# Patient Record
Sex: FEMALE | Race: BLACK OR AFRICAN AMERICAN | NOT HISPANIC OR LATINO
[De-identification: names, ages, dates, MRNs, and addresses within clinical notes are randomized per-mention and may not be internally consistent; named-entity substitution may affect disease eponyms.]

---

## 2017-02-22 ENCOUNTER — APPOINTMENT (OUTPATIENT)
Dept: ULTRASOUND IMAGING | Facility: IMAGING CENTER | Age: 52
End: 2017-02-22

## 2017-02-22 ENCOUNTER — APPOINTMENT (OUTPATIENT)
Dept: MAMMOGRAPHY | Facility: IMAGING CENTER | Age: 52
End: 2017-02-22

## 2017-02-22 ENCOUNTER — OUTPATIENT (OUTPATIENT)
Dept: OUTPATIENT SERVICES | Facility: HOSPITAL | Age: 52
LOS: 1 days | End: 2017-02-22
Payer: COMMERCIAL

## 2017-02-22 DIAGNOSIS — Z98.89 OTHER SPECIFIED POSTPROCEDURAL STATES: Chronic | ICD-10-CM

## 2017-02-22 DIAGNOSIS — Z00.8 ENCOUNTER FOR OTHER GENERAL EXAMINATION: ICD-10-CM

## 2017-02-22 PROCEDURE — 76641 ULTRASOUND BREAST COMPLETE: CPT

## 2017-02-22 PROCEDURE — 77067 SCR MAMMO BI INCL CAD: CPT

## 2017-02-22 PROCEDURE — 77063 BREAST TOMOSYNTHESIS BI: CPT

## 2017-06-01 ENCOUNTER — APPOINTMENT (OUTPATIENT)
Dept: NEUROLOGY | Facility: CLINIC | Age: 52
End: 2017-06-01

## 2017-06-01 VITALS
SYSTOLIC BLOOD PRESSURE: 133 MMHG | DIASTOLIC BLOOD PRESSURE: 75 MMHG | HEART RATE: 85 BPM | BODY MASS INDEX: 26.84 KG/M2 | HEIGHT: 66 IN | WEIGHT: 167 LBS

## 2017-06-17 ENCOUNTER — APPOINTMENT (OUTPATIENT)
Dept: MRI IMAGING | Facility: CLINIC | Age: 52
End: 2017-06-17

## 2017-06-20 ENCOUNTER — APPOINTMENT (OUTPATIENT)
Dept: NEUROLOGY | Facility: CLINIC | Age: 52
End: 2017-06-20

## 2017-06-27 ENCOUNTER — APPOINTMENT (OUTPATIENT)
Dept: NEUROLOGY | Facility: CLINIC | Age: 52
End: 2017-06-27

## 2017-06-27 VITALS
WEIGHT: 170 LBS | HEART RATE: 72 BPM | SYSTOLIC BLOOD PRESSURE: 156 MMHG | HEIGHT: 66 IN | BODY MASS INDEX: 27.32 KG/M2 | DIASTOLIC BLOOD PRESSURE: 84 MMHG

## 2017-06-27 DIAGNOSIS — G56.11 OTHER LESIONS OF MEDIAN NERVE, RIGHT UPPER LIMB: ICD-10-CM

## 2017-06-29 ENCOUNTER — APPOINTMENT (OUTPATIENT)
Dept: NEUROLOGY | Facility: CLINIC | Age: 52
End: 2017-06-29

## 2017-07-03 ENCOUNTER — APPOINTMENT (OUTPATIENT)
Dept: MRI IMAGING | Facility: CLINIC | Age: 52
End: 2017-07-03

## 2017-07-19 ENCOUNTER — FORM ENCOUNTER (OUTPATIENT)
Age: 52
End: 2017-07-19

## 2017-07-20 ENCOUNTER — OUTPATIENT (OUTPATIENT)
Dept: OUTPATIENT SERVICES | Facility: HOSPITAL | Age: 52
LOS: 1 days | End: 2017-07-20
Payer: COMMERCIAL

## 2017-07-20 ENCOUNTER — APPOINTMENT (OUTPATIENT)
Dept: MRI IMAGING | Facility: IMAGING CENTER | Age: 52
End: 2017-07-20

## 2017-07-20 DIAGNOSIS — G44.89 OTHER HEADACHE SYNDROME: ICD-10-CM

## 2017-07-20 DIAGNOSIS — Z98.89 OTHER SPECIFIED POSTPROCEDURAL STATES: Chronic | ICD-10-CM

## 2017-07-20 PROCEDURE — 70551 MRI BRAIN STEM W/O DYE: CPT

## 2017-08-02 ENCOUNTER — APPOINTMENT (OUTPATIENT)
Dept: NEUROLOGY | Facility: CLINIC | Age: 52
End: 2017-08-02

## 2017-10-05 ENCOUNTER — APPOINTMENT (OUTPATIENT)
Dept: OBGYN | Facility: CLINIC | Age: 52
End: 2017-10-05
Payer: COMMERCIAL

## 2017-10-05 ENCOUNTER — ASOB RESULT (OUTPATIENT)
Age: 52
End: 2017-10-05

## 2017-10-05 PROCEDURE — 76857 US EXAM PELVIC LIMITED: CPT

## 2017-10-05 PROCEDURE — 76830 TRANSVAGINAL US NON-OB: CPT

## 2017-10-11 ENCOUNTER — OUTPATIENT (OUTPATIENT)
Dept: OUTPATIENT SERVICES | Facility: HOSPITAL | Age: 52
LOS: 1 days | End: 2017-10-11
Payer: COMMERCIAL

## 2017-10-11 ENCOUNTER — APPOINTMENT (OUTPATIENT)
Dept: ULTRASOUND IMAGING | Facility: IMAGING CENTER | Age: 52
End: 2017-10-11
Payer: COMMERCIAL

## 2017-10-11 DIAGNOSIS — Z98.89 OTHER SPECIFIED POSTPROCEDURAL STATES: Chronic | ICD-10-CM

## 2017-10-11 DIAGNOSIS — Z00.8 ENCOUNTER FOR OTHER GENERAL EXAMINATION: ICD-10-CM

## 2017-10-11 PROCEDURE — 76642 ULTRASOUND BREAST LIMITED: CPT

## 2017-10-12 ENCOUNTER — APPOINTMENT (OUTPATIENT)
Dept: OBGYN | Facility: CLINIC | Age: 52
End: 2017-10-12
Payer: COMMERCIAL

## 2017-10-12 VITALS
DIASTOLIC BLOOD PRESSURE: 83 MMHG | WEIGHT: 163 LBS | HEART RATE: 79 BPM | BODY MASS INDEX: 26.2 KG/M2 | SYSTOLIC BLOOD PRESSURE: 172 MMHG | HEIGHT: 66 IN

## 2017-10-12 PROCEDURE — 99213 OFFICE O/P EST LOW 20 MIN: CPT

## 2017-10-13 PROCEDURE — 76642 ULTRASOUND BREAST LIMITED: CPT | Mod: 26,50

## 2017-10-17 ENCOUNTER — APPOINTMENT (OUTPATIENT)
Dept: NEUROLOGY | Facility: CLINIC | Age: 52
End: 2017-10-17

## 2017-10-19 ENCOUNTER — OUTPATIENT (OUTPATIENT)
Dept: OUTPATIENT SERVICES | Facility: HOSPITAL | Age: 52
LOS: 1 days | End: 2017-10-19
Payer: COMMERCIAL

## 2017-10-19 ENCOUNTER — RESULT REVIEW (OUTPATIENT)
Age: 52
End: 2017-10-19

## 2017-10-19 ENCOUNTER — APPOINTMENT (OUTPATIENT)
Dept: ULTRASOUND IMAGING | Facility: IMAGING CENTER | Age: 52
End: 2017-10-19
Payer: COMMERCIAL

## 2017-10-19 DIAGNOSIS — Z98.89 OTHER SPECIFIED POSTPROCEDURAL STATES: Chronic | ICD-10-CM

## 2017-10-19 DIAGNOSIS — R92.2 INCONCLUSIVE MAMMOGRAM: ICD-10-CM

## 2017-10-19 PROCEDURE — G0206: CPT | Mod: 26,LT

## 2017-10-19 PROCEDURE — 19083 BX BREAST 1ST LESION US IMAG: CPT | Mod: LT

## 2017-10-19 PROCEDURE — 77065 DX MAMMO INCL CAD UNI: CPT

## 2017-10-19 PROCEDURE — 88305 TISSUE EXAM BY PATHOLOGIST: CPT | Mod: 26

## 2017-10-19 PROCEDURE — 88305 TISSUE EXAM BY PATHOLOGIST: CPT

## 2017-10-19 PROCEDURE — 19083 BX BREAST 1ST LESION US IMAG: CPT

## 2017-10-19 PROCEDURE — A4648: CPT

## 2017-10-20 LAB — SURGICAL PATHOLOGY STUDY: SIGNIFICANT CHANGE UP

## 2017-10-22 LAB — BACTERIA UR CULT: NORMAL

## 2017-11-02 ENCOUNTER — APPOINTMENT (OUTPATIENT)
Dept: CT IMAGING | Facility: IMAGING CENTER | Age: 52
End: 2017-11-02
Payer: COMMERCIAL

## 2017-11-02 ENCOUNTER — OUTPATIENT (OUTPATIENT)
Dept: OUTPATIENT SERVICES | Facility: HOSPITAL | Age: 52
LOS: 1 days | End: 2017-11-02
Payer: COMMERCIAL

## 2017-11-02 DIAGNOSIS — Z00.8 ENCOUNTER FOR OTHER GENERAL EXAMINATION: ICD-10-CM

## 2017-11-02 DIAGNOSIS — Z98.89 OTHER SPECIFIED POSTPROCEDURAL STATES: Chronic | ICD-10-CM

## 2017-11-02 PROCEDURE — 74177 CT ABD & PELVIS W/CONTRAST: CPT | Mod: 26

## 2017-11-02 PROCEDURE — 74177 CT ABD & PELVIS W/CONTRAST: CPT

## 2017-11-02 PROCEDURE — 82565 ASSAY OF CREATININE: CPT

## 2018-01-30 ENCOUNTER — TRANSCRIPTION ENCOUNTER (OUTPATIENT)
Age: 53
End: 2018-01-30

## 2018-08-06 ENCOUNTER — FORM ENCOUNTER (OUTPATIENT)
Age: 53
End: 2018-08-06

## 2018-08-07 ENCOUNTER — APPOINTMENT (OUTPATIENT)
Dept: MAMMOGRAPHY | Facility: IMAGING CENTER | Age: 53
End: 2018-08-07
Payer: COMMERCIAL

## 2018-08-07 ENCOUNTER — APPOINTMENT (OUTPATIENT)
Dept: ULTRASOUND IMAGING | Facility: IMAGING CENTER | Age: 53
End: 2018-08-07
Payer: COMMERCIAL

## 2018-08-07 ENCOUNTER — OUTPATIENT (OUTPATIENT)
Dept: OUTPATIENT SERVICES | Facility: HOSPITAL | Age: 53
LOS: 1 days | End: 2018-08-07
Payer: COMMERCIAL

## 2018-08-07 DIAGNOSIS — Z98.89 OTHER SPECIFIED POSTPROCEDURAL STATES: Chronic | ICD-10-CM

## 2018-08-07 DIAGNOSIS — Z00.8 ENCOUNTER FOR OTHER GENERAL EXAMINATION: ICD-10-CM

## 2018-08-07 PROCEDURE — 77063 BREAST TOMOSYNTHESIS BI: CPT | Mod: 26

## 2018-08-07 PROCEDURE — 76641 ULTRASOUND BREAST COMPLETE: CPT | Mod: 26,50

## 2018-08-07 PROCEDURE — 77063 BREAST TOMOSYNTHESIS BI: CPT

## 2018-08-07 PROCEDURE — 77067 SCR MAMMO BI INCL CAD: CPT | Mod: 26

## 2018-08-07 PROCEDURE — 76641 ULTRASOUND BREAST COMPLETE: CPT

## 2018-08-07 PROCEDURE — 77067 SCR MAMMO BI INCL CAD: CPT

## 2018-08-15 ENCOUNTER — APPOINTMENT (OUTPATIENT)
Dept: ULTRASOUND IMAGING | Facility: IMAGING CENTER | Age: 53
End: 2018-08-15
Payer: COMMERCIAL

## 2018-08-15 ENCOUNTER — OUTPATIENT (OUTPATIENT)
Dept: OUTPATIENT SERVICES | Facility: HOSPITAL | Age: 53
LOS: 1 days | End: 2018-08-15
Payer: COMMERCIAL

## 2018-08-15 ENCOUNTER — RESULT REVIEW (OUTPATIENT)
Age: 53
End: 2018-08-15

## 2018-08-15 DIAGNOSIS — R92.2 INCONCLUSIVE MAMMOGRAM: ICD-10-CM

## 2018-08-15 DIAGNOSIS — Z98.89 OTHER SPECIFIED POSTPROCEDURAL STATES: Chronic | ICD-10-CM

## 2018-08-15 PROCEDURE — A4648: CPT

## 2018-08-15 PROCEDURE — 77065 DX MAMMO INCL CAD UNI: CPT | Mod: 26,RT

## 2018-08-15 PROCEDURE — 19083 BX BREAST 1ST LESION US IMAG: CPT

## 2018-08-15 PROCEDURE — 88305 TISSUE EXAM BY PATHOLOGIST: CPT

## 2018-08-15 PROCEDURE — 77065 DX MAMMO INCL CAD UNI: CPT

## 2018-08-15 PROCEDURE — 19083 BX BREAST 1ST LESION US IMAG: CPT | Mod: RT

## 2018-08-15 PROCEDURE — 88305 TISSUE EXAM BY PATHOLOGIST: CPT | Mod: 26

## 2019-01-22 ENCOUNTER — LABORATORY RESULT (OUTPATIENT)
Age: 54
End: 2019-01-22

## 2019-01-22 ENCOUNTER — APPOINTMENT (OUTPATIENT)
Dept: PULMONOLOGY | Facility: CLINIC | Age: 54
End: 2019-01-22
Payer: COMMERCIAL

## 2019-01-22 VITALS
WEIGHT: 165 LBS | HEIGHT: 66 IN | OXYGEN SATURATION: 100 % | HEART RATE: 86 BPM | RESPIRATION RATE: 16 BRPM | BODY MASS INDEX: 26.52 KG/M2 | TEMPERATURE: 98.2 F | SYSTOLIC BLOOD PRESSURE: 167 MMHG | DIASTOLIC BLOOD PRESSURE: 91 MMHG

## 2019-01-22 PROCEDURE — 94727 GAS DIL/WSHOT DETER LNG VOL: CPT

## 2019-01-22 PROCEDURE — 88738 HGB QUANT TRANSCUTANEOUS: CPT

## 2019-01-22 PROCEDURE — 71046 X-RAY EXAM CHEST 2 VIEWS: CPT

## 2019-01-22 PROCEDURE — 99205 OFFICE O/P NEW HI 60 MIN: CPT | Mod: 25

## 2019-01-22 PROCEDURE — 94060 EVALUATION OF WHEEZING: CPT

## 2019-01-22 PROCEDURE — 36415 COLL VENOUS BLD VENIPUNCTURE: CPT

## 2019-01-22 PROCEDURE — 94750: CPT

## 2019-01-22 RX ORDER — FAMOTIDINE 40 MG/1
40 TABLET, FILM COATED ORAL
Qty: 30 | Refills: 0 | Status: DISCONTINUED | COMMUNITY
Start: 2017-09-28 | End: 2019-01-22

## 2019-01-22 RX ORDER — MOMETASONE FUROATE 1 MG/G
0.1 CREAM TOPICAL
Qty: 45 | Refills: 0 | Status: DISCONTINUED | COMMUNITY
Start: 2017-09-28 | End: 2019-01-22

## 2019-01-22 RX ORDER — AMLODIPINE BESYLATE AND BENAZEPRIL HYDROCHLORIDE 10; 40 MG/1; MG/1
10-40 CAPSULE ORAL
Qty: 30 | Refills: 0 | Status: DISCONTINUED | COMMUNITY
Start: 2017-05-09 | End: 2019-01-22

## 2019-01-22 RX ORDER — HYDROCHLOROTHIAZIDE 12.5 MG/1
12.5 CAPSULE ORAL DAILY
Refills: 0 | Status: DISCONTINUED | COMMUNITY
End: 2019-01-22

## 2019-01-23 LAB
BASOPHILS # BLD AUTO: 0.01 K/UL
BASOPHILS NFR BLD AUTO: 0.2 %
EOSINOPHIL # BLD AUTO: 0.77 K/UL
EOSINOPHIL NFR BLD AUTO: 15.7 %
HCT VFR BLD CALC: 38.4 %
HGB BLD-MCNC: 12.4 G/DL
IMM GRANULOCYTES NFR BLD AUTO: 0.2 %
LYMPHOCYTES # BLD AUTO: 1.76 K/UL
LYMPHOCYTES NFR BLD AUTO: 35.9 %
MAN DIFF?: NORMAL
MCHC RBC-ENTMCNC: 30.7 PG
MCHC RBC-ENTMCNC: 32.3 GM/DL
MCV RBC AUTO: 95 FL
MONOCYTES # BLD AUTO: 0.4 K/UL
MONOCYTES NFR BLD AUTO: 8.2 %
NEUTROPHILS # BLD AUTO: 1.95 K/UL
NEUTROPHILS NFR BLD AUTO: 39.8 %
PLATELET # BLD AUTO: 264 K/UL
RBC # BLD: 4.04 M/UL
RBC # FLD: 13.3 %
WBC # FLD AUTO: 4.9 K/UL

## 2019-01-25 LAB
A ALTERNATA IGE QN: <0.1 KUA/L
A FUMIGATUS IGE QN: 0.28 KUA/L
C ALBICANS IGE QN: 0.43 KUA/L
C HERBARUM IGE QN: 0.28 KUA/L
CAT DANDER IGE QN: 0.17 KUA/L
CLAM IGE QN: 39 KUA/L
CODFISH IGE QN: <0.1 KUA/L
COMMON RAGWEED IGE QN: 0.32 KUA/L
CORN IGE QN: 0.73 KUA/L
COW MILK IGE QN: 0.7 KUA/L
D FARINAE IGE QN: 98.6 KUA/L
D PTERONYSS IGE QN: 86 KUA/L
DEPRECATED A ALTERNATA IGE RAST QL: 0
DEPRECATED A FUMIGATUS IGE RAST QL: NORMAL
DEPRECATED C ALBICANS IGE RAST QL: ABNORMAL
DEPRECATED C HERBARUM IGE RAST QL: NORMAL
DEPRECATED CAT DANDER IGE RAST QL: NORMAL
DEPRECATED CLAM IGE RAST QL: ABNORMAL
DEPRECATED CODFISH IGE RAST QL: 0
DEPRECATED COMMON RAGWEED IGE RAST QL: NORMAL
DEPRECATED CORN IGE RAST QL: ABNORMAL
DEPRECATED COW MILK IGE RAST QL: ABNORMAL
DEPRECATED D FARINAE IGE RAST QL: ABNORMAL
DEPRECATED D PTERONYSS IGE RAST QL: ABNORMAL
DEPRECATED DOG DANDER IGE RAST QL: ABNORMAL
DEPRECATED EGG WHITE IGE RAST QL: ABNORMAL
DEPRECATED M RACEMOSUS IGE RAST QL: NORMAL
DEPRECATED PEANUT IGE RAST QL: ABNORMAL
DEPRECATED ROACH IGE RAST QL: ABNORMAL
DEPRECATED SCALLOP IGE RAST QL: 2.81 KUA/L
DEPRECATED SESAME SEED IGE RAST QL: ABNORMAL
DEPRECATED SHRIMP IGE RAST QL: ABNORMAL
DEPRECATED SOYBEAN IGE RAST QL: NORMAL
DEPRECATED TIMOTHY IGE RAST QL: NORMAL
DEPRECATED WALNUT IGE RAST QL: ABNORMAL
DEPRECATED WHEAT IGE RAST QL: ABNORMAL
DEPRECATED WHITE OAK IGE RAST QL: NORMAL
DOG DANDER IGE QN: 6.14 KUA/L
EGG WHITE IGE QN: 3.84 KUA/L
M RACEMOSUS IGE QN: 0.14 KUA/L
PEANUT IGE QN: 3.07 KUA/L
ROACH IGE QN: 13.6 KUA/L
SCALLOP IGE QN: 15.1 KUA/L
SCALLOP IGE QN: ABNORMAL
SESAME SEED IGE QN: 1.03 KUA/L
SOYBEAN IGE QN: 0.16 KUA/L
TIMOTHY IGE QN: 0.17 KUA/L
WALNUT IGE QN: 0.39 KUA/L
WHEAT IGE QN: 0.89 KUA/L
WHITE OAK IGE QN: 0.23 KUA/L

## 2019-01-30 LAB
A FLAVUS AB FLD QL: NEGATIVE
A FUMIGATUS AB FLD QL: NEGATIVE
A NIGER AB FLD QL: NEGATIVE
ASPERGILLUS FLAVUS PRECIPITINS: NEGATIVE
ASPERGILLUS FUMIGATES PRECIPTINS: NEGATIVE
ASPERGILLUS NIGER PRECIPITINS: NEGATIVE

## 2019-02-01 LAB
ASPERGILLUS FLAVUS PRECIPITINS: NEGATIVE
ASPERGILLUS FUMIGATES PRECIPTINS: NEGATIVE
ASPERGILLUS NIGER PRECIPITINS: NEGATIVE

## 2019-02-01 NOTE — DISCUSSION/SUMMARY
[FreeTextEntry1] : Shortness of breath dyspnea on exertion\par Normal pulmonary physiology and unremarkable x-ray\par Chronic sinus disease with associated sinus drip\par Taste on elevated eosinophils cannot entirely exclude inflammatory airway disease asthma\par \par Recommendation Nasacort 2 sprays each nostril daily on a consistent basis\par Add nasal Atrovent 2 sprays twice daily in each nostril\par Singulair 10 mg daily as an anti-inflammatory\par Zithromax No. 1 Per instructions\par Course of steroids with a Medrol Dosepak\par Office follow-up several weeks\par Check CBC eosinophil count post treatment\par Consider additional medication for pulmonary symptoms and even consider methacholine challenge if still short of breath\par Possible ENT pending\par f/u and discuss Allergy with Dr Boxer

## 2019-02-01 NOTE — REVIEW OF SYSTEMS
[As Noted in HPI] : as noted in HPI [Nasal Congestion] : nasal congestion [Postnasal Drip] : postnasal drip [Sinus Problems] : sinus problems [Hypertension] : ~T hypertension [Reflux] : reflux [Negative] : Pulmonary Hypertension [Dry Eyes] : no dryness of the eyes [Eye Irritation] : no ~T irritation of the eyes [Epistaxis] : no nosebleeds [Dry Mouth] : no dry mouth [Mouth Ulcers] : no mouth ulcers [Nausea] : no nausea [Vomiting] : no vomiting [Constipation] : no constipation [Diarrhea] : no diarrhea [Abdominal Pain] : no abdominal pain [Bleeding] : no bleeding [Diabetes] : no diabetes mellitus [Thyroid Problem] : no thyroid problem

## 2019-02-01 NOTE — PROCEDURE
[FreeTextEntry1] : PFT January 22, 2019\par Spirometry flow rates normal\par No brisk response to inhaled bronchodilator at FEV1\par FEV1 FEC ratio 78\par Lung volumes normal with total lung capacity 94% predicted.\par Diffusion normal 88% predicted.\par Hemoglobin 11.5\par \par Chest x-ray PA lateral projection January 22, 2019\par Lung fields are clear\par Borderline cardiomegaly\par No dominant parenchymal infiltrates dominant pulmonary nodules pleural effusions pneumothorax\par Impression clear lungs\par \par Blood draw\par Addendum data review January 23, 2018\par CBC\par White blood count 4.90 hemoglobin 12.4 hematocrit 38.4 platelet count 264,000\par Increased eosinophil count 15.7%\par \par 1/25/19 \par serum IgE elevated >3000\par RAST\par pos Dustmite\par pos Dog\par added Aspergilious  titers Addendum Negative\par Pos egg whites shell fish peanuts\par Aspergilius titers precipitins are both negative

## 2019-02-01 NOTE — REASON FOR VISIT
[Initial Evaluation] : an initial evaluation [Wheezing] : wheezing [FreeTextEntry2] : Difficult breathing

## 2019-02-01 NOTE — HISTORY OF PRESENT ILLNESS
[FreeTextEntry1] : 53-year-old female referral pulmonary evaluation\par Chief complaint difficulty breathing some component of shortness of breath chest congestion occasional wheeze\par No active sputum but has a significant cough\par No purulent sputum hemoptysis\par No fevers chills or night sweats\par No chest pain exertional chest pain pleuritic chest pain\par Active nasal sinus symptoms with congestion fullness in the sinuses and a component of drip\par Has a history of some allergies she states to foods cats dogs\par No history reported of asthma pneumonia tuberculosis pulmonary embolic disease obstructive sleep apnea\par Does recall being told of a bronchitis in the past\par Past medical history hypertension\par Allergies negative medications\par Social history former tobacco smoker smoked less than 3 curettes per day discontinued all smoking approximately 10-15 years ago\par Alcohol social\par \par Past surgical history multiple breast biopsies myomectomy\par Hysterectomy\par

## 2019-02-15 ENCOUNTER — APPOINTMENT (OUTPATIENT)
Dept: PULMONOLOGY | Facility: CLINIC | Age: 54
End: 2019-02-15
Payer: COMMERCIAL

## 2019-02-15 ENCOUNTER — LABORATORY RESULT (OUTPATIENT)
Age: 54
End: 2019-02-15

## 2019-02-15 VITALS
TEMPERATURE: 98.3 F | SYSTOLIC BLOOD PRESSURE: 158 MMHG | RESPIRATION RATE: 16 BRPM | OXYGEN SATURATION: 98 % | DIASTOLIC BLOOD PRESSURE: 101 MMHG | HEART RATE: 96 BPM

## 2019-02-15 PROCEDURE — 94060 EVALUATION OF WHEEZING: CPT

## 2019-02-15 PROCEDURE — 99214 OFFICE O/P EST MOD 30 MIN: CPT | Mod: 25

## 2019-02-15 PROCEDURE — 36415 COLL VENOUS BLD VENIPUNCTURE: CPT

## 2019-02-15 RX ORDER — AZITHROMYCIN 250 MG/1
250 TABLET, FILM COATED ORAL
Qty: 1 | Refills: 0 | Status: DISCONTINUED | COMMUNITY
Start: 2019-01-22 | End: 2019-02-15

## 2019-02-15 RX ORDER — METHYLPREDNISOLONE 4 MG/1
4 TABLET ORAL
Qty: 1 | Refills: 0 | Status: DISCONTINUED | COMMUNITY
Start: 2019-01-22 | End: 2019-02-15

## 2019-02-16 LAB — ERYTHROCYTE [SEDIMENTATION RATE] IN BLOOD BY WESTERGREN METHOD: 34 MM/HR

## 2019-02-19 LAB
BASOPHILS # BLD AUTO: 0.01 K/UL
BASOPHILS NFR BLD AUTO: 0.4 %
EOSINOPHIL # BLD AUTO: 0.18 K/UL
EOSINOPHIL NFR BLD AUTO: 6.7 %
HCT VFR BLD CALC: 38.1 %
HGB BLD-MCNC: 12.5 G/DL
IMM GRANULOCYTES NFR BLD AUTO: 0 %
LYMPHOCYTES # BLD AUTO: 1.06 K/UL
LYMPHOCYTES NFR BLD AUTO: 39.6 %
MAN DIFF?: NORMAL
MCHC RBC-ENTMCNC: 32.6 PG
MCHC RBC-ENTMCNC: 32.8 GM/DL
MCV RBC AUTO: 99.2 FL
MONOCYTES # BLD AUTO: 0.47 K/UL
MONOCYTES NFR BLD AUTO: 17.5 %
NEUTROPHILS # BLD AUTO: 0.96 K/UL
NEUTROPHILS NFR BLD AUTO: 35.8 %
PLATELET # BLD AUTO: 182 K/UL
RBC # BLD: 3.84 M/UL
RBC # FLD: 14.2 %
WBC # FLD AUTO: 2.68 K/UL

## 2019-02-19 NOTE — DISCUSSION/SUMMARY
[FreeTextEntry1] : Unexplained episodes shortness of breath\par There is no descriptive evidence with these events of a bronchospastic event\par Very elevated IgE level with significant positive food and asthma Rast testing\par Associated eosinophilia\par Consistent with Atopy\par Aerated Flovent 110 MCG 2 puffs twice daily with instructions to rinse and pro-air for rescue therapy\par Recheck CBC and sedimentation rate\par Allergy evaluation recommended with Mitchell Boxer\par Also benefit from a workup to exclude monoclonal gammopathy\par Is no evidence to suggest parasitic type of infections without any significant or high risk travel\par  \par \par

## 2019-02-19 NOTE — PHYSICAL EXAM
[General Appearance - Well Developed] : well developed [Normal Appearance] : normal appearance [Well Groomed] : well groomed [General Appearance - Well Nourished] : well nourished [No Deformities] : no deformities [General Appearance - In No Acute Distress] : no acute distress [Normal Conjunctiva] : the conjunctiva exhibited no abnormalities [Eyelids - No Xanthelasma] : the eyelids demonstrated no xanthelasmas [Neck Appearance] : the appearance of the neck was normal [Neck Cervical Mass (___cm)] : no neck mass was observed [Jugular Venous Distention Increased] : there was no jugular-venous distention [Thyroid Diffuse Enlargement] : the thyroid was not enlarged [Heart Rate And Rhythm] : heart rate and rhythm were normal [Heart Sounds] : normal S1 and S2 [Arterial Pulses Normal] : the arterial pulses were normal [Edema] : no peripheral edema present [Veins - Varicosity Changes] : no varicosital changes were noted in the lower extremities [Respiration, Rhythm And Depth] : normal respiratory rhythm and effort [Exaggerated Use Of Accessory Muscles For Inspiration] : no accessory muscle use [Auscultation Breath Sounds / Voice Sounds] : lungs were clear to auscultation bilaterally [Chest Palpation] : palpation of the chest revealed no abnormalities [Lungs Percussion] : the lungs were normal to percussion [Bowel Sounds] : normal bowel sounds [Abdomen Soft] : soft [Abdomen Tenderness] : non-tender [Abdomen Mass (___ Cm)] : no abdominal mass palpated [Abnormal Walk] : normal gait [Gait - Sufficient For Exercise Testing] : the gait was sufficient for exercise testing [Nail Clubbing] : no clubbing of the fingernails [Cyanosis, Localized] : no localized cyanosis [Petechial Hemorrhages (___cm)] : no petechial hemorrhages [Deep Tendon Reflexes (DTR)] : deep tendon reflexes were 2+ and symmetric [Sensation] : the sensory exam was normal to light touch and pinprick [Motor Exam] : the motor exam was normal [No Focal Deficits] : no focal deficits [Oriented To Time, Place, And Person] : oriented to person, place, and time [Impaired Insight] : insight and judgment were intact [Affect] : the affect was normal [Mood] : the mood was normal [Skin Color & Pigmentation] : normal skin color and pigmentation [] : no rash [No Venous Stasis] : no venous stasis [Skin Lesions] : no skin lesions [No Skin Ulcers] : no skin ulcer [No Xanthoma] : no  xanthoma was observed [FreeTextEntry1] : Faint 1/6 systolic murmur

## 2019-02-19 NOTE — PROCEDURE
[FreeTextEntry1] : PFT January 22, 2019\par Spirometry flow rates normal\par No  response to inhaled bronchodilator at FEV1\par FEV1 FEC ratio 78\par Lung volumes normal with total lung capacity 94% predicted.\par Diffusion normal 88% predicted.\par Hemoglobin 11.5\par \par Chest x-ray PA lateral projection January 22, 2019\par Lung fields are clear\par Borderline cardiomegaly\par No dominant parenchymal infiltrates dominant pulmonary nodules pleural effusions pneumothorax\par Impression clear lungs\par \par Blood draw\par \par Spirometry 2/15/2019\par  Normal Flow Rtaes\par  No decline\par Addendum data review January 23, 2018\par CBC\par White blood count 4.90 hemoglobin 12.4 hematocrit 38.4 platelet count 264,000\par Increased eosinophil count 15.7%\par \par 1/25/19 \par serum IgE elevated >3000\par RAST\par pos Dustmite\par pos Dog\par added Aspergilious  titers Addendum Negative\par Pos egg whites shell fish peanuts\par Aspergilius titers precipitins are both negative\par \par ADDENDUM\par Data review\par Sedimentation rate 34\par CBC 2.68 hemoglobin 12.5 hematocrit 38.1 platelet count 182,000\par \par 15.5% monocytes\par Eosinophils 6.7%

## 2019-03-14 ENCOUNTER — APPOINTMENT (OUTPATIENT)
Dept: ALLERGY | Facility: CLINIC | Age: 54
End: 2019-03-14
Payer: COMMERCIAL

## 2019-03-14 VITALS
SYSTOLIC BLOOD PRESSURE: 160 MMHG | BODY MASS INDEX: 26.52 KG/M2 | DIASTOLIC BLOOD PRESSURE: 100 MMHG | WEIGHT: 165 LBS | RESPIRATION RATE: 16 BRPM | HEIGHT: 66 IN | HEART RATE: 80 BPM

## 2019-03-14 PROCEDURE — 95018 ALL TSTG PERQ&IQ DRUGS/BIOL: CPT

## 2019-03-14 PROCEDURE — 31231 NASAL ENDOSCOPY DX: CPT

## 2019-03-14 PROCEDURE — 95004 PERQ TESTS W/ALRGNC XTRCS: CPT

## 2019-03-14 PROCEDURE — 99204 OFFICE O/P NEW MOD 45 MIN: CPT | Mod: 25

## 2019-03-14 RX ORDER — FLUTICASONE PROPIONATE 110 UG/1
110 AEROSOL, METERED RESPIRATORY (INHALATION)
Qty: 1 | Refills: 3 | Status: DISCONTINUED | COMMUNITY
Start: 2019-02-15 | End: 2019-03-14

## 2019-03-14 NOTE — ASSESSMENT
[FreeTextEntry1] : Perennial allergic rhinitis with associated elevated IgE and mild eosinophilia\par \par Continue Singulair QD\par Continue Nasacort AQ QD\par Sinus CT\par RV environmental intradermal skin testing \par \par Hypertension, joint aches and facial rash:\par \par Patient to take her BP pill on a regular basis\par Possible EGPA

## 2019-03-14 NOTE — SOCIAL HISTORY
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

## 2019-03-14 NOTE — REVIEW OF SYSTEMS
[Nasal Congestion] : nasal congestion [Nl] : Genitourinary [FreeTextEntry9] : body aches - left wrist swelling at times

## 2019-03-14 NOTE — PHYSICAL EXAM
[Alert] : alert [Well Nourished] : well nourished [Healthy Appearance] : healthy appearance [No Acute Distress] : no acute distress [Well Developed] : well developed [Normal Voice/Communication] : normal voice communication [Normal Lips/Tongue] : the lips and tongue were normal [Normal Tonsils] : normal tonsils [No Oral Lesions or Ulcers] : no oral lesions or ulcers [Boggy Nasal Turbinates] : boggy and/or pale nasal turbinates [No Neck Mass] : no neck mass was observed [No LAD] : no lymphadenopathy [No Thyroid Mass] : no thyroid mass [Supple] : the neck was supple [Normal Rate and Effort] : normal respiratory rhythm and effort [No Crackles] : no crackles [No Retractions] : no retractions [Normal Rate] : heart rate was normal  [Normal S1, S2] : normal S1 and S2 [Regular Rhythm] : with a regular rhythm [Normal Cervical Lymph Nodes] : cervical [No clubbing] : no clubbing [No Edema] : no edema [No Cyanosis] : no cyanosis [Normal Mood] : mood was normal [Normal Affect] : affect was normal [Judgment and Insight Age Appropriate] : judgement and insight is age appropriate [Alert, Awake, Oriented as Age-Appropriate] : alert, awake, oriented as age appropriate [Conjunctival Erythema] : no conjunctival erythema [Suborbital Bogginess] : no suborbital bogginess (allergic shiners) [Wheezing] : no wheezing was heard [de-identified] : mild papular eruption on face

## 2019-03-14 NOTE — IMPRESSION
[Allergy Testing Dust Mite] : dust mites [Allergy Testing Cockroach] : cockroach [Allergy Testing Dog] : dog [Allergy Testing Cat] : cat [] : molds [Allergy Testing Trees] : trees [Allergy Testing Weeds] : weeds [Allergy Testing Grasses] : grasses [FreeTextEntry2] : Boggy mucosa - no polyps

## 2019-03-14 NOTE — HISTORY OF PRESENT ILLNESS
[Eczematous rashes] : eczematous rashes [Venom Reactions] : venom reactions [de-identified] : Patient born in [facility] and moved to [place] age 8 and moved to [place] to 18.   Patient with a long history of itchy throat and ears, sneezing perennial basis.   She was treated with Nasacort AQ with improvement of her symptoms.   She also reports a long history of body aches, upper back pain - she has seen doctors with no diagnosis.   Her nose has been more blocked and congested - associated with SOB with sense of gasping for air at times.   She was evaluated by Dr. Morales and treated Medrol and Z pack in January with some improvement.   Her nasal congestion, coughing attacks and SOB improved.   She improved x 4-5 weeks and nasal congestion worsened but chest did not become as problematic.   \par \par Mucus from her nose ranges from yellow - dark green - but now mucus is clear.    Sense of taste and smell normal.   She has body aches which are worse in the AM or if she sits and gets up her legs will hurt.    \par \par Lab work up revealed an eosinophil count of 770 K/uL and repeated after Medrol pack was 180 K/uL.   IgE 3316 KU/L \par \par No history of asthma in the past.   \par \par Egg allergy as a child - vomiting with rash.    About two years ago she was able to eat egg again.    She was advised to avoid again based upon blood testing.   Oral itch in the past with shell fish but she has started to eat again. \par \par Patient also with facial rash which is intermittently itchy - rash comes and goes \par \par No recent travel to [facility].

## 2019-03-19 ENCOUNTER — APPOINTMENT (OUTPATIENT)
Dept: ALLERGY | Facility: CLINIC | Age: 54
End: 2019-03-19
Payer: COMMERCIAL

## 2019-03-19 VITALS
WEIGHT: 165 LBS | DIASTOLIC BLOOD PRESSURE: 90 MMHG | BODY MASS INDEX: 26.52 KG/M2 | RESPIRATION RATE: 14 BRPM | HEIGHT: 66 IN | SYSTOLIC BLOOD PRESSURE: 160 MMHG | HEART RATE: 72 BPM

## 2019-03-19 PROCEDURE — 95018 ALL TSTG PERQ&IQ DRUGS/BIOL: CPT

## 2019-03-19 PROCEDURE — 99213 OFFICE O/P EST LOW 20 MIN: CPT | Mod: 25

## 2019-03-19 PROCEDURE — 95024 IQ TESTS W/ALLERGENIC XTRCS: CPT

## 2019-03-19 RX ORDER — IPRATROPIUM BROMIDE 21 UG/1
0.03 SPRAY NASAL
Qty: 1 | Refills: 3 | Status: DISCONTINUED | COMMUNITY
Start: 2019-01-22 | End: 2019-03-19

## 2019-03-19 NOTE — ASSESSMENT
[FreeTextEntry1] : Perennial allergic rhinitis - possible EGPA\par \par Continue Singulair - Nasacort AQ\par Add Xyzal 5 mg QHS \par Sinus CT \par \par Hypertension:\par \par Encourage regular use of BP medications.

## 2019-03-19 NOTE — HISTORY OF PRESENT ILLNESS
[Asthma] : asthma [Eczematous rashes] : eczematous rashes [Venom Reactions] : venom reactions [Food Allergies] : food allergies [de-identified] : Patient is having her sinus CT on Thursday.    Patient has missed some of her BP medication dosages.    She is taking Singulair and Flonase every AM mild improvement.

## 2019-03-19 NOTE — PHYSICAL EXAM
[Well Nourished] : well nourished [Alert] : alert [Healthy Appearance] : healthy appearance [No Acute Distress] : no acute distress [Well Developed] : well developed [Normal Voice/Communication] : normal voice communication [Normal Lips/Tongue] : the lips and tongue were normal [Normal Tonsils] : normal tonsils [No Oral Lesions or Ulcers] : no oral lesions or ulcers [Boggy Nasal Turbinates] : boggy and/or pale nasal turbinates [No Neck Mass] : no neck mass was observed [No LAD] : no lymphadenopathy [No Thyroid Mass] : no thyroid mass [Normal Rate and Effort] : normal respiratory rhythm and effort [Supple] : the neck was supple [No Crackles] : no crackles [No Retractions] : no retractions [Normal Rate] : heart rate was normal  [Normal S1, S2] : normal S1 and S2 [Normal Cervical Lymph Nodes] : cervical [Regular Rhythm] : with a regular rhythm [No clubbing] : no clubbing [No Edema] : no edema [No Cyanosis] : no cyanosis [Normal Mood] : mood was normal [Normal Affect] : affect was normal [Judgment and Insight Age Appropriate] : judgement and insight is age appropriate [Alert, Awake, Oriented as Age-Appropriate] : alert, awake, oriented as age appropriate [Conjunctival Erythema] : no conjunctival erythema [Suborbital Bogginess] : no suborbital bogginess (allergic shiners) [Wheezing] : no wheezing was heard [de-identified] : mild papular eruption on face

## 2019-03-19 NOTE — SOCIAL HISTORY
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

## 2019-03-19 NOTE — REVIEW OF SYSTEMS
[Nasal Congestion] : nasal congestion [Nl] : Endocrine [FreeTextEntry9] : body aches - left wrist swelling at times

## 2019-03-25 ENCOUNTER — TRANSCRIPTION ENCOUNTER (OUTPATIENT)
Age: 54
End: 2019-03-25

## 2019-03-27 ENCOUNTER — OUTPATIENT (OUTPATIENT)
Dept: OUTPATIENT SERVICES | Facility: HOSPITAL | Age: 54
LOS: 1 days | End: 2019-03-27
Payer: COMMERCIAL

## 2019-03-27 ENCOUNTER — APPOINTMENT (OUTPATIENT)
Dept: CT IMAGING | Facility: CLINIC | Age: 54
End: 2019-03-27
Payer: COMMERCIAL

## 2019-03-27 DIAGNOSIS — Z98.89 OTHER SPECIFIED POSTPROCEDURAL STATES: Chronic | ICD-10-CM

## 2019-03-27 DIAGNOSIS — J32.9 CHRONIC SINUSITIS, UNSPECIFIED: ICD-10-CM

## 2019-03-27 PROCEDURE — 70486 CT MAXILLOFACIAL W/O DYE: CPT

## 2019-03-27 PROCEDURE — 70486 CT MAXILLOFACIAL W/O DYE: CPT | Mod: 26

## 2019-04-04 ENCOUNTER — TRANSCRIPTION ENCOUNTER (OUTPATIENT)
Age: 54
End: 2019-04-04

## 2019-04-11 ENCOUNTER — APPOINTMENT (OUTPATIENT)
Dept: ALLERGY | Facility: CLINIC | Age: 54
End: 2019-04-11
Payer: COMMERCIAL

## 2019-04-11 VITALS
HEART RATE: 90 BPM | SYSTOLIC BLOOD PRESSURE: 150 MMHG | WEIGHT: 165 LBS | HEIGHT: 66 IN | RESPIRATION RATE: 14 BRPM | BODY MASS INDEX: 26.52 KG/M2 | DIASTOLIC BLOOD PRESSURE: 90 MMHG

## 2019-04-11 PROCEDURE — 99214 OFFICE O/P EST MOD 30 MIN: CPT

## 2019-05-14 NOTE — PHYSICAL EXAM
[Well Nourished] : well nourished [Alert] : alert [No Acute Distress] : no acute distress [Healthy Appearance] : healthy appearance [Well Developed] : well developed [Normal Voice/Communication] : normal voice communication [Normal Lips/Tongue] : the lips and tongue were normal [No Oral Lesions or Ulcers] : no oral lesions or ulcers [Normal Tonsils] : normal tonsils [No Neck Mass] : no neck mass was observed [Boggy Nasal Turbinates] : boggy and/or pale nasal turbinates [No Thyroid Mass] : no thyroid mass [No LAD] : no lymphadenopathy [Supple] : the neck was supple [Normal Rate and Effort] : normal respiratory rhythm and effort [No Retractions] : no retractions [No Crackles] : no crackles [Normal Rate] : heart rate was normal  [Regular Rhythm] : with a regular rhythm [Normal S1, S2] : normal S1 and S2 [Normal Cervical Lymph Nodes] : cervical [No clubbing] : no clubbing [No Edema] : no edema [No Cyanosis] : no cyanosis [Normal Mood] : mood was normal [Normal Affect] : affect was normal [Judgment and Insight Age Appropriate] : judgement and insight is age appropriate [Alert, Awake, Oriented as Age-Appropriate] : alert, awake, oriented as age appropriate [Conjunctival Erythema] : no conjunctival erythema [Suborbital Bogginess] : no suborbital bogginess (allergic shiners) [Wheezing] : no wheezing was heard [de-identified] : mild papular eruption on face

## 2019-05-14 NOTE — ASSESSMENT
[FreeTextEntry1] : Perennial allergic rhinitis with seasonal exacerbation:\par \par Continue Singulair QD\par Continue Nasacort AQ QD\par Continue Xyzal \par 1. I have reviewed the treatment option of allergy immunotherapy.\par 2. I have reviewed allergen avoidance measures in order to reduce exposure to pertinent allergens.\par 3. IT QA booklet given to patient detailing allergy immunotherapy\par 4. I have recommended medical therapy for patient's allergy symptoms as outlined below. \par \par Joint pain/facial rash:\par \par Patient to see rheumatology for evaluation prior to starting allergy immunotherapy.   She will contact me after the evaluation.\par \par Hypertension:\par \par Mrs. Garcia to see PCP for medical management.

## 2019-05-14 NOTE — CONSULT LETTER
[Dear  ___] : Dear  [unfilled], [Thank you for referring [unfilled] for consultation for _____] : Thank you for referring [unfilled] for consultation for [unfilled] [Sincerely,] : Sincerely, [Please see my note below.] : Please see my note below. [FreeTextEntry3] : Mitchell B. Boxer, M.D., FAAAAI\par [facility]\par \par Department of Allergy-Immunology\par [facility] [place] [facility] \par [address] [place] [address]\par [place] [address]\par Tel:   [phone]\par Fax:  [phone]\par Email: [email or web address]

## 2019-05-14 NOTE — SOCIAL HISTORY
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

## 2019-05-14 NOTE — HISTORY OF PRESENT ILLNESS
[Asthma] : asthma [Eczematous rashes] : eczematous rashes [Venom Reactions] : venom reactions [Food Allergies] : food allergies [de-identified] : Patient doing much better with Singulair, Nasacort AQ and Xyzal - mucosal thickening on CT sinuses.   Patient continues to have sporadic joint pain and mild facial rash.

## 2019-07-03 ENCOUNTER — LABORATORY RESULT (OUTPATIENT)
Age: 54
End: 2019-07-03

## 2019-07-03 ENCOUNTER — APPOINTMENT (OUTPATIENT)
Dept: RHEUMATOLOGY | Facility: CLINIC | Age: 54
End: 2019-07-03
Payer: COMMERCIAL

## 2019-07-03 VITALS
HEIGHT: 66 IN | BODY MASS INDEX: 25.71 KG/M2 | HEART RATE: 69 BPM | WEIGHT: 160 LBS | SYSTOLIC BLOOD PRESSURE: 149 MMHG | DIASTOLIC BLOOD PRESSURE: 84 MMHG | OXYGEN SATURATION: 98 %

## 2019-07-03 PROCEDURE — 99204 OFFICE O/P NEW MOD 45 MIN: CPT

## 2019-07-05 LAB
ALBUMIN SERPL ELPH-MCNC: 4.5 G/DL
ALP BLD-CCNC: 69 U/L
ALT SERPL-CCNC: 18 U/L
ANION GAP SERPL CALC-SCNC: 12 MMOL/L
APPEARANCE: CLEAR
APTT BLD: 39.1 SEC
AST SERPL-CCNC: 19 U/L
BACTERIA: NEGATIVE
BASOPHILS # BLD AUTO: 0.02 K/UL
BASOPHILS NFR BLD AUTO: 0.5 %
BILIRUB SERPL-MCNC: 0.3 MG/DL
BILIRUBIN URINE: NEGATIVE
BLOOD URINE: NEGATIVE
BUN SERPL-MCNC: 16 MG/DL
CALCIUM SERPL-MCNC: 9.5 MG/DL
CENTROMERE IGG SER-ACNC: 0.2 CD:130001892
CHLORIDE SERPL-SCNC: 104 MMOL/L
CO2 SERPL-SCNC: 25 MMOL/L
COLOR: NORMAL
CREAT SERPL-MCNC: 1.08 MG/DL
CREAT SPEC-SCNC: 120 MG/DL
CREAT/PROT UR: 0.1 RATIO
CRP SERPL-MCNC: <0.1 MG/DL
ENA RNP AB SER IA-ACNC: 2.5 AL
ENA SCL70 IGG SER IA-ACNC: <0.2 AL
ENA SM AB SER IA-ACNC: <0.2 AL
ENA SS-A AB SER IA-ACNC: <0.2 AL
ENA SS-B AB SER IA-ACNC: <0.2 AL
EOSINOPHIL # BLD AUTO: 0.5 K/UL
EOSINOPHIL NFR BLD AUTO: 12 %
ERYTHROCYTE [SEDIMENTATION RATE] IN BLOOD BY WESTERGREN METHOD: 24 MM/HR
FOLATE SERPL-MCNC: 12.3 NG/ML
GLUCOSE QUALITATIVE U: NEGATIVE
GLUCOSE SERPL-MCNC: 90 MG/DL
HCT VFR BLD CALC: 36.6 %
HGB BLD-MCNC: 11.7 G/DL
HYALINE CASTS: 0 /LPF
IMM GRANULOCYTES NFR BLD AUTO: 0.2 %
KETONES URINE: NEGATIVE
LEUKOCYTE ESTERASE URINE: NEGATIVE
LYMPHOCYTES # BLD AUTO: 1.67 K/UL
LYMPHOCYTES NFR BLD AUTO: 40 %
MAN DIFF?: NORMAL
MCHC RBC-ENTMCNC: 31.6 PG
MCHC RBC-ENTMCNC: 32 GM/DL
MCV RBC AUTO: 98.9 FL
MICROSCOPIC-UA: NORMAL
MONOCYTES # BLD AUTO: 0.34 K/UL
MONOCYTES NFR BLD AUTO: 8.1 %
NEUTROPHILS # BLD AUTO: 1.64 K/UL
NEUTROPHILS NFR BLD AUTO: 39.2 %
NITRITE URINE: NEGATIVE
PH URINE: 7
PLATELET # BLD AUTO: 227 K/UL
POTASSIUM SERPL-SCNC: 4.4 MMOL/L
PROT SERPL-MCNC: 7.2 G/DL
PROT UR-MCNC: 5 MG/DL
PROTEIN URINE: NEGATIVE
RBC # BLD: 3.7 M/UL
RBC # FLD: 13.6 %
RED BLOOD CELLS URINE: 1 /HPF
SODIUM SERPL-SCNC: 141 MMOL/L
SPECIFIC GRAVITY URINE: 1.02
SQUAMOUS EPITHELIAL CELLS: 1 /HPF
THYROGLOB AB SERPL-ACNC: <20 IU/ML
THYROPEROXIDASE AB SERPL IA-ACNC: 17 IU/ML
TSH SERPL-ACNC: 0.86 UIU/ML
UROBILINOGEN URINE: NORMAL
VIT B12 SERPL-MCNC: 981 PG/ML
WBC # FLD AUTO: 4.18 K/UL
WHITE BLOOD CELLS URINE: 0 /HPF

## 2019-07-22 ENCOUNTER — APPOINTMENT (OUTPATIENT)
Dept: ALLERGY | Facility: CLINIC | Age: 54
End: 2019-07-22
Payer: COMMERCIAL

## 2019-07-22 VITALS
OXYGEN SATURATION: 98 % | HEART RATE: 79 BPM | RESPIRATION RATE: 14 BRPM | BODY MASS INDEX: 25.71 KG/M2 | SYSTOLIC BLOOD PRESSURE: 136 MMHG | HEIGHT: 66 IN | DIASTOLIC BLOOD PRESSURE: 80 MMHG | WEIGHT: 160 LBS

## 2019-07-22 PROCEDURE — 99214 OFFICE O/P EST MOD 30 MIN: CPT

## 2019-07-22 NOTE — PHYSICAL EXAM
[Alert] : alert [Well Nourished] : well nourished [Healthy Appearance] : healthy appearance [No Acute Distress] : no acute distress [Well Developed] : well developed [Normal Voice/Communication] : normal voice communication [Conjunctival Erythema] : no conjunctival erythema [Suborbital Bogginess] : no suborbital bogginess (allergic shiners) [Normal Lips/Tongue] : the lips and tongue were normal [Normal Tonsils] : normal tonsils [No Oral Lesions or Ulcers] : no oral lesions or ulcers [Boggy Nasal Turbinates] : boggy and/or pale nasal turbinates [No Neck Mass] : no neck mass was observed [No LAD] : no lymphadenopathy [Supple] : the neck was supple [No Thyroid Mass] : no thyroid mass [Normal Rate and Effort] : normal respiratory rhythm and effort [No Crackles] : no crackles [No Retractions] : no retractions [Wheezing] : no wheezing was heard [Normal S1, S2] : normal S1 and S2 [Normal Rate] : heart rate was normal  [Regular Rhythm] : with a regular rhythm [Normal Cervical Lymph Nodes] : cervical [Skin Intact] : skin intact  [No Rash] : no rash [No clubbing] : no clubbing [No Edema] : no edema [No Cyanosis] : no cyanosis [Normal Mood] : mood was normal [Normal Affect] : affect was normal [Judgment and Insight Age Appropriate] : judgement and insight is age appropriate [Alert, Awake, Oriented as Age-Appropriate] : alert, awake, oriented as age appropriate

## 2019-07-22 NOTE — HISTORY OF PRESENT ILLNESS
[de-identified] : Leaving for [place] in 2 days - nasal congestion, itchy nose and throat and ears - SOB - coughing but no wheezing - taking Singulair - Xyzal - irregular use of Nasacort.

## 2019-07-22 NOTE — ASSESSMENT
[FreeTextEntry1] : Perennial and seasonal allergic rhinoconjunctivitis:\par \par Patient will be flying to [place] for son's wedding therefore will treated with Medrol pack\par Continue Xyzal 5 mg QD\par Continue Singulair QD\par Continue Nasacort AQ but use on a regular basis\par Ms. Garcia has consulted with rheumatology and she desires to start allergy immunotherapy - she will discuss the results of her lab work up with her physician to make sure her joint pain is not secondary to an underlying autoimmune disease.

## 2019-07-22 NOTE — SOCIAL HISTORY
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

## 2019-07-26 LAB
25(OH)D3 SERPL-MCNC: 24.5 NG/ML
ANA SER IF-ACNC: NEGATIVE
B2 GLYCOPROT1 AB SER QL: NEGATIVE
CARDIOLIPIN AB SER IA-ACNC: NEGATIVE
DSDNA AB SER-ACNC: <12 IU/ML
RNA POLYMERASE III IGG: <10 U

## 2019-08-09 ENCOUNTER — APPOINTMENT (OUTPATIENT)
Dept: PULMONOLOGY | Facility: CLINIC | Age: 54
End: 2019-08-09
Payer: COMMERCIAL

## 2019-08-09 VITALS
OXYGEN SATURATION: 100 % | HEIGHT: 66 IN | RESPIRATION RATE: 16 BRPM | WEIGHT: 160 LBS | SYSTOLIC BLOOD PRESSURE: 149 MMHG | BODY MASS INDEX: 25.71 KG/M2 | HEART RATE: 82 BPM | DIASTOLIC BLOOD PRESSURE: 90 MMHG

## 2019-08-09 PROCEDURE — 94060 EVALUATION OF WHEEZING: CPT

## 2019-08-09 PROCEDURE — 94664 DEMO&/EVAL PT USE INHALER: CPT | Mod: 59

## 2019-08-09 PROCEDURE — 99214 OFFICE O/P EST MOD 30 MIN: CPT | Mod: 25

## 2019-08-09 PROCEDURE — 94726 PLETHYSMOGRAPHY LUNG VOLUMES: CPT

## 2019-08-09 PROCEDURE — ZZZZZ: CPT

## 2019-08-09 PROCEDURE — 94729 DIFFUSING CAPACITY: CPT

## 2019-08-09 PROCEDURE — 94750: CPT

## 2019-08-09 RX ORDER — METHYLPREDNISOLONE 4 MG/1
4 TABLET ORAL
Qty: 1 | Refills: 0 | Status: DISCONTINUED | COMMUNITY
Start: 2019-07-22 | End: 2019-08-09

## 2019-08-09 RX ORDER — AMLODIPINE BESYLATE 5 MG/1
5 TABLET ORAL
Qty: 90 | Refills: 0 | Status: DISCONTINUED | COMMUNITY
Start: 2017-09-28 | End: 2019-08-09

## 2019-08-09 NOTE — PHYSICAL EXAM
[General Appearance - Well Developed] : well developed [Well Groomed] : well groomed [Normal Appearance] : normal appearance [No Deformities] : no deformities [General Appearance - Well Nourished] : well nourished [General Appearance - In No Acute Distress] : no acute distress [Normal Conjunctiva] : the conjunctiva exhibited no abnormalities [Eyelids - No Xanthelasma] : the eyelids demonstrated no xanthelasmas [Neck Appearance] : the appearance of the neck was normal [Neck Cervical Mass (___cm)] : no neck mass was observed [Jugular Venous Distention Increased] : there was no jugular-venous distention [Thyroid Diffuse Enlargement] : the thyroid was not enlarged [Heart Rate And Rhythm] : heart rate and rhythm were normal [Heart Sounds] : normal S1 and S2 [Arterial Pulses Normal] : the arterial pulses were normal [Edema] : no peripheral edema present [Veins - Varicosity Changes] : no varicosital changes were noted in the lower extremities [Respiration, Rhythm And Depth] : normal respiratory rhythm and effort [Chest Palpation] : palpation of the chest revealed no abnormalities [Exaggerated Use Of Accessory Muscles For Inspiration] : no accessory muscle use [Auscultation Breath Sounds / Voice Sounds] : lungs were clear to auscultation bilaterally [Lungs Percussion] : the lungs were normal to percussion [Bowel Sounds] : normal bowel sounds [Abdomen Soft] : soft [Abdomen Tenderness] : non-tender [Abdomen Mass (___ Cm)] : no abdominal mass palpated [Abnormal Walk] : normal gait [Gait - Sufficient For Exercise Testing] : the gait was sufficient for exercise testing [Nail Clubbing] : no clubbing of the fingernails [Cyanosis, Localized] : no localized cyanosis [Petechial Hemorrhages (___cm)] : no petechial hemorrhages [Skin Color & Pigmentation] : normal skin color and pigmentation [No Venous Stasis] : no venous stasis [] : no rash [No Skin Ulcers] : no skin ulcer [Skin Lesions] : no skin lesions [Deep Tendon Reflexes (DTR)] : deep tendon reflexes were 2+ and symmetric [No Xanthoma] : no  xanthoma was observed [Sensation] : the sensory exam was normal to light touch and pinprick [No Focal Deficits] : no focal deficits [Motor Exam] : the motor exam was normal [Oriented To Time, Place, And Person] : oriented to person, place, and time [Impaired Insight] : insight and judgment were intact [Affect] : the affect was normal [Mood] : the mood was normal [FreeTextEntry1] :  No Ausculatory wheeze or crackle

## 2019-08-09 NOTE — CONSULT LETTER
[Dear  ___] : Dear  [unfilled], [Courtesy Letter:] : I had the pleasure of seeing your patient, [unfilled], in my office today. [Consult Closing:] : Thank you very much for allowing me to participate in the care of this patient.  If you have any questions, please do not hesitate to contact me. [Please see my note below.] : Please see my note below. [Sincerely,] : Sincerely, [FreeTextEntry3] : Raghav Morales D.O., SUSANA\par  of Medicine\par [facility] [employer]\par

## 2019-08-09 NOTE — REVIEW OF SYSTEMS
[As Noted in HPI] : as noted in HPI [Nasal Congestion] : nasal congestion [Postnasal Drip] : postnasal drip [Sinus Problems] : sinus problems [Hypertension] : ~T hypertension [Reflux] : reflux [Negative] : Psychiatric [Dry Eyes] : no dryness of the eyes [Epistaxis] : no nosebleeds [Dry Mouth] : no dry mouth [Eye Irritation] : no ~T irritation of the eyes [Nausea] : no nausea [Mouth Ulcers] : no mouth ulcers [Vomiting] : no vomiting [Constipation] : no constipation [Diarrhea] : no diarrhea [Abdominal Pain] : no abdominal pain [Bleeding] : no bleeding [Diabetes] : no diabetes mellitus [Thyroid Problem] : no thyroid problem

## 2019-08-09 NOTE — HISTORY OF PRESENT ILLNESS
[FreeTextEntry1] : Allergy immunology evaluation testing treatment protocol noted\par Patient underwent a rheumatologic evaluation with a positive RNP and requested a repeat complete pulmonary function test\par Otherwise patient has no active respiratory symptoms.\par Denies any active wheezing cough with purulent sputum.  Denies lower extremity edema.\par

## 2019-08-09 NOTE — PROCEDURE
[FreeTextEntry1] : PFT August 9, 2019\par Spirometry normal\par No response to bronchodilator at FEV1\par Lung volumes normal\par Total lung capacity 99% predicted.\par Resistance and specific conductance normal\par Diffusion normal 87% predicted.\par Heme globin 11.7\par Data compared to January 22, 2019 demonstrates improvement of flow rates stable diffusion and interval improvement at the total lung capacity\par \par Chest x-ray PA lateral projection January 22, 2019\par Lung fields are clear\par Borderline cardiomegaly\par No dominant parenchymal infiltrates dominant pulmonary nodules pleural effusions pneumothorax\par Impression clear lungs\par \par Addendum data review January 23, 2018\par CBC\par White blood count 4.90 hemoglobin 12.4 hematocrit 38.4 platelet count 264,000\par Increased eosinophil count 15.7%\par \par 1/25/19 \par serum IgE elevated >3000\par RAST\par pos Dustmite\par pos Dog\par added Aspergilious  titers Addendum Negative\par Pos egg whites shell fish peanuts\par Aspergilius titers precipitins are both negative\par \par ADDENDUM\par Data review\par Sedimentation rate 34\par CBC 2.68 hemoglobin 12.5 hematocrit 38.1 platelet count 182,000\par \par 15.5% monocytes\par Eosinophils 6.7%

## 2019-08-09 NOTE — DISCUSSION/SUMMARY
[FreeTextEntry1] : Positive anti-RNP as per rheumatology\par Repeat pulmonary function testing with body box normal study with no decline in flow rates\par Clinical exam does not demonstrate findings consistent with interstitial lung disease without crackles at the bases\par Very elevated IgE level improved post steroids with significant positive food and asthma Rast testing\par Associated eosinophilia\par Consistent with Atopy\par Allergy f/u  with Mitchell Boxer\par Did recommend she have cardiology follow-up in a primary care physician she states his cardiologist for further evaluation as well\par \par  \par \par

## 2019-09-23 ENCOUNTER — APPOINTMENT (OUTPATIENT)
Dept: ALLERGY | Facility: CLINIC | Age: 54
End: 2019-09-23
Payer: COMMERCIAL

## 2019-09-23 PROCEDURE — 95004 PERQ TESTS W/ALRGNC XTRCS: CPT

## 2019-09-23 PROCEDURE — 95018 ALL TSTG PERQ&IQ DRUGS/BIOL: CPT

## 2019-10-10 ENCOUNTER — APPOINTMENT (OUTPATIENT)
Dept: RHEUMATOLOGY | Facility: CLINIC | Age: 54
End: 2019-10-10
Payer: COMMERCIAL

## 2019-10-10 VITALS
BODY MASS INDEX: 27.44 KG/M2 | OXYGEN SATURATION: 98 % | TEMPERATURE: 98.4 F | SYSTOLIC BLOOD PRESSURE: 148 MMHG | HEART RATE: 85 BPM | DIASTOLIC BLOOD PRESSURE: 86 MMHG | WEIGHT: 170 LBS

## 2019-10-10 DIAGNOSIS — R76.8 OTHER SPECIFIED ABNORMAL IMMUNOLOGICAL FINDINGS IN SERUM: ICD-10-CM

## 2019-10-10 PROCEDURE — 99214 OFFICE O/P EST MOD 30 MIN: CPT

## 2019-10-14 ENCOUNTER — APPOINTMENT (OUTPATIENT)
Dept: ALLERGY | Facility: CLINIC | Age: 54
End: 2019-10-14
Payer: COMMERCIAL

## 2019-10-14 ENCOUNTER — TRANSCRIPTION ENCOUNTER (OUTPATIENT)
Age: 54
End: 2019-10-14

## 2019-10-14 LAB
ALBUMIN SERPL ELPH-MCNC: 4.5 G/DL
ALP BLD-CCNC: 68 U/L
ALT SERPL-CCNC: 10 U/L
ANION GAP SERPL CALC-SCNC: 13 MMOL/L
APPEARANCE: CLEAR
APTT BLD: 35.7 SEC
AST SERPL-CCNC: 13 U/L
B2 GLYCOPROT1 AB SER QL: NEGATIVE
BACTERIA: NEGATIVE
BASOPHILS # BLD AUTO: 0.02 K/UL
BASOPHILS NFR BLD AUTO: 0.4 %
BILIRUB SERPL-MCNC: 0.3 MG/DL
BILIRUBIN URINE: NEGATIVE
BLOOD URINE: NEGATIVE
BUN SERPL-MCNC: 16 MG/DL
CALCIUM SERPL-MCNC: 9.9 MG/DL
CARDIOLIPIN AB SER IA-ACNC: NEGATIVE
CENTROMERE IGG SER-ACNC: 0.2 CD:130001892
CHLORIDE SERPL-SCNC: 101 MMOL/L
CO2 SERPL-SCNC: 23 MMOL/L
COLOR: YELLOW
CREAT SERPL-MCNC: 1 MG/DL
CREAT SPEC-SCNC: 224 MG/DL
CREAT/PROT UR: 0.1 RATIO
DSDNA AB SER-ACNC: <12 IU/ML
ENA RNP AB SER IA-ACNC: 2 AL
ENA SCL70 IGG SER IA-ACNC: <0.2 AL
ENA SM AB SER IA-ACNC: <0.2 AL
ENA SS-A AB SER IA-ACNC: <0.2 AL
ENA SS-B AB SER IA-ACNC: <0.2 AL
EOSINOPHIL # BLD AUTO: 0.66 K/UL
EOSINOPHIL NFR BLD AUTO: 12.7 %
GLUCOSE QUALITATIVE U: NEGATIVE
GLUCOSE SERPL-MCNC: 100 MG/DL
HCT VFR BLD CALC: 37.3 %
HGB BLD-MCNC: 12 G/DL
HYALINE CASTS: 0 /LPF
IMM GRANULOCYTES NFR BLD AUTO: 0 %
KETONES URINE: NEGATIVE
LEUKOCYTE ESTERASE URINE: NEGATIVE
LYMPHOCYTES # BLD AUTO: 2.13 K/UL
LYMPHOCYTES NFR BLD AUTO: 41 %
MAN DIFF?: NORMAL
MCHC RBC-ENTMCNC: 31.8 PG
MCHC RBC-ENTMCNC: 32.2 GM/DL
MCV RBC AUTO: 98.9 FL
MICROSCOPIC-UA: NORMAL
MONOCYTES # BLD AUTO: 0.41 K/UL
MONOCYTES NFR BLD AUTO: 7.9 %
NEUTROPHILS # BLD AUTO: 1.97 K/UL
NEUTROPHILS NFR BLD AUTO: 38 %
NITRITE URINE: NEGATIVE
PH URINE: 5.5
PLATELET # BLD AUTO: 216 K/UL
POTASSIUM SERPL-SCNC: 3.9 MMOL/L
PROT SERPL-MCNC: 7.3 G/DL
PROT UR-MCNC: 10 MG/DL
PROTEIN URINE: NORMAL
RBC # BLD: 3.77 M/UL
RBC # FLD: 13.9 %
RED BLOOD CELLS URINE: 7 /HPF
SODIUM SERPL-SCNC: 137 MMOL/L
SPECIFIC GRAVITY URINE: 1.03
SQUAMOUS EPITHELIAL CELLS: 2 /HPF
THYROGLOB AB SERPL-ACNC: <20 IU/ML
THYROPEROXIDASE AB SERPL IA-ACNC: 16.4 IU/ML
UROBILINOGEN URINE: NORMAL
WBC # FLD AUTO: 5.19 K/UL
WHITE BLOOD CELLS URINE: 0 /HPF

## 2019-10-14 PROCEDURE — 95117 IMMUNOTHERAPY INJECTIONS: CPT

## 2019-10-15 ENCOUNTER — TRANSCRIPTION ENCOUNTER (OUTPATIENT)
Age: 54
End: 2019-10-15

## 2019-10-17 LAB — RNA POLYMERASE III IGG: <10 U

## 2019-10-30 ENCOUNTER — APPOINTMENT (OUTPATIENT)
Dept: ALLERGY | Facility: CLINIC | Age: 54
End: 2019-10-30
Payer: COMMERCIAL

## 2019-10-30 PROCEDURE — 95117 IMMUNOTHERAPY INJECTIONS: CPT

## 2019-11-08 ENCOUNTER — APPOINTMENT (OUTPATIENT)
Dept: PULMONOLOGY | Facility: CLINIC | Age: 54
End: 2019-11-08

## 2019-11-13 ENCOUNTER — APPOINTMENT (OUTPATIENT)
Dept: ALLERGY | Facility: CLINIC | Age: 54
End: 2019-11-13
Payer: COMMERCIAL

## 2019-11-13 PROCEDURE — 95117 IMMUNOTHERAPY INJECTIONS: CPT

## 2019-11-20 ENCOUNTER — APPOINTMENT (OUTPATIENT)
Dept: ALLERGY | Facility: CLINIC | Age: 54
End: 2019-11-20
Payer: COMMERCIAL

## 2019-11-20 PROCEDURE — 95117 IMMUNOTHERAPY INJECTIONS: CPT

## 2019-11-27 ENCOUNTER — TRANSCRIPTION ENCOUNTER (OUTPATIENT)
Age: 54
End: 2019-11-27

## 2019-12-02 ENCOUNTER — APPOINTMENT (OUTPATIENT)
Dept: PULMONOLOGY | Facility: CLINIC | Age: 54
End: 2019-12-02

## 2019-12-05 ENCOUNTER — APPOINTMENT (OUTPATIENT)
Dept: ALLERGY | Facility: CLINIC | Age: 54
End: 2019-12-05
Payer: COMMERCIAL

## 2019-12-05 PROCEDURE — 95117 IMMUNOTHERAPY INJECTIONS: CPT

## 2020-01-27 ENCOUNTER — APPOINTMENT (OUTPATIENT)
Dept: ALLERGY | Facility: CLINIC | Age: 55
End: 2020-01-27
Payer: COMMERCIAL

## 2020-01-27 PROCEDURE — 95117 IMMUNOTHERAPY INJECTIONS: CPT

## 2020-01-30 ENCOUNTER — APPOINTMENT (OUTPATIENT)
Dept: ALLERGY | Facility: CLINIC | Age: 55
End: 2020-01-30
Payer: COMMERCIAL

## 2020-01-30 PROCEDURE — 95117 IMMUNOTHERAPY INJECTIONS: CPT

## 2020-02-04 ENCOUNTER — APPOINTMENT (OUTPATIENT)
Dept: ALLERGY | Facility: CLINIC | Age: 55
End: 2020-02-04
Payer: COMMERCIAL

## 2020-02-04 PROCEDURE — 95117 IMMUNOTHERAPY INJECTIONS: CPT

## 2020-02-13 ENCOUNTER — APPOINTMENT (OUTPATIENT)
Dept: ALLERGY | Facility: CLINIC | Age: 55
End: 2020-02-13
Payer: COMMERCIAL

## 2020-02-13 PROCEDURE — 95117 IMMUNOTHERAPY INJECTIONS: CPT

## 2020-02-18 ENCOUNTER — APPOINTMENT (OUTPATIENT)
Dept: ALLERGY | Facility: CLINIC | Age: 55
End: 2020-02-18
Payer: COMMERCIAL

## 2020-02-18 PROCEDURE — 95117 IMMUNOTHERAPY INJECTIONS: CPT

## 2020-02-27 ENCOUNTER — APPOINTMENT (OUTPATIENT)
Dept: ALLERGY | Facility: CLINIC | Age: 55
End: 2020-02-27
Payer: COMMERCIAL

## 2020-02-27 PROCEDURE — 95117 IMMUNOTHERAPY INJECTIONS: CPT

## 2020-03-05 ENCOUNTER — APPOINTMENT (OUTPATIENT)
Dept: ALLERGY | Facility: CLINIC | Age: 55
End: 2020-03-05
Payer: COMMERCIAL

## 2020-03-05 PROCEDURE — 95117 IMMUNOTHERAPY INJECTIONS: CPT

## 2020-03-12 ENCOUNTER — APPOINTMENT (OUTPATIENT)
Dept: ALLERGY | Facility: CLINIC | Age: 55
End: 2020-03-12
Payer: COMMERCIAL

## 2020-03-12 PROCEDURE — 95117 IMMUNOTHERAPY INJECTIONS: CPT

## 2020-03-29 ENCOUNTER — APPOINTMENT (OUTPATIENT)
Dept: DISASTER EMERGENCY | Facility: CLINIC | Age: 55
End: 2020-03-29

## 2020-03-29 ENCOUNTER — APPOINTMENT (OUTPATIENT)
Dept: DISASTER EMERGENCY | Facility: CLINIC | Age: 55
End: 2020-03-29
Payer: COMMERCIAL

## 2020-03-29 ENCOUNTER — TRANSCRIPTION ENCOUNTER (OUTPATIENT)
Age: 55
End: 2020-03-29

## 2020-03-29 PROCEDURE — ZZZZZ: CPT

## 2020-03-31 ENCOUNTER — EMERGENCY (EMERGENCY)
Facility: HOSPITAL | Age: 55
LOS: 1 days | Discharge: HOME | End: 2020-03-31
Attending: EMERGENCY MEDICINE
Payer: COMMERCIAL

## 2020-03-31 VITALS
TEMPERATURE: 98 F | OXYGEN SATURATION: 100 % | WEIGHT: 164.91 LBS | RESPIRATION RATE: 16 BRPM | SYSTOLIC BLOOD PRESSURE: 190 MMHG | HEART RATE: 97 BPM | HEIGHT: 66 IN | DIASTOLIC BLOOD PRESSURE: 102 MMHG

## 2020-03-31 DIAGNOSIS — Z98.89 OTHER SPECIFIED POSTPROCEDURAL STATES: Chronic | ICD-10-CM

## 2020-03-31 LAB
HCT VFR BLD CALC: 35.9 % — SIGNIFICANT CHANGE UP (ref 34.5–45)
HGB BLD-MCNC: 12.2 G/DL — SIGNIFICANT CHANGE UP (ref 11.5–15.5)
MCHC RBC-ENTMCNC: 31.5 PG — SIGNIFICANT CHANGE UP (ref 27–34)
MCHC RBC-ENTMCNC: 34 GM/DL — SIGNIFICANT CHANGE UP (ref 32–36)
MCV RBC AUTO: 92.8 FL — SIGNIFICANT CHANGE UP (ref 80–100)
PLATELET # BLD AUTO: 150 K/UL — SIGNIFICANT CHANGE UP (ref 150–400)
RBC # BLD: 3.87 M/UL — SIGNIFICANT CHANGE UP (ref 3.8–5.2)
RBC # FLD: 13.2 % — SIGNIFICANT CHANGE UP (ref 10.3–14.5)
WBC # BLD: 3.46 K/UL — LOW (ref 3.8–10.5)
WBC # FLD AUTO: 3.46 K/UL — LOW (ref 3.8–10.5)

## 2020-03-31 PROCEDURE — 99284 EMERGENCY DEPT VISIT MOD MDM: CPT

## 2020-03-31 PROCEDURE — 71045 X-RAY EXAM CHEST 1 VIEW: CPT | Mod: 26

## 2020-03-31 NOTE — ED ADULT NURSE NOTE - OBJECTIVE STATEMENT
54 year old Female, PMH: HTN. Patient comes to ER for chest pain that started last night, dissipated and returned. Patient states chest pain is located to left chest wall with radiation to left arm accompanied with numbness, tingling, sweating, sob and nausea. Patient currently feels chest "flutters". Patient rates pain 2/10 at this time. Denies self cardiac and family cardiac history. A&Ox3, breathing spontaneous and unlabored, palpable pulses, skin color normal for ethnicity. Denies headache, vision changes, shortness of breath, fever, chills, nausea, vomiting, diarrhea, numbness and tingling at current time. Bed locked and in lowest position with side rails up for safety.

## 2020-03-31 NOTE — ED PROVIDER NOTE - PATIENT PORTAL LINK FT
You can access the FollowMyHealth Patient Portal offered by [facility] by registering at the following website: [email or web address]. By joining [employer]’s FollowMyHealth portal, you will also be able to view your health information using other applications (apps) compatible with our system.

## 2020-03-31 NOTE — ED PROVIDER NOTE - NSFOLLOWUPINSTRUCTIONS_ED_ALL_ED_FT
While we do not know the exact cause of your chest pain, your workup today did not show any dangerous causes of it and we feel it is safe for you to go home.    However, there is always a chance that you are developing something we were unable to identify today. If your symptoms worsen, particularly if you have severe pain, trouble breathing, persistent vomiting, high fevers, if you pass out, or have any other major concern, come right back to the ED for further treatment.    Call your primary doctor to schedule follow up.       1. For your coronavirus, YOU MUST SELF-QUARANTINE until you are told to other otherwise by [facility] or the local [facility] department. This is extremely important to limit the spread of this virus. Please refer closely to the packet provided to you on the specifics of the process of self-quarantine.    2. Return to the ED for difficulty breathing.    3. You may over the counter acetaminophen (Tylenol) 650mg every 6 hours as needed for fever or pain. There is some concern in the medical community about using ibuprofen (Advil, Motrin) and other NSAIDs in people with COVID infections and until there is more research on this subject it may be best to avoid NSAIDs like ibuprofen at the moment unless you have an allergy to acetaminophen (Tylenol).  Do NOT exceed 3500mg acetaminophen in 24 hours.  Please do not take these medications if you do not have pain or fever or if you have any history of liver disease. notest

## 2020-03-31 NOTE — ED PROVIDER NOTE - PROGRESS NOTE DETAILS
Dr. Washington's note: At the beginning of my shift, I took over care of the patient from outgoing physician with plan to await repeat troponin. trop came back without significant change. pt safe for d/c with cp and coronavirus precautions.

## 2020-03-31 NOTE — ED PROVIDER NOTE - CLINICAL SUMMARY MEDICAL DECISION MAKING FREE TEXT BOX
RGUJRAL 55yo f hx HTN presents with chest pain since Saturday. States she was diagnosed w COVID last week, but developed L side chest pain that radiated to her arm on Saturday and than Sunday. Pt was told that if symptoms recurrent to go to ER. No sob, palp. No abd pain, n/v.   On exam, Patient is awake,alert,oriented x 3. Patient is well appearing and in no acute distress. Patient's chest is clear to ausculation, +s1s2. Abdomen is soft nd/nt +BS. Extremity with no swelling or calf tenderness.  Pt's EKG reviewed. Check labs, Trop and Xray.

## 2020-04-01 VITALS
RESPIRATION RATE: 18 BRPM | DIASTOLIC BLOOD PRESSURE: 84 MMHG | SYSTOLIC BLOOD PRESSURE: 160 MMHG | HEART RATE: 82 BPM | TEMPERATURE: 98 F | OXYGEN SATURATION: 97 %

## 2020-04-01 LAB
ALBUMIN SERPL ELPH-MCNC: 4.4 G/DL — SIGNIFICANT CHANGE UP (ref 3.3–5)
ALP SERPL-CCNC: 66 U/L — SIGNIFICANT CHANGE UP (ref 40–120)
ALT FLD-CCNC: 17 U/L — SIGNIFICANT CHANGE UP (ref 10–45)
ANION GAP SERPL CALC-SCNC: 16 MMOL/L — SIGNIFICANT CHANGE UP (ref 5–17)
AST SERPL-CCNC: 18 U/L — SIGNIFICANT CHANGE UP (ref 10–40)
BASOPHILS # BLD AUTO: 0.01 K/UL — SIGNIFICANT CHANGE UP (ref 0–0.2)
BASOPHILS NFR BLD AUTO: 0.3 % — SIGNIFICANT CHANGE UP (ref 0–2)
BILIRUB SERPL-MCNC: 0.4 MG/DL — SIGNIFICANT CHANGE UP (ref 0.2–1.2)
BUN SERPL-MCNC: 13 MG/DL — SIGNIFICANT CHANGE UP (ref 7–23)
CALCIUM SERPL-MCNC: 9.5 MG/DL — SIGNIFICANT CHANGE UP (ref 8.4–10.5)
CHLORIDE SERPL-SCNC: 100 MMOL/L — SIGNIFICANT CHANGE UP (ref 96–108)
CO2 SERPL-SCNC: 23 MMOL/L — SIGNIFICANT CHANGE UP (ref 22–31)
CREAT SERPL-MCNC: 0.87 MG/DL — SIGNIFICANT CHANGE UP (ref 0.5–1.3)
EOSINOPHIL # BLD AUTO: 0.02 K/UL — SIGNIFICANT CHANGE UP (ref 0–0.5)
EOSINOPHIL NFR BLD AUTO: 0.6 % — SIGNIFICANT CHANGE UP (ref 0–6)
GLUCOSE SERPL-MCNC: 99 MG/DL — SIGNIFICANT CHANGE UP (ref 70–99)
LYMPHOCYTES # BLD AUTO: 1.25 K/UL — SIGNIFICANT CHANGE UP (ref 1–3.3)
LYMPHOCYTES # BLD AUTO: 36.1 % — SIGNIFICANT CHANGE UP (ref 13–44)
MONOCYTES # BLD AUTO: 0.42 K/UL — SIGNIFICANT CHANGE UP (ref 0–0.9)
MONOCYTES NFR BLD AUTO: 12.1 % — SIGNIFICANT CHANGE UP (ref 2–14)
NEUTROPHILS # BLD AUTO: 1.76 K/UL — LOW (ref 1.8–7.4)
NEUTROPHILS NFR BLD AUTO: 50.9 % — SIGNIFICANT CHANGE UP (ref 43–77)
NRBC # BLD: 0 /100 WBCS — SIGNIFICANT CHANGE UP (ref 0–0)
POTASSIUM SERPL-MCNC: 3.3 MMOL/L — LOW (ref 3.5–5.3)
POTASSIUM SERPL-SCNC: 3.3 MMOL/L — LOW (ref 3.5–5.3)
PROT SERPL-MCNC: 8.2 G/DL — SIGNIFICANT CHANGE UP (ref 6–8.3)
SODIUM SERPL-SCNC: 139 MMOL/L — SIGNIFICANT CHANGE UP (ref 135–145)
TROPONIN T, HIGH SENSITIVITY RESULT: 7 NG/L — SIGNIFICANT CHANGE UP (ref 0–51)
TROPONIN T, HIGH SENSITIVITY RESULT: 8 NG/L — SIGNIFICANT CHANGE UP (ref 0–51)

## 2020-04-01 PROCEDURE — 93005 ELECTROCARDIOGRAM TRACING: CPT

## 2020-04-01 PROCEDURE — 80053 COMPREHEN METABOLIC PANEL: CPT

## 2020-04-01 PROCEDURE — 85027 COMPLETE CBC AUTOMATED: CPT

## 2020-04-01 PROCEDURE — 99283 EMERGENCY DEPT VISIT LOW MDM: CPT | Mod: 25

## 2020-04-01 PROCEDURE — 71045 X-RAY EXAM CHEST 1 VIEW: CPT

## 2020-04-01 PROCEDURE — 84484 ASSAY OF TROPONIN QUANT: CPT

## 2020-04-01 NOTE — ED ADULT NURSE REASSESSMENT NOTE - NS ED NURSE REASSESS COMMENT FT1
Pt discharged as per provider. Pt verbalizes understanding to discharge orders & will follow up with PMD post discharge. IV lock removed. No bleeding noted. Pt stable upon discharge. Pt leaving private via private car.

## 2020-04-02 ENCOUNTER — APPOINTMENT (OUTPATIENT)
Dept: PULMONOLOGY | Facility: CLINIC | Age: 55
End: 2020-04-02
Payer: COMMERCIAL

## 2020-04-02 PROCEDURE — 99214 OFFICE O/P EST MOD 30 MIN: CPT | Mod: 95

## 2020-04-02 NOTE — DISCUSSION/SUMMARY
[FreeTextEntry1] : Covid 19 positive\par Rule out LVH on EKG\par No evidence for an acute ischemic event based on the cardiogram and normal cardiac enzymes\par No evidence for a viral pneumonia\par Persistent a mild cough\par When things are stabilized with regard to the endemic she should have a cardiology evaluation for completeness\par She will remain in full self isolation quarantine for a full 2 weeks from the onset of symptoms as discussed\par Because she works for [employer] she will need to follow with employee health service for advised to return to work\par We will do a follow-up tele-med visit 1 week

## 2020-04-02 NOTE — PROCEDURE
[FreeTextEntry1] : Hospital data\par Chest x-ray March 31, 2020\par Borderline cardiomegaly\par Clear lungs\par No parenchymal infiltrates pleural effusions or dominant pulmonary nodules\par Official report clear lung\par \par Coronavirus 19 PCR positive\par Troponin negative\par CBC mild leukocytosis with white blood count 3.46\par Hematocrit 35.9\par Serum potassium 3.3\par Renal function normal\par \par EKG normal sinus rhythm voltage criteria for LVH versus normal variant no acute changes

## 2020-04-02 NOTE — PHYSICAL EXAM
[No Acute Distress] : no acute distress [No Resp Distress] : no resp distress [TextBox_68] : To talk in full sentences does not appear to be in distress does have an occasional cough as noted on this visit

## 2020-04-06 ENCOUNTER — EMERGENCY (EMERGENCY)
Facility: HOSPITAL | Age: 55
LOS: 1 days | Discharge: HOME | End: 2020-04-06
Attending: EMERGENCY MEDICINE
Payer: COMMERCIAL

## 2020-04-06 ENCOUNTER — APPOINTMENT (OUTPATIENT)
Dept: RHEUMATOLOGY | Facility: CLINIC | Age: 55
End: 2020-04-06

## 2020-04-06 VITALS
OXYGEN SATURATION: 100 % | WEIGHT: 164.91 LBS | TEMPERATURE: 98 F | SYSTOLIC BLOOD PRESSURE: 169 MMHG | DIASTOLIC BLOOD PRESSURE: 86 MMHG | HEIGHT: 66 IN | RESPIRATION RATE: 24 BRPM | HEART RATE: 98 BPM

## 2020-04-06 VITALS
SYSTOLIC BLOOD PRESSURE: 151 MMHG | HEART RATE: 90 BPM | OXYGEN SATURATION: 100 % | DIASTOLIC BLOOD PRESSURE: 86 MMHG | RESPIRATION RATE: 18 BRPM

## 2020-04-06 DIAGNOSIS — Z98.89 OTHER SPECIFIED POSTPROCEDURAL STATES: Chronic | ICD-10-CM

## 2020-04-06 LAB
ALBUMIN SERPL ELPH-MCNC: 4.6 G/DL — SIGNIFICANT CHANGE UP (ref 3.3–5)
ALP SERPL-CCNC: 70 U/L — SIGNIFICANT CHANGE UP (ref 40–120)
ALT FLD-CCNC: 13 U/L — SIGNIFICANT CHANGE UP (ref 10–45)
ANION GAP SERPL CALC-SCNC: 16 MMOL/L — SIGNIFICANT CHANGE UP (ref 5–17)
APTT BLD: 37.3 SEC — HIGH (ref 27.5–36.3)
AST SERPL-CCNC: 14 U/L — SIGNIFICANT CHANGE UP (ref 10–40)
BASOPHILS # BLD AUTO: 0.01 K/UL — SIGNIFICANT CHANGE UP (ref 0–0.2)
BASOPHILS # BLD AUTO: 0.01 K/UL — SIGNIFICANT CHANGE UP (ref 0–0.2)
BASOPHILS NFR BLD AUTO: 0.1 % — SIGNIFICANT CHANGE UP (ref 0–2)
BASOPHILS NFR BLD AUTO: 0.2 % — SIGNIFICANT CHANGE UP (ref 0–2)
BILIRUB SERPL-MCNC: 0.5 MG/DL — SIGNIFICANT CHANGE UP (ref 0.2–1.2)
BUN SERPL-MCNC: 22 MG/DL — SIGNIFICANT CHANGE UP (ref 7–23)
CALCIUM SERPL-MCNC: 10.3 MG/DL — SIGNIFICANT CHANGE UP (ref 8.4–10.5)
CHLORIDE SERPL-SCNC: 100 MMOL/L — SIGNIFICANT CHANGE UP (ref 96–108)
CO2 SERPL-SCNC: 23 MMOL/L — SIGNIFICANT CHANGE UP (ref 22–31)
CREAT SERPL-MCNC: 1.13 MG/DL — SIGNIFICANT CHANGE UP (ref 0.5–1.3)
EOSINOPHIL # BLD AUTO: 0.03 K/UL — SIGNIFICANT CHANGE UP (ref 0–0.5)
EOSINOPHIL # BLD AUTO: 0.04 K/UL — SIGNIFICANT CHANGE UP (ref 0–0.5)
EOSINOPHIL NFR BLD AUTO: 0.5 % — SIGNIFICANT CHANGE UP (ref 0–6)
EOSINOPHIL NFR BLD AUTO: 0.5 % — SIGNIFICANT CHANGE UP (ref 0–6)
GAS PNL BLDV: SIGNIFICANT CHANGE UP
GLUCOSE SERPL-MCNC: 129 MG/DL — HIGH (ref 70–99)
HCT VFR BLD CALC: 35.9 % — SIGNIFICANT CHANGE UP (ref 34.5–45)
HCT VFR BLD CALC: 37.5 % — SIGNIFICANT CHANGE UP (ref 34.5–45)
HGB BLD-MCNC: 11.7 G/DL — SIGNIFICANT CHANGE UP (ref 11.5–15.5)
HGB BLD-MCNC: 12.9 G/DL — SIGNIFICANT CHANGE UP (ref 11.5–15.5)
IMM GRANULOCYTES NFR BLD AUTO: 0.4 % — SIGNIFICANT CHANGE UP (ref 0–1.5)
IMM GRANULOCYTES NFR BLD AUTO: 0.5 % — SIGNIFICANT CHANGE UP (ref 0–1.5)
INR BLD: 1.14 RATIO — SIGNIFICANT CHANGE UP (ref 0.88–1.16)
LYMPHOCYTES # BLD AUTO: 1.09 K/UL — SIGNIFICANT CHANGE UP (ref 1–3.3)
LYMPHOCYTES # BLD AUTO: 1.2 K/UL — SIGNIFICANT CHANGE UP (ref 1–3.3)
LYMPHOCYTES # BLD AUTO: 15.5 % — SIGNIFICANT CHANGE UP (ref 13–44)
LYMPHOCYTES # BLD AUTO: 16.8 % — SIGNIFICANT CHANGE UP (ref 13–44)
MCHC RBC-ENTMCNC: 30.3 PG — SIGNIFICANT CHANGE UP (ref 27–34)
MCHC RBC-ENTMCNC: 31.2 PG — SIGNIFICANT CHANGE UP (ref 27–34)
MCHC RBC-ENTMCNC: 32.6 GM/DL — SIGNIFICANT CHANGE UP (ref 32–36)
MCHC RBC-ENTMCNC: 34.4 GM/DL — SIGNIFICANT CHANGE UP (ref 32–36)
MCV RBC AUTO: 90.6 FL — SIGNIFICANT CHANGE UP (ref 80–100)
MCV RBC AUTO: 93 FL — SIGNIFICANT CHANGE UP (ref 80–100)
MONOCYTES # BLD AUTO: 0.69 K/UL — SIGNIFICANT CHANGE UP (ref 0–0.9)
MONOCYTES # BLD AUTO: 0.78 K/UL — SIGNIFICANT CHANGE UP (ref 0–0.9)
MONOCYTES NFR BLD AUTO: 10.1 % — SIGNIFICANT CHANGE UP (ref 2–14)
MONOCYTES NFR BLD AUTO: 10.6 % — SIGNIFICANT CHANGE UP (ref 2–14)
NEUTROPHILS # BLD AUTO: 4.65 K/UL — SIGNIFICANT CHANGE UP (ref 1.8–7.4)
NEUTROPHILS # BLD AUTO: 5.68 K/UL — SIGNIFICANT CHANGE UP (ref 1.8–7.4)
NEUTROPHILS NFR BLD AUTO: 71.4 % — SIGNIFICANT CHANGE UP (ref 43–77)
NEUTROPHILS NFR BLD AUTO: 73.4 % — SIGNIFICANT CHANGE UP (ref 43–77)
NRBC # BLD: 0 /100 WBCS — SIGNIFICANT CHANGE UP (ref 0–0)
NRBC # BLD: 0 /100 WBCS — SIGNIFICANT CHANGE UP (ref 0–0)
PLATELET # BLD AUTO: 248 K/UL — SIGNIFICANT CHANGE UP (ref 150–400)
PLATELET # BLD AUTO: 251 K/UL — SIGNIFICANT CHANGE UP (ref 150–400)
POTASSIUM SERPL-MCNC: 3.1 MMOL/L — LOW (ref 3.5–5.3)
POTASSIUM SERPL-SCNC: 3.1 MMOL/L — LOW (ref 3.5–5.3)
PROT SERPL-MCNC: 8.6 G/DL — HIGH (ref 6–8.3)
PROTHROM AB SERPL-ACNC: 13.1 SEC — HIGH (ref 10–12.9)
RBC # BLD: 3.86 M/UL — SIGNIFICANT CHANGE UP (ref 3.8–5.2)
RBC # BLD: 4.14 M/UL — SIGNIFICANT CHANGE UP (ref 3.8–5.2)
RBC # FLD: 12.5 % — SIGNIFICANT CHANGE UP (ref 10.3–14.5)
RBC # FLD: 12.6 % — SIGNIFICANT CHANGE UP (ref 10.3–14.5)
SARS-COV-2 N GENE NPH QL NAA+PROBE: DETECTED
SODIUM SERPL-SCNC: 139 MMOL/L — SIGNIFICANT CHANGE UP (ref 135–145)
TROPONIN T, HIGH SENSITIVITY RESULT: 14 NG/L — SIGNIFICANT CHANGE UP (ref 0–51)
TROPONIN T, HIGH SENSITIVITY RESULT: 14 NG/L — SIGNIFICANT CHANGE UP (ref 0–51)
WBC # BLD: 6.5 K/UL — SIGNIFICANT CHANGE UP (ref 3.8–10.5)
WBC # BLD: 7.74 K/UL — SIGNIFICANT CHANGE UP (ref 3.8–10.5)
WBC # FLD AUTO: 6.5 K/UL — SIGNIFICANT CHANGE UP (ref 3.8–10.5)
WBC # FLD AUTO: 7.74 K/UL — SIGNIFICANT CHANGE UP (ref 3.8–10.5)

## 2020-04-06 PROCEDURE — 85014 HEMATOCRIT: CPT

## 2020-04-06 PROCEDURE — 99284 EMERGENCY DEPT VISIT MOD MDM: CPT | Mod: 25

## 2020-04-06 PROCEDURE — 82803 BLOOD GASES ANY COMBINATION: CPT

## 2020-04-06 PROCEDURE — 71045 X-RAY EXAM CHEST 1 VIEW: CPT | Mod: 26

## 2020-04-06 PROCEDURE — 82330 ASSAY OF CALCIUM: CPT

## 2020-04-06 PROCEDURE — 85730 THROMBOPLASTIN TIME PARTIAL: CPT

## 2020-04-06 PROCEDURE — 85027 COMPLETE CBC AUTOMATED: CPT

## 2020-04-06 PROCEDURE — 85610 PROTHROMBIN TIME: CPT

## 2020-04-06 PROCEDURE — 99285 EMERGENCY DEPT VISIT HI MDM: CPT

## 2020-04-06 PROCEDURE — 84295 ASSAY OF SERUM SODIUM: CPT

## 2020-04-06 PROCEDURE — 93005 ELECTROCARDIOGRAM TRACING: CPT

## 2020-04-06 PROCEDURE — 36415 COLL VENOUS BLD VENIPUNCTURE: CPT

## 2020-04-06 PROCEDURE — 80053 COMPREHEN METABOLIC PANEL: CPT

## 2020-04-06 PROCEDURE — 84484 ASSAY OF TROPONIN QUANT: CPT

## 2020-04-06 PROCEDURE — 82435 ASSAY OF BLOOD CHLORIDE: CPT

## 2020-04-06 PROCEDURE — 71045 X-RAY EXAM CHEST 1 VIEW: CPT

## 2020-04-06 PROCEDURE — 93010 ELECTROCARDIOGRAM REPORT: CPT

## 2020-04-06 PROCEDURE — 82947 ASSAY GLUCOSE BLOOD QUANT: CPT

## 2020-04-06 PROCEDURE — 84132 ASSAY OF SERUM POTASSIUM: CPT

## 2020-04-06 PROCEDURE — 83605 ASSAY OF LACTIC ACID: CPT

## 2020-04-06 RX ORDER — POTASSIUM CHLORIDE 20 MEQ
40 PACKET (EA) ORAL ONCE
Refills: 0 | Status: COMPLETED | OUTPATIENT
Start: 2020-04-06 | End: 2020-04-06

## 2020-04-06 RX ORDER — ACETAMINOPHEN 500 MG
975 TABLET ORAL ONCE
Refills: 0 | Status: COMPLETED | OUTPATIENT
Start: 2020-04-06 | End: 2020-04-06

## 2020-04-06 RX ADMIN — Medication 40 MILLIEQUIVALENT(S): at 10:36

## 2020-04-06 RX ADMIN — Medication 975 MILLIGRAM(S): at 08:25

## 2020-04-06 NOTE — ED ADULT NURSE NOTE - OBJECTIVE STATEMENT
Patient is a 54 year old female with a PMH of HTN who presents to the ED coming from home complaining of chest pain/palpatations. Patient states she was tested positive for Covid outpatient and was here in the ED a week ago due to SOB/chest pain. Patient states she was sent home due to everything coming back normal and was told to come back if it got worse or it came back. Patient states she has been having on/off again chest pain/palpatations for the past week but last night it got worse when she was lying down. Patient states she had a fever last friday and this morning she took 3-4 baby aspirins. Patient says she has been feeling left hand numbness and left sided chest discomfort for the past week. Upon assessment, patient is AOx4, afebrile at 98.5F orally, lung sounds diminished upon auscultation Patient is a 54 year old female with a PMH of HTN who presents to the ED coming from home complaining of chest pain/palpatations. Patient states she tested positive for Covid outpatient and was here in the ED a week ago due to SOB/chest pain. Patient states she was sent home due to everything coming back normal and was told to come back if it got worse or it came back. Patient states she has been having on/off again chest pain/palpatations with some nausea for the past week but last night it got worse when she was lying down. Patient states she had a fever last friday and this morning she took 3-4 baby aspirins. Patient says she has been feeling left hand numbness and left sided chest discomfort for the past week, but pain does not radiate anywhere else. Upon assessment, patient is AOx4, afebrile at 98.5F orally, lung sounds diminished upon auscultation, breathing spontaneously with some cough, sating 100% on RA, placed on CM NSR, abdomen soft, +2 pulses, skin intact, and denies chills/headache/d/v. Heplock placed and labs drawn. EKG performed and provider assessed patient at bedside, all safety precautions maintained, call bell at bedside, and will continue to monitor.

## 2020-04-06 NOTE — ED PROVIDER NOTE - NSFOLLOWUPINSTRUCTIONS_ED_ALL_ED_FT
You were seen and evaluated for COVID 19 infection. We think you are safe for discharge and would like you to follow up in a two to three days with your doctor by phone or in person.     You should treat fevers by taking Tylenol as needed.    You should stay hydrated and increase the amount of fluid you drink.  Return to the Emergency Department if your shortness of breath becomes worse, you become weak, or new symptoms develop.    You should monitor your temperature and quarantine yourself away from others.     We recommend the below precautionary steps from now until 7 days from when you returned from your travel or date of your last known possible contact:  - Do not go to work, school, or public areas. Avoid using public transportation, ride-sharing, or taxis.  -Wear a mask whenever you are around other people.  -Avoid sharing personal household items. You should not share dishes, drinking glasses, cups, eating utensils, towels, or bedding with other people or pets in your home. After using these items, they should be washed thoroughly with soap and water.   - Avoid touching your eyes, nose, and mouth with your hands.  - Wash your hands often with soap and water for at least 15 to 20 seconds or clean your hands with an alcohol-based hand  that contains 60 to 95% alcohol, covering all surfaces of your hands and rubbing them together until they feel dry. Soap and water should be used preferentially if hands are visibly dirty.

## 2020-04-06 NOTE — ED PROVIDER NOTE - ATTENDING CONTRIBUTION TO CARE
54y Female pmh HTN, Dx Covid 1w ago, Subsequenly seen in ed with palps now comes to ed with complains of palps at rest Toya while trying to sleep last night, fatigued, weak, NO NVDC, PE WDWN female nad normocephalic atraumatic neck supple chest clear anterior & posterior abd soft +Bs no mass guarding cv no rubs, gallops or murmurs, neuro no focal defects.   Ino Aquino MD, Facep

## 2020-04-06 NOTE — ED PROVIDER NOTE - CLINICAL SUMMARY MEDICAL DECISION MAKING FREE TEXT BOX
54 F p/w palpitations, cp, sob in setting of recent covid infection. Appears in NAD  on exam and VS wnl. Suspect sequelae of mild COVID infection; low suspicion for cardiac etiology as sx not exertional, started randomly while resting and pt is known COVID +ve. Will obtain basic labs, CE, EKG, CXR, reassess

## 2020-04-06 NOTE — ED PROVIDER NOTE - PATIENT PORTAL LINK FT
You can access the FollowMyHealth Patient Portal offered by [facility] by registering at the following website: [email or web address]. By joining [employer]’s FollowMyHealth portal, you will also be able to view your health information using other applications (apps) compatible with our system.

## 2020-04-06 NOTE — ED PROVIDER NOTE - CARE PROVIDER_API CALL
Brody Mock (MD)  Cardiovascular Disease; Internal Medicine  [address] [address]  Phone: [phone]  Fax: [phone]  Follow Up Time:

## 2020-04-06 NOTE — ED ADULT NURSE NOTE - PSH
Born by normal vaginal delivery  2 vaginal deliveries  H/O myomectomy  2007 for fibroid removal  History of lumpectomy of both breasts  pt reports multiple biopsies and lumpectomy of both breasts

## 2020-04-06 NOTE — ED PROVIDER NOTE - NS ED ROS FT
Gen: fever  Eyes: No eye irritation or discharge  ENT: No ear pain, congestion, sore throat  Resp: cough, sob  Cardiovascular: chest pain  Gastroenteric: nausea  :  No change in urine output; no dysuria  MS: muscle pain  Skin: No rashes  Neuro: No headache; no abnormal movements  Remainder negative, except as per the HPI

## 2020-04-06 NOTE — ED PROVIDER NOTE - OBJECTIVE STATEMENT
54 F p/w palpitations, sob, and chest pain. Diagnosed with COVID 1 week ago; and a few days later started having sob and chest pain was seen in ER had basic workup and was discharged. Was told to return if sx worsening; since then sx have been on/off and last night sx worsened. Sx got worse suddenly last while at rest last night.  Feels SoB now and chest pain while sitting at rest; no palpitations. Feels generally weak, arm and back pain. +fever to 100.7, cough, sore throat now resolved. 54 F HTN p/w palpitations, sob, and chest pain. Diagnosed with COVID 1 week ago; and a few days later started having sob and chest pain was seen in ER had basic workup and was discharged. Was told to return if sx worsening; since then sx have been on/off and last night sx worsened. Sx got worse suddenly last while at rest last night.  Feels SoB now and chest pain while sitting at rest; no palpitations. Feels generally weak, arm and back pain. +fever to 100.7, nausea, cough, sore throat now resolved.  Last took nyquil 2d ago at home.    PSHx hysterectomy, breast biopsies w/o malignancy. never smoker, no fhx  of early cardiac disease.

## 2020-04-06 NOTE — ED PROVIDER NOTE - PHYSICAL EXAMINATION
General: Alert and Oriented. No apparent distress.  Head: Normocephalic and atraumatic.  Eyes: PERRLA with EOMI.  Neck: Supple. Trachea midline.   Cardiac: Normal S1 and S2 w/ RRR. No murmurs appreciated. no LE edema  Pulmonary: Vesicular breath sounds bilaterally. No increased WOB. No wheezes or crackles.  Abdominal: Soft, non-tender.   Neurologic: No focal sensory or motor deficits.  Musculoskeletal: Strength appropriate in all 4 extremities for age with no limited ROM.  Skin: Color appropriate for race. Intact, warm, and well-perfused.  Psychiatric: Appropriate mood and affect. No apparent risk to self or others.

## 2020-04-10 ENCOUNTER — APPOINTMENT (OUTPATIENT)
Dept: PULMONOLOGY | Facility: CLINIC | Age: 55
End: 2020-04-10
Payer: COMMERCIAL

## 2020-04-10 PROCEDURE — 99213 OFFICE O/P EST LOW 20 MIN: CPT | Mod: 95

## 2020-04-10 NOTE — DISCUSSION/SUMMARY
[FreeTextEntry1] : COVID 19\par  SBO EUCEDA\par Chest pain-allergy has patient on metoprolol with pending echocardiogram\par Very elevated IgE level with significant positive food and asthma Rast testing\par Associated eosinophilia\par Consistent with Atopy\par Aerated Flovent 110 MCG 2 puffs twice daily with instructions to rinse and pro-air for rescue therapy\par Recheck CBC and sedimentation rate\par Allergy evaluation recommended with Mitchell Boxer\par Also benefit from a workup to exclude monoclonal gammopathy\par Is no evidence to suggest parasitic type of infections without any significant or high risk travel\par Then shortly she will need a complete PFT follow-up chest x-ray\par We will schedule a 1 month telemedicine visit and convert based upon office opening\par \par

## 2020-04-10 NOTE — HISTORY OF PRESENT ILLNESS
[Home] : at home, [unfilled] , at the time of the visit. [Medical Office: ([facility])___] : at ~his/her~ medical office located in [place] [Patient] : the patient [TextBox_4] : This visit was provided via telehealth using real-time 2-way audio visual technology. The patient, KRISTOPHER RAMIREZ , was located at home, [address] [facility] [place] [address]  at the time of the visit.\par The provider, Dr. Raghav Morales, was located at office [address] [place] [address] at the time of the visit. \par \par  The patient, Ms. KRISTOPHER RAMIREZ  and Physician Raghav Morales  DO [facility] participated in the telehealth encounter.\par \par Verbal consent obtained by  from patient\par \par COVID 19\par Duration is just greater than 2 weeks\par She is been afebrile for greater than 1 week\par Still feels a little bit tired\par Still has these complaints of shortness of breath and is atypical chest pain which may be residual from the virus itself\par She did have a cardiology evaluation\par She was placed on metoprolol though some question whether she 1 day prior at some reaction and did not feel good with it but she is working this out with cardiology in terms of dosing and timing\par Currently she has no other significant cough\par Has been in touch with [facility] health service and there is pending if follow-up call in the next several days with a possible return to work next week\par

## 2020-04-10 NOTE — REVIEW OF SYSTEMS
[Fever] : no fever [Fatigue] : fatigue [Chills] : no chills [Poor Appetite] : no poor appetite [SOB on Exertion] : sob on exertion [Chest Discomfort] : chest discomfort [Arthralgias] : no arthralgias [Myalgias] : no myalgias [Back Pain] : no back pain [Negative] : Genitourinary [TextBox_30] : mild SOB

## 2020-04-12 ENCOUNTER — EMERGENCY (EMERGENCY)
Facility: HOSPITAL | Age: 55
LOS: 1 days | Discharge: HOME | End: 2020-04-12
Payer: COMMERCIAL

## 2020-04-12 VITALS
DIASTOLIC BLOOD PRESSURE: 80 MMHG | RESPIRATION RATE: 22 BRPM | TEMPERATURE: 99 F | OXYGEN SATURATION: 100 % | WEIGHT: 160.06 LBS | SYSTOLIC BLOOD PRESSURE: 147 MMHG | HEART RATE: 106 BPM | HEIGHT: 66 IN

## 2020-04-12 VITALS
RESPIRATION RATE: 18 BRPM | SYSTOLIC BLOOD PRESSURE: 137 MMHG | DIASTOLIC BLOOD PRESSURE: 84 MMHG | HEART RATE: 80 BPM | TEMPERATURE: 98 F | OXYGEN SATURATION: 100 %

## 2020-04-12 DIAGNOSIS — Z98.89 OTHER SPECIFIED POSTPROCEDURAL STATES: Chronic | ICD-10-CM

## 2020-04-12 LAB
ALBUMIN SERPL ELPH-MCNC: 4.7 G/DL — SIGNIFICANT CHANGE UP (ref 3.3–5)
ALP SERPL-CCNC: 75 U/L — SIGNIFICANT CHANGE UP (ref 40–120)
ALT FLD-CCNC: 14 U/L — SIGNIFICANT CHANGE UP (ref 10–45)
ANION GAP SERPL CALC-SCNC: 17 MMOL/L — SIGNIFICANT CHANGE UP (ref 5–17)
ANION GAP SERPL CALC-SCNC: 18 MMOL/L — HIGH (ref 5–17)
APTT BLD: 38.3 SEC — HIGH (ref 27.5–36.3)
AST SERPL-CCNC: 17 U/L — SIGNIFICANT CHANGE UP (ref 10–40)
BASOPHILS # BLD AUTO: 0.02 K/UL — SIGNIFICANT CHANGE UP (ref 0–0.2)
BASOPHILS NFR BLD AUTO: 0.4 % — SIGNIFICANT CHANGE UP (ref 0–2)
BILIRUB SERPL-MCNC: 0.6 MG/DL — SIGNIFICANT CHANGE UP (ref 0.2–1.2)
BUN SERPL-MCNC: 14 MG/DL — SIGNIFICANT CHANGE UP (ref 7–23)
BUN SERPL-MCNC: 16 MG/DL — SIGNIFICANT CHANGE UP (ref 7–23)
CALCIUM SERPL-MCNC: 10.6 MG/DL — HIGH (ref 8.4–10.5)
CALCIUM SERPL-MCNC: 9.7 MG/DL — SIGNIFICANT CHANGE UP (ref 8.4–10.5)
CHLORIDE SERPL-SCNC: 101 MMOL/L — SIGNIFICANT CHANGE UP (ref 96–108)
CHLORIDE SERPL-SCNC: 99 MMOL/L — SIGNIFICANT CHANGE UP (ref 96–108)
CK MB BLD-MCNC: 1.1 % — SIGNIFICANT CHANGE UP (ref 0–3.5)
CK MB CFR SERPL CALC: 1 NG/ML — SIGNIFICANT CHANGE UP (ref 0–3.8)
CK SERPL-CCNC: 90 U/L — SIGNIFICANT CHANGE UP (ref 25–170)
CO2 SERPL-SCNC: 21 MMOL/L — LOW (ref 22–31)
CO2 SERPL-SCNC: 22 MMOL/L — SIGNIFICANT CHANGE UP (ref 22–31)
CREAT SERPL-MCNC: 0.92 MG/DL — SIGNIFICANT CHANGE UP (ref 0.5–1.3)
CREAT SERPL-MCNC: 0.99 MG/DL — SIGNIFICANT CHANGE UP (ref 0.5–1.3)
EOSINOPHIL # BLD AUTO: 0.04 K/UL — SIGNIFICANT CHANGE UP (ref 0–0.5)
EOSINOPHIL NFR BLD AUTO: 0.9 % — SIGNIFICANT CHANGE UP (ref 0–6)
GLUCOSE SERPL-MCNC: 101 MG/DL — HIGH (ref 70–99)
GLUCOSE SERPL-MCNC: 106 MG/DL — HIGH (ref 70–99)
HBA1C BLD-MCNC: 5.8 % — HIGH (ref 4–5.6)
HCT VFR BLD CALC: 37.6 % — SIGNIFICANT CHANGE UP (ref 34.5–45)
HGB BLD-MCNC: 12.4 G/DL — SIGNIFICANT CHANGE UP (ref 11.5–15.5)
IMM GRANULOCYTES NFR BLD AUTO: 0.6 % — SIGNIFICANT CHANGE UP (ref 0–1.5)
INR BLD: 1.09 RATIO — SIGNIFICANT CHANGE UP (ref 0.88–1.16)
LYMPHOCYTES # BLD AUTO: 1.39 K/UL — SIGNIFICANT CHANGE UP (ref 1–3.3)
LYMPHOCYTES # BLD AUTO: 30 % — SIGNIFICANT CHANGE UP (ref 13–44)
MCHC RBC-ENTMCNC: 30.2 PG — SIGNIFICANT CHANGE UP (ref 27–34)
MCHC RBC-ENTMCNC: 33 GM/DL — SIGNIFICANT CHANGE UP (ref 32–36)
MCV RBC AUTO: 91.7 FL — SIGNIFICANT CHANGE UP (ref 80–100)
MONOCYTES # BLD AUTO: 0.53 K/UL — SIGNIFICANT CHANGE UP (ref 0–0.9)
MONOCYTES NFR BLD AUTO: 11.4 % — SIGNIFICANT CHANGE UP (ref 2–14)
NEUTROPHILS # BLD AUTO: 2.62 K/UL — SIGNIFICANT CHANGE UP (ref 1.8–7.4)
NEUTROPHILS NFR BLD AUTO: 56.7 % — SIGNIFICANT CHANGE UP (ref 43–77)
NRBC # BLD: 0 /100 WBCS — SIGNIFICANT CHANGE UP (ref 0–0)
NT-PROBNP SERPL-SCNC: 23 PG/ML — SIGNIFICANT CHANGE UP (ref 0–300)
PLATELET # BLD AUTO: 348 K/UL — SIGNIFICANT CHANGE UP (ref 150–400)
POTASSIUM SERPL-MCNC: 2.7 MMOL/L — CRITICAL LOW (ref 3.5–5.3)
POTASSIUM SERPL-MCNC: 3.6 MMOL/L — SIGNIFICANT CHANGE UP (ref 3.5–5.3)
POTASSIUM SERPL-SCNC: 2.7 MMOL/L — CRITICAL LOW (ref 3.5–5.3)
POTASSIUM SERPL-SCNC: 3.6 MMOL/L — SIGNIFICANT CHANGE UP (ref 3.5–5.3)
PROT SERPL-MCNC: 8.7 G/DL — HIGH (ref 6–8.3)
PROTHROM AB SERPL-ACNC: 12.6 SEC — SIGNIFICANT CHANGE UP (ref 10–12.9)
RBC # BLD: 4.1 M/UL — SIGNIFICANT CHANGE UP (ref 3.8–5.2)
RBC # FLD: 12 % — SIGNIFICANT CHANGE UP (ref 10.3–14.5)
SODIUM SERPL-SCNC: 138 MMOL/L — SIGNIFICANT CHANGE UP (ref 135–145)
SODIUM SERPL-SCNC: 140 MMOL/L — SIGNIFICANT CHANGE UP (ref 135–145)
TROPONIN T, HIGH SENSITIVITY RESULT: <6 NG/L — SIGNIFICANT CHANGE UP (ref 0–51)
TSH SERPL-MCNC: 0.49 UIU/ML — SIGNIFICANT CHANGE UP (ref 0.27–4.2)
WBC # BLD: 4.63 K/UL — SIGNIFICANT CHANGE UP (ref 3.8–10.5)
WBC # FLD AUTO: 4.63 K/UL — SIGNIFICANT CHANGE UP (ref 3.8–10.5)

## 2020-04-12 PROCEDURE — 83036 HEMOGLOBIN GLYCOSYLATED A1C: CPT

## 2020-04-12 PROCEDURE — 84443 ASSAY THYROID STIM HORMONE: CPT

## 2020-04-12 PROCEDURE — 93010 ELECTROCARDIOGRAM REPORT: CPT

## 2020-04-12 PROCEDURE — 96374 THER/PROPH/DIAG INJ IV PUSH: CPT | Mod: XU

## 2020-04-12 PROCEDURE — 83880 ASSAY OF NATRIURETIC PEPTIDE: CPT

## 2020-04-12 PROCEDURE — 84484 ASSAY OF TROPONIN QUANT: CPT

## 2020-04-12 PROCEDURE — 71275 CT ANGIOGRAPHY CHEST: CPT

## 2020-04-12 PROCEDURE — 80053 COMPREHEN METABOLIC PANEL: CPT

## 2020-04-12 PROCEDURE — 71275 CT ANGIOGRAPHY CHEST: CPT | Mod: 26

## 2020-04-12 PROCEDURE — 99284 EMERGENCY DEPT VISIT MOD MDM: CPT | Mod: 25

## 2020-04-12 PROCEDURE — 85730 THROMBOPLASTIN TIME PARTIAL: CPT

## 2020-04-12 PROCEDURE — 99285 EMERGENCY DEPT VISIT HI MDM: CPT

## 2020-04-12 PROCEDURE — 82553 CREATINE MB FRACTION: CPT

## 2020-04-12 PROCEDURE — 85027 COMPLETE CBC AUTOMATED: CPT

## 2020-04-12 PROCEDURE — 85610 PROTHROMBIN TIME: CPT

## 2020-04-12 PROCEDURE — 82550 ASSAY OF CK (CPK): CPT

## 2020-04-12 PROCEDURE — 71045 X-RAY EXAM CHEST 1 VIEW: CPT

## 2020-04-12 PROCEDURE — 80048 BASIC METABOLIC PNL TOTAL CA: CPT

## 2020-04-12 PROCEDURE — 93005 ELECTROCARDIOGRAM TRACING: CPT

## 2020-04-12 PROCEDURE — 71045 X-RAY EXAM CHEST 1 VIEW: CPT | Mod: 26

## 2020-04-12 PROCEDURE — 84132 ASSAY OF SERUM POTASSIUM: CPT

## 2020-04-12 RX ORDER — POTASSIUM CHLORIDE 20 MEQ
10 PACKET (EA) ORAL ONCE
Refills: 0 | Status: COMPLETED | OUTPATIENT
Start: 2020-04-12 | End: 2020-04-12

## 2020-04-12 RX ORDER — ASPIRIN/CALCIUM CARB/MAGNESIUM 324 MG
162 TABLET ORAL ONCE
Refills: 0 | Status: COMPLETED | OUTPATIENT
Start: 2020-04-12 | End: 2020-04-12

## 2020-04-12 RX ORDER — POTASSIUM CHLORIDE 20 MEQ
40 PACKET (EA) ORAL ONCE
Refills: 0 | Status: COMPLETED | OUTPATIENT
Start: 2020-04-12 | End: 2020-04-12

## 2020-04-12 RX ADMIN — Medication 100 MILLIEQUIVALENT(S): at 17:46

## 2020-04-12 RX ADMIN — Medication 40 MILLIEQUIVALENT(S): at 17:46

## 2020-04-12 RX ADMIN — Medication 162 MILLIGRAM(S): at 15:49

## 2020-04-12 NOTE — ED PROVIDER NOTE - OBJECTIVE STATEMENT
54F PMH HTN presents with palpitations. Pt states for the past 2-3 weeks she has been having palpitations and intermittent Chest pain. States that she has been having chest pain several times x week. States that it occurs 54F PMH HTN presents with palpitations. Pt states for the past 2-3 weeks she has been having palpitations and intermittent Chest pain. States that she has been having chest pain several times x week. States that it L sided pressure pain reaching 6/10 intensity. Sometimes with radiation to arms, and diaphoresis. Is often provoked with exertion. Was started on metoprolol for palpitations by PMD which seemed to make her CP worse. Was seen here 2x in past few weeks with normal ER workup. Formerly smoked a few cigarettes daily. No known fam hx of MIs

## 2020-04-12 NOTE — ED PROVIDER NOTE - NS ED ROS FT
REVIEW OF SYSTEMS:  General:  no fever, no chills  HEENT: no headache, no vision changes  Cardiac: +chest pain, +palpitations  Respiratory: no cough, +shortness of breath  Gastrointestinal: no abdominal pain, no nausea, no vomiting, no diarrhea  Genitourinary: no hematuria, no dysuria, no urinary frequency  Extremities: no extremity swelling, no extremity pain  Neuro: no focal weakness, no numbness/tingling of the extremities, no decreased sensation  Heme: no easy bleeding, no easy bruising  Skin: no jaundice,  no rashes, no lesions  All other ROS as documented in HPI  -Johnathan Ely, PGY-2

## 2020-04-12 NOTE — ED PROVIDER NOTE - CLINICAL SUMMARY MEDICAL DECISION MAKING FREE TEXT BOX
Impression:  CP in adult with H&P that is concerning for ACS.  H&P at this time inconsistent with an alternative etiology such as pericarditis, myocarditis, pulmonary embolism, pneumothorax, pneumonia, zoster, or esophageal perforation.  History not abrupt in onset, tearing or ripping; pulses symmetric; no evidence of aortic dissection.  Neither CTA chest, nor ddimer indicated at this time.  Plan:  ASA, trop, CXR.  Cardiology consult.  Possible cath consult. Impression:  CP in adult with H&P that is concerning for ACS, and warrants workup.  H&P at this time inconsistent with an alternative etiology such as pericarditis, myocarditis, pneumothorax, pneumonia, zoster, or esophageal perforation.  History not abrupt in onset, tearing or ripping; pulses symmetric; no evidence of aortic dissection.  That being said, this is her 3rd ED visit for this problem and further workup with CTA is warranted.    Plan:  trop, proBNP, CTA chest, reassess.

## 2020-04-12 NOTE — ED ADULT NURSE REASSESSMENT NOTE - NS ED NURSE REASSESS COMMENT FT1
Pt. resting in bed. VSS/NAD, pt on cardiac monitor and receiving potassium through Iv as per md order.

## 2020-04-12 NOTE — ED PROVIDER NOTE - PATIENT PORTAL LINK FT
You can access the FollowMyHealth Patient Portal offered by [facility] by registering at the following website: [email or web address]. By joining [employer]’s FollowMyHealth portal, you will also be able to view your health information using other applications (apps) compatible with our system.

## 2020-04-12 NOTE — ED ADULT NURSE NOTE - OBJECTIVE STATEMENT
Pt is a 55 y/o female covid+ c/o palpitations and chest pain x3 weeks on/off. States she has been in ED 2x over past few weeks for chest pain/palpitations with normal workup, states this is worst chest pain has been. States she also had cough, fever, chills, sore throat which has subsided. States pain radiates to left side of chest with intermittent radiation down left arm. Denies N/V/D, numbness, tingling, dizziness, headache. A&Ox3. Breathing unlabored and spontaneous. Abdomen soft, nontender, nondistended. Full ROM and strength of extremities. Skin dry and intact. Safety and comfort measures provided. Pt. on cardiac monitor.

## 2020-04-12 NOTE — ED PROVIDER NOTE - NSFOLLOWUPINSTRUCTIONS_ED_ALL_ED_FT
You were seen today in the emergency room for chest pain. Although the testing done today indicates that your pain is not from an acute emergency, your pain could still represent a problem with your heart. You need to follow up with your doctor and/or a cardiologist in the next 48-72 hours. If you develop any new or worsening symptoms you need to return immediately to the emergency department. If you experience any of the following please come right back to the emergency room: chest pain that becomes much worse with walking up stairs or exercising, uncontrollable nausea and vomiting, severe chest pain that will not go away, passing out, new persistent numbness and/or weakness. If we discussed that this pain was likely due to a muscle strain or sprain you should take over the counter medications such as Tylenol. You should avoid taking medications such as ibuprofen, Motrin, Advil or other NSAIDs until you speak with your doctor about your pain.     Please follow up with Dr. Mock tomorrow as discussed.     Please follow up with your pulmonologist within the next 1-2 weeks as discussed.     Your thyroid hormone (TSH) level is still pending. You will be called with the results with follow up advice.     Please see attached information on maintaining potassium in your diet.

## 2020-04-12 NOTE — ED PROVIDER NOTE - CARE PLAN
Principal Discharge DX:	Atypical chest pain  Secondary Diagnosis:	2019 novel coronavirus disease (COVID-19)  Secondary Diagnosis:	Hypokalemia

## 2020-04-12 NOTE — CHART NOTE - NSCHARTNOTEFT_GEN_A_CORE
Cath Consult    Tonja Gamble is a 54 year old female with HTN and recent diagnosis of COVID19 (3/24) presenting with intermittent chest pain and palpitations. Symptoms have been ongoing since diagnosis of COVID19. Chest pain is described as pressure like and associated with palpitations and occasional radiation to left arm. Non-pleuritic. Non-exertional. Occurs mostly at rest and resolves spontaneously. Recently started on Metoprolol for palpitations by Dr. Brody Mock (/Cardiologist). No hx of CAD or CHF. EKG reviewed: SR, LVH and subtle LELAND V2 (similar) and V3 (more prominent) than baseline EKG 11/31/20.    Recommendations:  1. No indication for emergent LHC  2. Trend hsT and CK-MB  3. Follow-up with Dr. Brody Sanchez M.D.  Cardiology Fellow  [phone]  For all Cardiology service contact information, go to [email or web address] and use "[email or web address]" to login. Cath Consult    Tonja Gamble is a 54 year old female with HTN and recent diagnosis of COVID19 (3/24) presenting with intermittent chest pain and palpitations. Symptoms have been ongoing since diagnosis of COVID19. Chest pain is described as pressure like and associated with palpitations and occasional radiation to left arm. Non-pleuritic. Non-exertional. Occurs mostly at rest and resolves spontaneously. Recently started on Metoprolol for palpitations by Dr. Brody Mock (/Cardiologist). No hx of CAD or CHF. Recent ER admission for chest pain on 4/6 with negative CE. EKG reviewed: SR, LVH and subtle LELAND V2 (similar) and V3 (more prominent) than baseline EKG 11/31/20, no reciprocal changes. Unable to review EKG from 4/6 (not in sunrise).    Recommendations:  1. No indication for emergent LHC  2. Trend hsT and CK-MB  3. Follow-up with Dr. Brody Sanchez M.D.  Cardiology Fellow  [phone]  For all Cardiology service contact information, go to [email or web address] and use "[email or web address]" to login.

## 2020-04-12 NOTE — ED PROVIDER NOTE - ATTENDING CONTRIBUTION TO CARE
MD Billy:  patient seen and evaluated with the resident.  I was present for key portions of the History & Physical, and I agree with the Impression & Plan.  MD Billy:  53 yo F, c/o SSCP and EUCEDA.  Duration:  2wks.  Quality:  Sqeezing.  Associated Sx:  No back pain.  No weakness/numbness, no abdominal pain, & no fever/chills.   Context:  3rd ED visit for same chief complaint; 1st visit 3/31/20 HST Trop 8-->8, and was dc'd; 2nd visit 4/6/20 Trop 14--> 14 and was dc'd.  Same symptom complex today.    VS: wnl.  Physical Exam: adult F, NAD, NCAT, PERRL, EOMI, neck supple, CTA B, RRR, Abd: s/nd/nt, Ext: no edema.  Neuro:  AAOx3, moving all 4 extremities equally, normal gait.     ECG:  ST-elev in V1-V3 that is more exaggerated than on 3/31/20.   Impression:  CP in adult with H&P that is concerning for ACS.  H&P at this time inconsistent with an alternative etiology such as pericarditis, myocarditis, pulmonary embolism, pneumothorax, pneumonia, zoster, or esophageal perforation.  History not abrupt in onset, tearing or ripping; pulses symmetric; no evidence of aortic dissection.  Neither CTA chest, nor ddimer indicated at this time.  Plan:  ASA, trop, CXR.  Cardiology consult.  Possible cath consult. MD Billy:  patient seen and evaluated with the resident.  I was present for key portions of the History & Physical, and I agree with the Impression & Plan.  MD Billy:  55 yo F, c/o SSCP and EUCEDA.  Duration:  2wks.  Quality:  Sqeezing.  Associated Sx:  No back pain.  No weakness/numbness, no abdominal pain, & no fever/chills.   Context:  3rd ED visit for same chief complaint; 1st visit 3/31/20 HST Trop 8-->8, and was dc'd; 2nd visit 4/6/20 Trop 14--> 14 and was dc'd.  Same symptom complex today.    VS: wnl.  Physical Exam: adult F, NAD, NCAT, PERRL, EOMI, neck supple, CTA B, RRR, Abd: s/nd/nt, Ext: no edema.  Neuro:  AAOx3, moving all 4 extremities equally, normal gait.     ECG:  ST-elev in V1-V3 that is more exaggerated than on 3/31/20.   Impression:  CP in adult with H&P that is concerning for ACS, and warrants workupl.  H&P at this time inconsistent with an alternative etiology such as pericarditis, myocarditis, pneumothorax, pneumonia, zoster, or esophageal perforation.  History not abrupt in onset, tearing or ripping; pulses symmetric; no evidence of aortic dissection.  That being said, this is her 3rd ED visit for this problem and further workup with CTA is warranted.    Plan:  trop, proBNP, CTA chest, reassess.

## 2020-04-12 NOTE — ED PROVIDER NOTE - PROGRESS NOTE DETAILS
Cardiology called for cath consult. Active CP, palpitations, dynamic EKG changes Cardiology called for cath consult, given abnormal ECG.  Active CP, palpitations, ecg changed from prior. patient been having constant CP for 5 days,  but Trop < 6, CK MB 1, Pro BNP 23.  Repeat ECG shows no ST abnormalities - possibly due to limb placement.  CTA chest negative for PE, and shows likely COVID-related lung changes.  Only lab abnormality was hypo K, which has been repleted.  Renal function normal.  Patient endorses poor appetite, and I suspect hypoK 2/2 decreased intake.    Case, and ECGs d/w Dr. Mock, who can follow patient closely later this week.  She will call tomorrow morning to arrange telehealth follow up.  Strict return precautions. Patient signed out to me from Dr. Ely. Reexamined at bedside, endorses improvement of symptoms at this time. Patient updated on CT results. Okay with following up with Dr. Mock for cards, and her pulmonologist if repeat k+ is wnl. Cr Rodríguez, PGY1

## 2020-04-12 NOTE — ED ADULT NURSE REASSESSMENT NOTE - NS ED NURSE REASSESS COMMENT FT1
Received report from Renetta FRANCO. Patient brought to bathroom via wheelchair with assistance by ED Tech. Patient tolerated well, repeat lab work sent to lab. Patient c/o SOB which has been going on for 3 weeks since +COVID diagnosis. No c/o chest pain or palpitations at this time. VSS, patient on cardiac monitor, NSR. Safety measures maintained with bed in low position.

## 2020-04-12 NOTE — ED PROVIDER NOTE - PHYSICAL EXAMINATION
General: Well developed, well nourished  HEENT: Normocephalic and atraumatic, Trachea midline.   Cardiac: Normal S1 and S2 w/ RRR. No MRG.  Pulmonary: CTA bilaterally. No increased WOB.   Abdominal: Soft, NTND  Neurologic: No focal sensory or motor deficits.  Musculoskeletal: No limited ROM.  Vascular: Warm and well perfused  Skin: Color appropriate for race.   Psychiatric: Appropriate mood and affect. No apparent risk to self or others.  Johnathan Ely M.D. PGY-2

## 2020-04-13 NOTE — ED POST DISCHARGE NOTE - DETAILS
4/13/2020: made pt aware of borderline hemA1c and advised carb controlled diet with outpatient f/u with PMD. Patient understood above instructions -Angela Prajapati PA-C

## 2020-04-16 ENCOUNTER — EMERGENCY (EMERGENCY)
Facility: HOSPITAL | Age: 55
LOS: 1 days | Discharge: HOME | End: 2020-04-16
Attending: EMERGENCY MEDICINE
Payer: COMMERCIAL

## 2020-04-16 VITALS
WEIGHT: 160.06 LBS | DIASTOLIC BLOOD PRESSURE: 87 MMHG | HEART RATE: 100 BPM | RESPIRATION RATE: 18 BRPM | TEMPERATURE: 98 F | OXYGEN SATURATION: 100 % | HEIGHT: 66 IN | SYSTOLIC BLOOD PRESSURE: 137 MMHG

## 2020-04-16 VITALS
HEART RATE: 85 BPM | OXYGEN SATURATION: 100 % | RESPIRATION RATE: 20 BRPM | SYSTOLIC BLOOD PRESSURE: 150 MMHG | DIASTOLIC BLOOD PRESSURE: 87 MMHG | TEMPERATURE: 98 F

## 2020-04-16 DIAGNOSIS — Z98.89 OTHER SPECIFIED POSTPROCEDURAL STATES: Chronic | ICD-10-CM

## 2020-04-16 LAB
ALBUMIN SERPL ELPH-MCNC: 4.2 G/DL — SIGNIFICANT CHANGE UP (ref 3.3–5)
ALP SERPL-CCNC: 64 U/L — SIGNIFICANT CHANGE UP (ref 40–120)
ALT FLD-CCNC: 11 U/L — SIGNIFICANT CHANGE UP (ref 10–45)
ANION GAP SERPL CALC-SCNC: 18 MMOL/L — HIGH (ref 5–17)
AST SERPL-CCNC: 15 U/L — SIGNIFICANT CHANGE UP (ref 10–40)
BASOPHILS # BLD AUTO: 0.02 K/UL — SIGNIFICANT CHANGE UP (ref 0–0.2)
BASOPHILS NFR BLD AUTO: 0.3 % — SIGNIFICANT CHANGE UP (ref 0–2)
BILIRUB SERPL-MCNC: 0.4 MG/DL — SIGNIFICANT CHANGE UP (ref 0.2–1.2)
BUN SERPL-MCNC: 13 MG/DL — SIGNIFICANT CHANGE UP (ref 7–23)
CALCIUM SERPL-MCNC: 9.8 MG/DL — SIGNIFICANT CHANGE UP (ref 8.4–10.5)
CHLORIDE SERPL-SCNC: 102 MMOL/L — SIGNIFICANT CHANGE UP (ref 96–108)
CO2 SERPL-SCNC: 21 MMOL/L — LOW (ref 22–31)
CREAT SERPL-MCNC: 0.97 MG/DL — SIGNIFICANT CHANGE UP (ref 0.5–1.3)
EOSINOPHIL # BLD AUTO: 0.07 K/UL — SIGNIFICANT CHANGE UP (ref 0–0.5)
EOSINOPHIL NFR BLD AUTO: 1.1 % — SIGNIFICANT CHANGE UP (ref 0–6)
GLUCOSE SERPL-MCNC: 109 MG/DL — HIGH (ref 70–99)
HCT VFR BLD CALC: 33.9 % — LOW (ref 34.5–45)
HGB BLD-MCNC: 11.2 G/DL — LOW (ref 11.5–15.5)
IMM GRANULOCYTES NFR BLD AUTO: 0.5 % — SIGNIFICANT CHANGE UP (ref 0–1.5)
LYMPHOCYTES # BLD AUTO: 1.49 K/UL — SIGNIFICANT CHANGE UP (ref 1–3.3)
LYMPHOCYTES # BLD AUTO: 24.1 % — SIGNIFICANT CHANGE UP (ref 13–44)
MAGNESIUM SERPL-MCNC: 2 MG/DL — SIGNIFICANT CHANGE UP (ref 1.6–2.6)
MCHC RBC-ENTMCNC: 30.4 PG — SIGNIFICANT CHANGE UP (ref 27–34)
MCHC RBC-ENTMCNC: 33 GM/DL — SIGNIFICANT CHANGE UP (ref 32–36)
MCV RBC AUTO: 91.9 FL — SIGNIFICANT CHANGE UP (ref 80–100)
MONOCYTES # BLD AUTO: 0.5 K/UL — SIGNIFICANT CHANGE UP (ref 0–0.9)
MONOCYTES NFR BLD AUTO: 8.1 % — SIGNIFICANT CHANGE UP (ref 2–14)
NEUTROPHILS # BLD AUTO: 4.08 K/UL — SIGNIFICANT CHANGE UP (ref 1.8–7.4)
NEUTROPHILS NFR BLD AUTO: 65.9 % — SIGNIFICANT CHANGE UP (ref 43–77)
NRBC # BLD: 0 /100 WBCS — SIGNIFICANT CHANGE UP (ref 0–0)
PHOSPHATE SERPL-MCNC: 3 MG/DL — SIGNIFICANT CHANGE UP (ref 2.5–4.5)
PLATELET # BLD AUTO: 255 K/UL — SIGNIFICANT CHANGE UP (ref 150–400)
POTASSIUM SERPL-MCNC: 3.4 MMOL/L — LOW (ref 3.5–5.3)
POTASSIUM SERPL-SCNC: 3.4 MMOL/L — LOW (ref 3.5–5.3)
PROT SERPL-MCNC: 7.8 G/DL — SIGNIFICANT CHANGE UP (ref 6–8.3)
RBC # BLD: 3.69 M/UL — LOW (ref 3.8–5.2)
RBC # FLD: 12.4 % — SIGNIFICANT CHANGE UP (ref 10.3–14.5)
SODIUM SERPL-SCNC: 141 MMOL/L — SIGNIFICANT CHANGE UP (ref 135–145)
WBC # BLD: 6.19 K/UL — SIGNIFICANT CHANGE UP (ref 3.8–10.5)
WBC # FLD AUTO: 6.19 K/UL — SIGNIFICANT CHANGE UP (ref 3.8–10.5)

## 2020-04-16 PROCEDURE — 93010 ELECTROCARDIOGRAM REPORT: CPT

## 2020-04-16 PROCEDURE — 93005 ELECTROCARDIOGRAM TRACING: CPT

## 2020-04-16 PROCEDURE — 83735 ASSAY OF MAGNESIUM: CPT

## 2020-04-16 PROCEDURE — 85027 COMPLETE CBC AUTOMATED: CPT

## 2020-04-16 PROCEDURE — 99285 EMERGENCY DEPT VISIT HI MDM: CPT

## 2020-04-16 PROCEDURE — 99284 EMERGENCY DEPT VISIT MOD MDM: CPT | Mod: 25

## 2020-04-16 PROCEDURE — 70450 CT HEAD/BRAIN W/O DYE: CPT | Mod: 26

## 2020-04-16 PROCEDURE — 84100 ASSAY OF PHOSPHORUS: CPT

## 2020-04-16 PROCEDURE — 70450 CT HEAD/BRAIN W/O DYE: CPT

## 2020-04-16 PROCEDURE — 80053 COMPREHEN METABOLIC PANEL: CPT

## 2020-04-16 RX ORDER — POTASSIUM CHLORIDE 20 MEQ
40 PACKET (EA) ORAL ONCE
Refills: 0 | Status: COMPLETED | OUTPATIENT
Start: 2020-04-16 | End: 2020-04-16

## 2020-04-16 RX ADMIN — Medication 40 MILLIEQUIVALENT(S): at 15:00

## 2020-04-16 NOTE — ED ADULT NURSE NOTE - OBJECTIVE STATEMENT
54 year old female 54 year old female presenting to ED c/o left-sided numbness, and intermittent chest palpitations. PMH includes HTN and hernia. COVID + test results 3/23. Pt A+Ox3 reports feeling left-sided numbness and weakness beginning last night, and has had 3 ED visits in the past 2 weeks for palpitations. Pt reports intermittent SOB, and dizziness/lightheadedness, worse when laying down and with exacerbation. Pt presents strong in all four extremities, bilateral equal pupils, face symmetrical, and epigastric tenderness to palpation. Pt states change in sensation to left side, stating "it feels prickly". Pt denies taking medication for her symptoms. Denies change in vision, N/V, urinary symptoms, changes in bowel patterns. VSS. Please see neuro flowsheet in paper chart.

## 2020-04-16 NOTE — ED PROVIDER NOTE - NSFOLLOWUPINSTRUCTIONS_ED_ALL_ED_FT
Thank you for visiting our Emergency Department, it has been a pleasure taking part in your healthcare.    Your discharge diagnosis is: numbness and tingling / palpitations   Please take all discharge medications as indicated below:  Please continue all medications as rx'd by your PMD.  Please follow up with your PMD within x48 hours.  Please follow up with your neurologist within x48 hours.  Please follow up with Cardiology within x48 hours.  Please follow up with Neuro and Cardiology for MRI and evaluation for Holter/cardiac event monitor for palpitations.   A list of providers for follow up has been given to you.  A copy of resulted labs, imaging, and findings have been provided to you.   You have had a detailed discussion with your provider regarding your diagnosis, care management and discharge planning including, but not limited to: return precautions, follow up visits with existing or new providers, new prescriptions and/or medication changes, wound and/or splint/cast care or other care   aspects specific to your diagnosis and treatment. You have been given the opportunity to have your questions answered. At this time you have been deemed stable and fit for discharge.  Return precautions to the Emergency Department include but are not limited to: unrelenting nausea, vomiting, fever, chills, chest pain, shortness of breath, dizziness, chest or abdominal pain, worsening back pain, syncope, blood in urine or stool, headache that doesn't resolve, numbness or tingling, loss of sensation, loss of motor function, or any other concerning symptoms.

## 2020-04-16 NOTE — CONSULT NOTE ADULT - SUBJECTIVE AND OBJECTIVE BOX
As per chart:   54F hx of HTN represents to ED with a cc of intermittent episodes of palpitations, now a/w diffuse LUE and LE weakness and numbness over last x24 hours, improved since last night has had x3 prior visits to ED in last x2 weeks, was eval'd for chest palpitations including cardiac biomarkers, cta negative, reports has had palpitations prior to dx of COVID 3/23, however has not been able to get outpt echo 2/2  covid restrictions. Reports poorer PO intake over last few weeks 2/2 covid. Is able to ambulate without difficulty, no focal weakness. No urinary or bowel complaints. Denies n/v/f/c/cp/sob. Denies headache, syncope, lightheadedness, dizziness. Denies abdominal pain. Denies dysuria, hematuria, hematochezia, BRBPR, tarry stools, diarrhea, constipation. TSH normal at prior visit.    ROS: as above    PAST MEDICAL & SURGICAL HISTORY:  Hernia: umblical  Leiomyoma: of uterus  Hypertension  Born by normal vaginal delivery: 2 vaginal deliveries  History of lumpectomy of both breasts: pt reports multiple biopsies and lumpectomy of both breasts  H/O myomectomy: 2007 for fibroid removal    FAMILY HISTORY:    SOCIAL HISTORY:   T/E/D:   Occupation:   Lives with:     MEDICATIONS (HOME):  Home Medications:  acetaminophen 325 mg oral tablet: 2 tab(s) orally every 6 hours (21 Apr 2016 07:48)  ammonium lactate topical lotion: Apply topically to affected area  (11 Apr 2016 15:52)  fluocinolone acetonide: Apply topically to affected area once a day (at bedtime) (11 Apr 2016 15:52)  hydrocortisone  cream 0.2%: Apply topically to affected area 2 times a day (11 Apr 2016 15:52)  ibuprofen 600 mg oral tablet: 1 tab(s) orally every 6 hours (21 Apr 2016 07:48)  Lotrel 10 mg-20 mg oral capsule: 1 cap(s) orally once a day (11 Apr 2016 15:52)  nasocort nasal spray: 1 spray(s) nasal once a day (11 Apr 2016 15:52)    MEDICATIONS  (STANDING):  potassium chloride    Tablet ER 40 milliEquivalent(s) Oral once    MEDICATIONS  (PRN):    ALLERGIES/INTOLERANCES:  Allergies  Dog and Cat dander ---rhinitis (Other)  dust (Rhinitis)  No Known Drug Allergies    Intolerances    VITALS & EXAMINATION:  Vital Signs Last 24 Hrs  T(C): 36.8 (16 Apr 2020 11:46), Max: 36.8 (16 Apr 2020 11:46)  T(F): 98.3 (16 Apr 2020 11:46), Max: 98.3 (16 Apr 2020 11:46)  HR: 77 (16 Apr 2020 14:35) (77 - 100)  BP: 136/83 (16 Apr 2020 14:35) (136/83 - 137/87)  BP(mean): --  RR: 18 (16 Apr 2020 14:35) (18 - 18)  SpO2: 100% (16 Apr 2020 14:35) (100% - 100%)    PE:  Exam deferred at this time    LABORATORY:  CBC                       11.2   6.19  )-----------( 255      ( 16 Apr 2020 14:20 )             33.9     Chem 04-16    141  |  102  |  13  ----------------------------<  109<H>  3.4<L>   |  21<L>  |  0.97    Ca    9.8      16 Apr 2020 14:20  Phos  3.0     04-16  Mg     2.0     04-16    TPro  7.8  /  Alb  4.2  /  TBili  0.4  /  DBili  x   /  AST  15  /  ALT  11  /  AlkPhos  64  04-16    LFTs LIVER FUNCTIONS - ( 16 Apr 2020 14:20 )  Alb: 4.2 g/dL / Pro: 7.8 g/dL / ALK PHOS: 64 U/L / ALT: 11 U/L / AST: 15 U/L / GGT: x           Coagulopathy   Lipid Panel   A1c 04-12 [id number] 5.8    Cardiac enzymes     U/A   CSF  Immunological  Other    STUDIES & IMAGING:  Studies (EKG, EEG, EMG, etc):     Radiology (XR, CT, MR, U/S, TTE/RERE):

## 2020-04-16 NOTE — ED PROVIDER NOTE - ATTENDING CONTRIBUTION TO CARE
Attending MD Lee:  I personally have seen and examined this patient.  Resident note reviewed and agree on plan of care and except where noted.  See HPI, PE, and MDM for details.       54F presenting for complaint of acute on chronic palpitations (seen in this ED 4 days for this complaint without obvious etiology identified) but now left Attending MD Lee:  I personally have seen and examined this patient.  Resident note reviewed and agree on plan of care and except where noted.  See HPI, PE, and MDM for details.       54F presenting for complaint of acute on chronic palpitations (seen in this ED 4 days for this complaint without obvious etiology identified) but now c/o left sided numbness, no motor deficits on exam, no obvious sensory deficits. Unclear etiology but consider small vessel CVA given lateralizing neurologic symptoms, will obtain screening CT head and discuss with neuro. ECG remains without interval abnormalities or suspicious for underlying malignant dysrhythmia, will maintain on telemetry in ED

## 2020-04-16 NOTE — ED ADULT NURSE NOTE - NSIMPLEMENTINTERV_GEN_ALL_ED
Implemented All Universal Safety Interventions:  [place] to call system. Call bell, personal items and telephone within reach. Instruct patient to call for assistance. Room bathroom lighting operational. Non-slip footwear when patient is off stretcher. Physically safe environment: no spills, clutter or unnecessary equipment. Stretcher in lowest position, wheels locked, appropriate side rails in place.

## 2020-04-16 NOTE — ED PROVIDER NOTE - PATIENT PORTAL LINK FT
You can access the FollowMyHealth Patient Portal offered by [facility] by registering at the following website: [email or web address]. By joining [employer]’s FollowMyHealth portal, you will also be able to view your health information using other applications (apps) compatible with our system.

## 2020-04-16 NOTE — ED PROVIDER NOTE - CLINICAL SUMMARY MEDICAL DECISION MAKING FREE TEXT BOX
Ashley PGY3: 54F hx of HTN represents to ED with a cc of intermittent episodes of palpitations, now a/w diffuse L UE and LE weakness and numbness over last x24 hours,  exam vss non toxic appearing, non focal neuro exam, low c/f acute cardiac event given multiple visits w/ neg workup, will eval for intracranial process consider tia vs mimic, will check labs, cth, discuss with neuro, reassess thereafter.

## 2020-04-16 NOTE — ED ADULT TRIAGE NOTE - CHIEF COMPLAINT QUOTE
pt is positive COVID 3/22  C/P  Left sided  numbness head to toe since last night   intermittent  palpitation   X  3 weeks

## 2020-04-16 NOTE — ED ADULT NURSE REASSESSMENT NOTE - NS ED NURSE REASSESS COMMENT FT1
Pt cleared for d/c by MD Ashley for cardiology and neurology outpatient follow up care. D/c instructions reviewed with pt A+Ox3, VSS, IV removed.

## 2020-04-16 NOTE — ED PROVIDER NOTE - PROGRESS NOTE DETAILS
Ashley PGY3: discussed with neuro, neuro to review imaging, based on sx per neuro would hold asa at this time, recommends to pursue outpt MRI for further eval. Pt assessed at beside. Pt resting comfortably, pain controlled, pt questions answered. Vital signs stable. Has had no event on tele monitoring in ED, will instruct pt to follow up with outpt cards for possible holter/event monitoring 2/2 prolonged palpitations. discussed with pt agrees with plan for dc.

## 2020-04-16 NOTE — ED PROVIDER NOTE - OBJECTIVE STATEMENT
54F hx of HTN represents to ED with a cc of intermittent episodes of palpitations, now a/w diffuse L UE and LE weakness and numbness over last x24 hours, improved since last night has had x3 prior visits to ED in last x2 weeks, was eval'd for chest palpitations including cardiac biomarkers, cta negative, reports has had palpitations prior to dx of COVID 3/23, however has not been able to get outpt echo 2/2  covid restrictions. Reports poorer PO intake over last few weeks 2/2 covid. Is able to ambulate without difficulty, no focal weakness. No urinary or bowel complaints. Denies n/v/f/c/cp/sob. Denies headache, syncope, lightheadedness, dizziness. Denies abdominal pain. Denies dysuria, hematuria, hematochezia, BRBPR, tarry stools, diarrhea, constipation. TSH normal at prior visit.

## 2020-04-16 NOTE — ED PROVIDER NOTE - NSFOLLOWUPCLINICS_GEN_ALL_ED_FT
[facility]  Cardiology  [address] [place] [address]  Phone: [phone]  Fax:     [facility]  Neurology  [address] [address]  Phone: [phone]  Fax:   Follow Up Time:

## 2020-04-16 NOTE — CONSULT NOTE ADULT - ASSESSMENT
54F hx of HTN represents to ED with a cc of intermittent episodes of palpitations, now a/w diffuse LUE and LE weakness and numbness over last x24 hours, improved now. CT head w/ no acute intracranial findings. Events of uncertain etiology, in theory possible to TIA but not certain at this time.     Recs:  Follow up w/ Dr. William Pérez([phone]) outpatient for further neurologic workup

## 2020-04-17 ENCOUNTER — APPOINTMENT (OUTPATIENT)
Dept: PULMONOLOGY | Facility: CLINIC | Age: 55
End: 2020-04-17
Payer: COMMERCIAL

## 2020-04-17 DIAGNOSIS — R20.2 ANESTHESIA OF SKIN: ICD-10-CM

## 2020-04-17 DIAGNOSIS — R20.0 ANESTHESIA OF SKIN: ICD-10-CM

## 2020-04-17 PROCEDURE — 99214 OFFICE O/P EST MOD 30 MIN: CPT | Mod: 95

## 2020-04-17 NOTE — DISCUSSION/SUMMARY
[FreeTextEntry1] : COVID positive-positive test on March 30, 2020\par COVID pneumonia\par Palpitations rule out COVID related viral myocarditis/pericarditis\par Numbness tingling with a normal CT of the head rule out COVID related neurologic symptomatology\par Obstructive airway disease with mild shortness of breath\par We will add long-acting bronchodilator controller therapy with inhaled steroids-Advair 1 15–21 2 puffs twice daily with instructions to rinse\par Maintain a diet higher in potassium\par Neurology and cardiology follow-up\par Telemedicine visit 1 week\par Will need office appointment when able to do PFTs and follow-up chest x-ray\par Slowly recovering from COVID\par \par Very elevated IgE level with significant positive food and asthma Rast testing\par Associated eosinophilia\par Consistent with Atopy\par Aerated Flovent 110 MCG 2 puffs twice daily with instructions to rinse and pro-air for rescue therapy\par Recheck CBC and sedimentation rate\par Allergy evaluation recommended with Mitchell Boxer\par Also benefit from a workup to exclude monoclonal gammopathy\par Is no evidence to suggest parasitic type of infections without any significant or high risk travel\par  \par \par

## 2020-04-17 NOTE — PROCEDURE
[FreeTextEntry1] : Hospital data review additional\par CT head April 16, 2024 left-sided numbness and weakness\par No acute intracranial findings\par No acute infarct hemorrhage mass lesions ventricles are normal in size no fluid collections April 12 CT angiogram\par Patchy peripheral groundglass opacities upper lobes lower lobes and right middle lobe compatible with COVID pneumonia\par Serum potassium from hospital visit April 16 3.4 was given oral potassium supplementation as well as IV\par EKG normal sinus rhythm no acute changes LVH versus normal variant\par \par Hospital data\par Chest x-ray March 31, 2020\par Borderline cardiomegaly\par Clear lungs\par No parenchymal infiltrates pleural effusions or dominant pulmonary nodules\par Official report clear lung\par \par Coronavirus 19 PCR positive\par Troponin negative\par CBC mild leukocytosis with white blood count 3.46\par Hematocrit 35.9\par Serum potassium 3.3\par Renal function normal\par \par EKG normal sinus rhythm voltage criteria for LVH versus normal variant no acute changes

## 2020-04-17 NOTE — HISTORY OF PRESENT ILLNESS
[Home] : at home, [unfilled] , at the time of the visit. [Medical Office: ([facility])___] : at the medical office located in  [Patient] : the patient [TextBox_4] : This visit was provided via telehealth using real-time 2-way audio visual technology. The patient, KRISTOPHER RAMIREZ , was located at home, [address] [facility] [place] [address]  at the time of the visit.\par The provider, Dr. Raghav Morales, was located at office [address] [place] [address] at the time of the visit. \par \par  The patient, Ms. KRISTOPHER RAMIREZ  and Physician Raghav Morales  DO [facility] participated in the telehealth encounter.\par \par Verbal consent obtained by  from patient\par \par Kovic positive\par COVID mild viral pneumonia\par Has other symptoms including palpitations had another ER visit with an unremarkable cardiogram\par No echocardiogram was done\par Is tentatively scheduled with cardiology evaluation she believes in early June\par Developed numbness tingling symptomatology which is now better she underwent a CT of the head at the emergency department [facility] did not demonstrate any acute findings\par They did recommend neurologic follow-up\par No fevers chills or sweats\par Still with mild shortness of breath but no wheeze\par COVID diagnosis March 30

## 2020-04-17 NOTE — PHYSICAL EXAM
[No Acute Distress] : no acute distress [No Resp Distress] : no resp distress [TextBox_54] : Feeling pulse states not increased or rapid or irregular [TextBox_68] : To speak in full sentences without cough or identified increased respiratory rate/shortness of breath

## 2020-04-20 ENCOUNTER — APPOINTMENT (OUTPATIENT)
Dept: PULMONOLOGY | Facility: CLINIC | Age: 55
End: 2020-04-20
Payer: COMMERCIAL

## 2020-04-20 ENCOUNTER — EMERGENCY (EMERGENCY)
Facility: HOSPITAL | Age: 55
LOS: 1 days | Discharge: HOME | End: 2020-04-20
Attending: EMERGENCY MEDICINE
Payer: COMMERCIAL

## 2020-04-20 ENCOUNTER — APPOINTMENT (OUTPATIENT)
Dept: PULMONOLOGY | Facility: CLINIC | Age: 55
End: 2020-04-20

## 2020-04-20 VITALS
DIASTOLIC BLOOD PRESSURE: 102 MMHG | HEIGHT: 66 IN | OXYGEN SATURATION: 100 % | RESPIRATION RATE: 20 BRPM | HEART RATE: 103 BPM | SYSTOLIC BLOOD PRESSURE: 166 MMHG | TEMPERATURE: 98 F | WEIGHT: 164.91 LBS

## 2020-04-20 DIAGNOSIS — Z98.89 OTHER SPECIFIED POSTPROCEDURAL STATES: Chronic | ICD-10-CM

## 2020-04-20 LAB
ALBUMIN SERPL ELPH-MCNC: 4.9 G/DL — SIGNIFICANT CHANGE UP (ref 3.3–5)
ALP SERPL-CCNC: 63 U/L — SIGNIFICANT CHANGE UP (ref 40–120)
ALT FLD-CCNC: 19 U/L — SIGNIFICANT CHANGE UP (ref 10–45)
ANION GAP SERPL CALC-SCNC: 19 MMOL/L — HIGH (ref 5–17)
AST SERPL-CCNC: 19 U/L — SIGNIFICANT CHANGE UP (ref 10–40)
BASOPHILS # BLD AUTO: 0.02 K/UL — SIGNIFICANT CHANGE UP (ref 0–0.2)
BASOPHILS NFR BLD AUTO: 0.4 % — SIGNIFICANT CHANGE UP (ref 0–2)
BILIRUB SERPL-MCNC: 0.4 MG/DL — SIGNIFICANT CHANGE UP (ref 0.2–1.2)
BUN SERPL-MCNC: 12 MG/DL — SIGNIFICANT CHANGE UP (ref 7–23)
CALCIUM SERPL-MCNC: 10.4 MG/DL — SIGNIFICANT CHANGE UP (ref 8.4–10.5)
CHLORIDE SERPL-SCNC: 103 MMOL/L — SIGNIFICANT CHANGE UP (ref 96–108)
CO2 SERPL-SCNC: 21 MMOL/L — LOW (ref 22–31)
CREAT SERPL-MCNC: 1.09 MG/DL — SIGNIFICANT CHANGE UP (ref 0.5–1.3)
EOSINOPHIL # BLD AUTO: 0.06 K/UL — SIGNIFICANT CHANGE UP (ref 0–0.5)
EOSINOPHIL NFR BLD AUTO: 1.1 % — SIGNIFICANT CHANGE UP (ref 0–6)
GLUCOSE SERPL-MCNC: 94 MG/DL — SIGNIFICANT CHANGE UP (ref 70–99)
HCT VFR BLD CALC: 33.3 % — LOW (ref 34.5–45)
HGB BLD-MCNC: 10.8 G/DL — LOW (ref 11.5–15.5)
IMM GRANULOCYTES NFR BLD AUTO: 0.2 % — SIGNIFICANT CHANGE UP (ref 0–1.5)
LYMPHOCYTES # BLD AUTO: 1.98 K/UL — SIGNIFICANT CHANGE UP (ref 1–3.3)
LYMPHOCYTES # BLD AUTO: 37.8 % — SIGNIFICANT CHANGE UP (ref 13–44)
MCHC RBC-ENTMCNC: 30.4 PG — SIGNIFICANT CHANGE UP (ref 27–34)
MCHC RBC-ENTMCNC: 32.4 GM/DL — SIGNIFICANT CHANGE UP (ref 32–36)
MCV RBC AUTO: 93.8 FL — SIGNIFICANT CHANGE UP (ref 80–100)
MONOCYTES # BLD AUTO: 0.61 K/UL — SIGNIFICANT CHANGE UP (ref 0–0.9)
MONOCYTES NFR BLD AUTO: 11.6 % — SIGNIFICANT CHANGE UP (ref 2–14)
NEUTROPHILS # BLD AUTO: 2.56 K/UL — SIGNIFICANT CHANGE UP (ref 1.8–7.4)
NEUTROPHILS NFR BLD AUTO: 48.9 % — SIGNIFICANT CHANGE UP (ref 43–77)
NRBC # BLD: 0 /100 WBCS — SIGNIFICANT CHANGE UP (ref 0–0)
PLATELET # BLD AUTO: 230 K/UL — SIGNIFICANT CHANGE UP (ref 150–400)
POTASSIUM SERPL-MCNC: 3.2 MMOL/L — LOW (ref 3.5–5.3)
POTASSIUM SERPL-SCNC: 3.2 MMOL/L — LOW (ref 3.5–5.3)
PROT SERPL-MCNC: 8.5 G/DL — HIGH (ref 6–8.3)
RBC # BLD: 3.55 M/UL — LOW (ref 3.8–5.2)
RBC # FLD: 13.2 % — SIGNIFICANT CHANGE UP (ref 10.3–14.5)
SODIUM SERPL-SCNC: 143 MMOL/L — SIGNIFICANT CHANGE UP (ref 135–145)
TROPONIN T, HIGH SENSITIVITY RESULT: <6 NG/L — SIGNIFICANT CHANGE UP (ref 0–51)
WBC # BLD: 5.24 K/UL — SIGNIFICANT CHANGE UP (ref 3.8–10.5)
WBC # FLD AUTO: 5.24 K/UL — SIGNIFICANT CHANGE UP (ref 3.8–10.5)

## 2020-04-20 PROCEDURE — 99218: CPT

## 2020-04-20 PROCEDURE — 71045 X-RAY EXAM CHEST 1 VIEW: CPT | Mod: 26

## 2020-04-20 PROCEDURE — 93010 ELECTROCARDIOGRAM REPORT: CPT

## 2020-04-20 PROCEDURE — 99213 OFFICE O/P EST LOW 20 MIN: CPT | Mod: 95

## 2020-04-20 RX ORDER — ACETAMINOPHEN 500 MG
650 TABLET ORAL ONCE
Refills: 0 | Status: COMPLETED | OUTPATIENT
Start: 2020-04-20 | End: 2020-04-20

## 2020-04-20 RX ORDER — ALBUTEROL 90 UG/1
2 AEROSOL, METERED ORAL EVERY 4 HOURS
Refills: 0 | Status: DISCONTINUED | OUTPATIENT
Start: 2020-04-20 | End: 2020-04-24

## 2020-04-20 RX ORDER — SODIUM CHLORIDE 9 MG/ML
3 INJECTION INTRAMUSCULAR; INTRAVENOUS; SUBCUTANEOUS EVERY 8 HOURS
Refills: 0 | Status: DISCONTINUED | OUTPATIENT
Start: 2020-04-20 | End: 2020-04-24

## 2020-04-20 RX ORDER — POTASSIUM CHLORIDE 20 MEQ
40 PACKET (EA) ORAL ONCE
Refills: 0 | Status: COMPLETED | OUTPATIENT
Start: 2020-04-20 | End: 2020-04-20

## 2020-04-20 RX ADMIN — Medication 650 MILLIGRAM(S): at 20:53

## 2020-04-20 RX ADMIN — Medication 40 MILLIEQUIVALENT(S): at 21:42

## 2020-04-20 NOTE — ED CDU PROVIDER INITIAL DAY NOTE - PROGRESS NOTE DETAILS
CDU PROGRESS NOTE PA MARIO: Pt resting comfortably, without complaint. NAD, VSS. Sp02 96-99% on RA, Lungs CTAB. No events on telemetry. Will closely monitor.

## 2020-04-20 NOTE — ED CDU PROVIDER INITIAL DAY NOTE - PHYSICAL EXAMINATION
GEN: Well appearing, well nourished, in no apparent distress.  HEAD: NCAT  HEENT: PERRL, Airway patent, EOMI, non-erythematous pharynx, no exudates, uvula midline, MMM, neck supple, no LAD, no JVD  LUNG: CTAB, no adventitious sounds, no retractions, no nasal flaring, speaking in  full sentences  CV: RRR, no murmurs, reproducible Chest wall TTP  Abd: soft, NTND, no rebound or guarding, BS+ in all quadrants, no CVAT  MSK: WWP, Pulses 2+ in extremities, No edema   Neuro:  AAOx3, Ambulatory with stable gait.  Skin: Warm and dry, no evidence of rash

## 2020-04-20 NOTE — ED PROVIDER NOTE - PHYSICAL EXAMINATION
GEN: Well appearing, well nourished, in no apparent distress.  HEAD: NCAT  HEENT: PERRL, Airway patent, EOMI, non-erythematous pharynx, no exudates, uvula midline, MMM, neck supple, no LAD, no JVD  LUNG: CTAB, no adventitious sounds, no retractions, no nasal flaring, speaking in  full sentences  CV: RRR, no murmurs, reproducible Chest wall TTP  Abd: soft, NTND, no rebound or guarding, BS+ in all quadrants, no CVAT  MSK: WWP, Pulses 2+ in extremities, No edema   Neuro:  AAOx3, Ambulatory with stable gait.  Skin: Warm and dry, no evidence of rash  Psych: normal mood and affect

## 2020-04-20 NOTE — ED PROVIDER NOTE - NS ED ROS FT
Constitutional: no fevers, no chills.  Eyes: no visual changes.  Ears: no ear drainage, no ear pain.  Nose: no nasal congestion.  Mouth/Throat: no sore throat.  Cardiovascular: +chest pain.  Respiratory: +shortness of breath, no wheezing, +cough  Gastrointestinal: no nausea, no vomiting, no diarrhea, no abdominal pain.  MSK: no flank pain, no back pain.  Genitourinary: no dysuria, no hematuria.  Skin: no rashes.  Neuro: no headache

## 2020-04-20 NOTE — ED PROVIDER NOTE - OBJECTIVE STATEMENT
54F hx htn, hld presents with ongoing sob x 5 weeks, diagnosed with covid 4 weeks ago. Today complaining of non productive cough since yesterday without fevers and chest pain, mid chest especially when she coughs. chest pain has been ongoing since 4/12 and had neg CTA for PE but + for covid at that time. chest pain is non pleuritic, non radiating, improved with aspirin. Patient denies abd pain, N/V/D/C, weakness, HA, dizziness, urinary symptoms, extremity pain or swelling or other complaints.

## 2020-04-20 NOTE — ED CDU PROVIDER DISPOSITION NOTE - CLINICAL COURSE
54F hx htn, hld presents with ongoing sob x 5 weeks, diagnosed with covid 4 weeks ago. Today complaining of non productive cough since yesterday without fevers and chest pain, mid chest especially when she coughs. chest pain has been ongoing since 4/12 and had neg CTA for PE but + for covid at that time. chest pain is non pleuritic, non radiating, improved with aspirin. Patient denies abd pain, N/V/D/C, weakness, HA, dizziness, urinary symptoms, extremity pain or swelling or other complaints.  In ED, patient had ekg NSR 84bpm, no signs of acute ischemia, QTc 437ms, troponin <6, chest x ray clear lungs. Resting pulse ox 97-99% and ambulatory pulse ox unchanged. No tachypnea, no labored breathing or signs of respiratory distress. Pt sent to CDU for frequent reeval, vitals q 4hrs, pulse ox monitor q 4hrs, Tele monitor, TTE, and observation. 54F hx htn, hld presents with ongoing sob x 5 weeks, diagnosed with covid 4 weeks ago. Today complaining of non productive cough since yesterday without fevers and chest pain, mid chest especially when she coughs. chest pain has been ongoing since 4/12 and had neg CTA for PE but + for covid at that time. chest pain is non pleuritic, non radiating, improved with aspirin. Patient denies abd pain, N/V/D/C, weakness, HA, dizziness, urinary symptoms, extremity pain or swelling or other complaints.  In ED, patient had ekg NSR 84bpm, no signs of acute ischemia, QTc 437ms, troponin <6, chest x ray clear lungs. Resting pulse ox 97-99% and ambulatory pulse ox unchanged. No tachypnea, no labored breathing or signs of respiratory distress. Pt sent to CDU for frequent reeval, vitals q 4hrs, pulse ox monitor q 4hrs, Tele monitor, and observation. Patient remained stable while in CDU with no events noted over tele and SpO2 >97% on room air. Patient continued to be symptomatic with SOB and CP. discussed case with Dr. Mock who recommended D-dimer and if positive then CTA chest. CTA negative for PE however showed GGO likely COVID. ED attending Dr. Fair recommended discharged home with outpatient f.u with cardiologist Dr. Mock

## 2020-04-20 NOTE — ED PROVIDER NOTE - CLINICAL SUMMARY MEDICAL DECISION MAKING FREE TEXT BOX
54F hx htn, hld presents with ongoing sob x 5 weeks, diagnosed with covid 4 weeks ago. Today complaining of non productive cough since yesterday without fevers and chest pain, mid chest especially when she coughs. chest pain has been ongoing since 4/12 and had neg CTA for PE. Plan: Cardiac Monitor, EKG, Labs/cardiac enzymes, ASA, CXR and admit to telemetry for further workup. This is all likely related to her COVID illness.

## 2020-04-20 NOTE — ED CDU PROVIDER DISPOSITION NOTE - NSFOLLOWUPINSTRUCTIONS_ED_ALL_ED_FT
Follow up with your cardiologist in 2-3 days, or call [phone] and arrange a follow up with our clinic, state you were seen in the emergency department and would like a rapid appointment.  - Return to the Emergency Room for any new or worsening symptoms.  - Please take Tylenol 650mg every 6 hours as needed for pain.     Please read the information packet provided to you carefully.    You should treat fevers by taking Tylenol as directed as needed.  You should stay hydrated and increase the amount of fluid you drink.  YOU SHOULD SELF-QUARANTINE FOR 14 DAYS - PARTICULARLY AVOID THE ELDERLY, ILL AND IMMUNOCOMPROMISED - TO AVOID POTENTIAL SPREAD OF THE CORONAVIRUS.   You should monitor your temperatures, and return to the Emergency Department if your shortness of breath becomes worse, you become weak or new, concerning symptoms develop.     What is a coronavirus?  Coronaviruses are a large family of viruses that cause illnesses ranging from the common cold to more severe diseases such as Middle East Respiratory Syndrome (MERS) and Severe Acute Respiratory Syndrome (SARS).    What is Novel Coronavirus (COVID-19)?  The Centers for Disease Control and Prevention (CDC) is closely monitoring the outbreak caused by COVID-19. For the latest information about COVID-19, visit the CDC website at CDC.gov/Coronavirus    How are coronaviruses spread?  Coronaviruses can be transmitted from person to person, usually after close contact with an infected  person (for example, in a household, workplace, or healthcare setting), via droplets that become airborne after a cough or sneeze. These droplets can then infect a nearby person. Transmission can also occur by touching recently contaminated surfaces.    Is there a treatment for a COVID-19?  There is no specific treatment for disease caused by COVID-19. However, many of the symptoms can be treated based on the patient’s clinical condition. Supportive care for infected persons can be highly effective.    What are the symptoms of coronavirus infection?  It depends on the virus, but common signs include fever and/or respiratory symptoms such as cough and shortness of breath. In more severe cases, infection can cause pneumonia, severe acute respiratory syndrome, kidney failure and even death. Fortunately, most cases of COVID-19 have an illness no different than the influenza (flu), with a majority of these patients having mild symptoms and overall mortality which appears to be not much different than the flu.    What can I do to protect myself?  The best precautionary measures:  – washing your hands  – covering your cough  – disinfecting surfaces  – it is also advisable to avoid close contact with anyone showing symptoms of respiratory illness such as coughing and sneezing  – those with symptoms should wear a surgical mask when around others    What can I do to protect those around me?  If you have been identified as someone who may be infected with COVID-19, we recommend you follow the self-isolation procedures outlined on the following page to protect those around you and to limit the spread of this virus.    We recommend the below precautionary steps from now until 14 days from when you returned from your travel or date of your last known possible contact:  — Do not go to work, school or public areas. Avoid using public transportation, ridesharing or taxis.  — As much as possible, separate yourself from other people in your home. If you can, you should stay in a room and away from other people. Also, you should use a separate bathroom if available.  — Wear the supplied mask whenever you are around other people.  — If you have a non-urgent medical appointment, please reschedule for a later date. If the appointment is urgent, please call the health care provider and tell them that you are on self-isolation for possible COVID-19. This will help the health care provider’s office take steps to keep other people from getting infected or exposed. If you can reschedule routine appointments, do so.  — Wash your hands often with soap and water for at least 15 to 20 seconds or clean your hands with an alcohol-based hand  that contains 60 to 95% alcohol, covering all surfaces of your hands and rubbing them together until they feel dry. Soap and water should be used preferentially if hands are visibly dirty.  — Cover your mouth and nose with a tissue when you cough or sneeze. Throw used tissues in a lined trash can. Immediately wash your hands.  — Avoid touching your eyes, nose, and mouth with your hands.  — Avoid sharing personal household items. You should not share dishes, drinking glasses, cups, eating utensils, towels, or bedding with other people or pets in your home. After using these items, they should be washed thoroughly with soap and water.  — Clean and disinfect all “high-touch” surfaces every day. High touch surfaces include counters, tabletops, doorknobs, light switches, remote controls, bathroom fixtures, toilets, phones, keyboards, tablets, and bedside tables. Also, clean any surfaces that may have blood, stool, or body fluids on them.

## 2020-04-20 NOTE — ED CDU PROVIDER INITIAL DAY NOTE - OBJECTIVE STATEMENT
54F hx htn, hld presents with ongoing sob x 5 weeks, diagnosed with covid 4 weeks ago. Today complaining of non productive cough since yesterday without fevers and chest pain, mid chest especially when she coughs. chest pain has been ongoing since 4/12 and had neg CTA for PE but + for covid at that time. chest pain is non pleuritic, non radiating, improved with aspirin. Patient denies abd pain, N/V/D/C, weakness, HA, dizziness, urinary symptoms, extremity pain or swelling or other complaints.  In ED, patient had ekg NSR 84bpm, no signs of acute ischemia, QTc 437ms, troponin <6, chest x ray clear lungs. Resting pulse ox 97-99% and ambulatory pulse ox unchanged. No tachypnea, no labored breathing or signs of respiratory distress. Pt sent to CDU for frequent reeval, vitals q 4hrs, pulse ox monitor q 4hrs, Tele monitor, TTE, and observation. 54F hx htn, hld presents with ongoing sob x 5 weeks, diagnosed with covid 4 weeks ago. Today complaining of non productive cough since yesterday without fevers and chest pain, mid chest especially when she coughs. chest pain has been ongoing since 4/12 and had neg CTA for PE but + for covid at that time. chest pain is non pleuritic, non radiating, improved with aspirin. Patient denies abd pain, N/V/D/C, weakness, HA, dizziness, urinary symptoms, extremity pain or swelling or other complaints. Cardiologist Brody Truong  In ED, patient had ekg NSR 84bpm, no signs of acute ischemia, QTc 437ms, troponin <6, chest x ray clear lungs. Resting pulse ox 97-99% and ambulatory pulse ox unchanged. No tachypnea, no labored breathing or signs of respiratory distress. Pt sent to CDU for frequent reeval, vitals q 4hrs, pulse ox monitor q 4hrs, Tele monitor, TTE, and observation.

## 2020-04-20 NOTE — ED CDU PROVIDER INITIAL DAY NOTE - ATTENDING CONTRIBUTION TO CARE
Nemes - 53yo F w HTN, HLD p/w SOB x 5 weeks, diagnosed with covid 4 weeks ago. Today complaining of non productive cough since yesterday without fevers and chest pain, mid chest especially when she coughs. Chest pain has been ongoing since 4/12 and had neg CTA for PE but + for covid at that time. seen pulmonary, has appointment w Cards in June for Echo. Mild tachycardia but otherwise VS wnl, well appearing, in NAD, lungs clear, cardiac wnl, no JVD. Moist mucosae, pink conjunctivae. Abdomen soft/NT, no CVAT. No pedal edema, no calf TTP. Cardiac w/u, CXR negative. Will observe in CDU overnight for Echo in the am. Stx likely 2/2 Covid infection

## 2020-04-20 NOTE — ED ADULT NURSE REASSESSMENT NOTE - NS ED NURSE REASSESS COMMENT FT1
Pt arrives with worsening SOB, cough and palipitations. Reports +covid test over 1 mo ago was improving until a few days  ago. Lined and labbed, moved to private room for EKG and bedside cardiac monitoring, will follow........................................................SALVADOR PEDRAZA

## 2020-04-20 NOTE — ED CDU PROVIDER INITIAL DAY NOTE - DETAILS
54F hx htn, hld presents with ongoing sob, chest pain and palpitations x 5 weeks, + covid 4 weeks ago.   Plan  frequent reeval, vitals q 4hrs, pulse ox monitor q 4hrs, Tele monitor, TTE, and observation.  c/d/w Attending

## 2020-04-20 NOTE — ED PROVIDER NOTE - CARE PLAN
Principal Discharge DX:	COVID-19 virus detected  Secondary Diagnosis:	Chest pain  Secondary Diagnosis:	Cough

## 2020-04-20 NOTE — ED CDU PROVIDER DISPOSITION NOTE - PATIENT PORTAL LINK FT
You can access the FollowMyHealth Patient Portal offered by [facility] by registering at the following website: [email or web address]. By joining [employer]’s FollowMyHealth portal, you will also be able to view your health information using other applications (apps) compatible with our system.

## 2020-04-20 NOTE — ED CDU PROVIDER DISPOSITION NOTE - CARE PROVIDER_API CALL
Brody Mock (MD)  Cardiovascular Disease; Internal Medicine  [address] [address]  Phone: [phone]  Fax: [phone]  Follow Up Time: 4-6 Days

## 2020-04-20 NOTE — ED CDU PROVIDER DISPOSITION NOTE - ATTENDING CONTRIBUTION TO CARE
I saw and evaluated patient with ACP. I discussed H+P and MDM with ACP. I agree with the statements made by the ACP unless otherwise noted.    The care of this patient was in support of the [facility] countermeasures to Covid-19.

## 2020-04-20 NOTE — ED PROVIDER NOTE - NSFOLLOWUPINSTRUCTIONS_ED_ALL_ED_FT
Follow up with your cardiologist in 2-3 days for STRESS test, or call [phone] and arrange a follow up with our clinic, state you were seen in the emergency department and would like a rapid appointment.  - Return to the Emergency Room for any new or worsening symptoms.  - Please take Tylenol 650mg every 6 hours as needed for pain.

## 2020-04-20 NOTE — ED PROVIDER NOTE - ATTENDING CONTRIBUTION TO CARE
Nemes - 55yo F w HTN, HLD p/w SOB x 5 weeks, diagnosed with covid 4 weeks ago. Today complaining of non productive cough since yesterday without fevers and chest pain, mid chest especially when she coughs. Chest pain has been ongoing since 4/12 and had neg CTA for PE but + for covid at that time. seen pulmonary, has appointment w Cards in June for Echo. Mild tachycardia but otherwise VS wnl, well appearing, in NAD, lungs clear, cardiac wnl, no JVD. Moist mucosae, pink conjunctivae. Abdomen soft/NT, no CVAT. No pedal edema, no calf TTP. Will get cardiac w/u, CXR. Likely DC or CDU for Echo in the am. Stx likely 2/2 Covid infection

## 2020-04-21 ENCOUNTER — TRANSCRIPTION ENCOUNTER (OUTPATIENT)
Age: 55
End: 2020-04-21

## 2020-04-21 VITALS
TEMPERATURE: 98 F | HEART RATE: 98 BPM | SYSTOLIC BLOOD PRESSURE: 113 MMHG | DIASTOLIC BLOOD PRESSURE: 71 MMHG | OXYGEN SATURATION: 100 % | RESPIRATION RATE: 22 BRPM

## 2020-04-21 LAB
ANION GAP SERPL CALC-SCNC: 21 MMOL/L — HIGH (ref 5–17)
BUN SERPL-MCNC: 11 MG/DL — SIGNIFICANT CHANGE UP (ref 7–23)
CALCIUM SERPL-MCNC: 10.6 MG/DL — HIGH (ref 8.4–10.5)
CHLORIDE SERPL-SCNC: 107 MMOL/L — SIGNIFICANT CHANGE UP (ref 96–108)
CO2 SERPL-SCNC: 18 MMOL/L — LOW (ref 22–31)
CREAT SERPL-MCNC: 1.08 MG/DL — SIGNIFICANT CHANGE UP (ref 0.5–1.3)
D DIMER BLD IA.RAPID-MCNC: 406 NG/ML DDU — HIGH
GLUCOSE SERPL-MCNC: 115 MG/DL — HIGH (ref 70–99)
MAGNESIUM SERPL-MCNC: 2.3 MG/DL — SIGNIFICANT CHANGE UP (ref 1.6–2.6)
PHOSPHATE SERPL-MCNC: 2.3 MG/DL — LOW (ref 2.5–4.5)
POTASSIUM SERPL-MCNC: 4.5 MMOL/L — SIGNIFICANT CHANGE UP (ref 3.5–5.3)
POTASSIUM SERPL-SCNC: 4.5 MMOL/L — SIGNIFICANT CHANGE UP (ref 3.5–5.3)
SODIUM SERPL-SCNC: 146 MMOL/L — HIGH (ref 135–145)

## 2020-04-21 PROCEDURE — 71275 CT ANGIOGRAPHY CHEST: CPT | Mod: 26

## 2020-04-21 PROCEDURE — 85027 COMPLETE CBC AUTOMATED: CPT

## 2020-04-21 PROCEDURE — 80048 BASIC METABOLIC PNL TOTAL CA: CPT

## 2020-04-21 PROCEDURE — 71275 CT ANGIOGRAPHY CHEST: CPT

## 2020-04-21 PROCEDURE — G0378: CPT

## 2020-04-21 PROCEDURE — 84484 ASSAY OF TROPONIN QUANT: CPT

## 2020-04-21 PROCEDURE — 99284 EMERGENCY DEPT VISIT MOD MDM: CPT | Mod: 25

## 2020-04-21 PROCEDURE — 99217: CPT

## 2020-04-21 PROCEDURE — 85379 FIBRIN DEGRADATION QUANT: CPT

## 2020-04-21 PROCEDURE — 84100 ASSAY OF PHOSPHORUS: CPT

## 2020-04-21 PROCEDURE — 93005 ELECTROCARDIOGRAM TRACING: CPT

## 2020-04-21 PROCEDURE — 83735 ASSAY OF MAGNESIUM: CPT

## 2020-04-21 PROCEDURE — 71045 X-RAY EXAM CHEST 1 VIEW: CPT

## 2020-04-21 PROCEDURE — 80053 COMPREHEN METABOLIC PANEL: CPT

## 2020-04-21 RX ADMIN — SODIUM CHLORIDE 3 MILLILITER(S): 9 INJECTION INTRAMUSCULAR; INTRAVENOUS; SUBCUTANEOUS at 05:01

## 2020-04-21 NOTE — ED CDU PROVIDER SUBSEQUENT DAY NOTE - PROGRESS NOTE DETAILS
GABRIEL Prajapati: Patient seen at bedside in NAD.  VSS.  Patient resting comfortably however reports sternal chest pressure and SOB. pending TTE. no events over tele. will discuss with cards Dr. Mock GABRIEL Prajapati: Patient seen at bedside in NAD.  VSS.  Patient resting comfortably however reports sternal chest pressure and SOB. pending TTE. no events over tele. will discuss with cards Dr. Mock. Repeat EKG performed which is unchanged MICHELLE Fair MD: Pt resting comfortably at this time, reports mild SOB. No events o/n on tele. Pending TTE at this time and d/w Cardiology re: further plan. Will continue to monitor. MICHELLE Fair MD: Per Cardiology (echo), Dr. Frausto, they are trying to limit their exposure to covid+ patients. He spoke with Dr. Mock who felt that pt can f/u as outpt for echo. I spoke with Dr. Mock, reviewed pt presentation and results. He recommended ruling out PE with her c/o SOB and CP in context of recent covid infection. Pt without LE edema or pain. MlB4=677% and HR without tachycardia. Will obtain d-dimer. If positive, will obtain CTA. If negative, will reassess pt for potential d/c home with outpt echo to be arranged by Dr. Mock. GABRIEL Prajapati: Patient seen at bedside in NAD.  VSS.  Patient resting comfortably without complaints. Spo2 100% on room air. pending lab results PA Barretta: CTA consistent with COVID ground glass opacities, negative for PE. cleared for discharge per ED attending Dr. Fair

## 2020-04-21 NOTE — ED CDU PROVIDER SUBSEQUENT DAY NOTE - MEDICAL DECISION MAKING DETAILS
MICHELLE Fair MD: 54F hx htn, hld presents with ongoing sob x 5 weeks, diagnosed with covid 4 weeks ago. Today complaining of non productive cough since yesterday without fevers and chest pain, mid chest especially when she coughs. chest pain has been ongoing since 4/12 and had neg CTA for PE but + for covid at that time. chest pain is non pleuritic, non radiating, improved with aspirin. Patient denies abd pain, N/V/D/C, weakness, HA, dizziness, urinary symptoms, extremity pain or swelling or other complaints. Cardiologist Brody Truong  In ED, patient had ekg NSR 84bpm, no signs of acute ischemia, QTc 437ms, troponin <6, chest x ray clear lungs. Resting pulse ox 97-99% and ambulatory pulse ox unchanged. No tachypnea, no labored breathing or signs of respiratory distress. Pt sent to CDU for frequent reeval, vitals q 4hrs, pulse ox monitor q 4hrs, Tele monitor, TTE, and observation.

## 2020-04-21 NOTE — ED ADULT NURSE REASSESSMENT NOTE - ANCILLARY STATUS
CDU for frequent reeval, vitals q 4hrs, pulse ox monitor q 4hrs, Tele monitor, TTE, and observation.

## 2020-04-21 NOTE — ED ADULT NURSE REASSESSMENT NOTE - NS ED NURSE REASSESS COMMENT FT1
Pt received from SALVADOR Chan. Pt oriented to CDU & plan of care was discussed. Pt A&O x 4. Pt denies any chest pain, SOB, dizziness or palpitations. Safety & comfort measures maintained. Call bell in reach. Will continue to monitor. Pt received from SALVADOR Chan. Pt oriented to CDU & plan of care was discussed. Pt A&O x 4. Pt in CDU for frequent reeval, vitals q 4hrs, pulse ox monitor q 4hrs, Tele monitor, TTE, and observation. Pt afebrile. Pt has dry cough. Lungs clear b/l. Pt c/o SOB just took her own inhaler, and mild chest pain, declines any medications at this time. Pt oxygen saturation is 98%. Pt on a cardiac monitor in NSR, HR in 80's. Pt denies any dizziness, palpitations, back pain, chills, nausea, vomiting. Pt resting in bed. Safety & comfort measures maintained. Call bell in reach. Will continue to monitor.

## 2020-04-21 NOTE — ED ADULT NURSE REASSESSMENT NOTE - NS ED NURSE REASSESS COMMENT FT1
Report taken from Felisha FRANCO. states pt have a good night with no complaints. Will continue to monitor.

## 2020-04-21 NOTE — ED CDU PROVIDER SUBSEQUENT DAY NOTE - ATTENDING CONTRIBUTION TO CARE
I saw and evaluated patient with ACP. I discussed H+P and MDM with ACP. I agree with the statements made by the ACP unless otherwise noted.    The care of this patient was in support of the [facility] countermeasures to Covid-19.

## 2020-04-21 NOTE — ED CDU PROVIDER SUBSEQUENT DAY NOTE - HISTORY
54F hx htn, hld presents with ongoing sob, chest pain and palpitations x 5 weeks, + covid 4 weeks ago. In ED, patient had ekg NSR no signs of acute ischemia, Troponin < 6. Pt sent to CDU for	frequent reeval, vitals q 4hrs, pulse ox monitor q 4hrs, Tele monitor, TTE, and observation. Pt currently resting without complaint. NAD, VSS. Sp02 96-99% on RA, Lungs CTAB. No events on telemetry. 54F hx htn, hld presents with ongoing sob, chest pain and palpitations x 5 weeks, + covid 4 weeks ago. In ED, patient had ekg NSR no signs of acute ischemia, Troponin < 6. Pt sent to CDU for frequent reeval, vitals q 4hrs, pulse ox monitor q 4hrs, Tele monitor, TTE, and observation. Pt currently resting without complaint. NAD, VSS. Sp02 96-99% on RA, Lungs CTAB. No events on telemetry.

## 2020-04-23 ENCOUNTER — EMERGENCY (EMERGENCY)
Facility: HOSPITAL | Age: 55
LOS: 1 days | Discharge: HOME | End: 2020-04-23
Attending: EMERGENCY MEDICINE
Payer: COMMERCIAL

## 2020-04-23 VITALS
DIASTOLIC BLOOD PRESSURE: 89 MMHG | OXYGEN SATURATION: 98 % | TEMPERATURE: 98 F | RESPIRATION RATE: 20 BRPM | SYSTOLIC BLOOD PRESSURE: 127 MMHG | HEART RATE: 89 BPM

## 2020-04-23 VITALS
TEMPERATURE: 98 F | DIASTOLIC BLOOD PRESSURE: 108 MMHG | WEIGHT: 160.06 LBS | RESPIRATION RATE: 18 BRPM | SYSTOLIC BLOOD PRESSURE: 155 MMHG | HEIGHT: 66 IN | OXYGEN SATURATION: 100 % | HEART RATE: 96 BPM

## 2020-04-23 DIAGNOSIS — Z98.89 OTHER SPECIFIED POSTPROCEDURAL STATES: Chronic | ICD-10-CM

## 2020-04-23 PROCEDURE — 99283 EMERGENCY DEPT VISIT LOW MDM: CPT

## 2020-04-23 PROCEDURE — 99284 EMERGENCY DEPT VISIT MOD MDM: CPT

## 2020-04-23 RX ORDER — ONDANSETRON 8 MG/1
4 TABLET, FILM COATED ORAL ONCE
Refills: 0 | Status: COMPLETED | OUTPATIENT
Start: 2020-04-23 | End: 2020-04-23

## 2020-04-23 RX ORDER — ASPIRIN/CALCIUM CARB/MAGNESIUM 324 MG
1 TABLET ORAL
Qty: 30 | Refills: 0
Start: 2020-04-23 | End: 2020-05-22

## 2020-04-23 RX ORDER — ASPIRIN/CALCIUM CARB/MAGNESIUM 324 MG
325 TABLET ORAL ONCE
Refills: 0 | Status: COMPLETED | OUTPATIENT
Start: 2020-04-23 | End: 2020-04-23

## 2020-04-23 RX ORDER — ONDANSETRON 8 MG/1
1 TABLET, FILM COATED ORAL
Qty: 21 | Refills: 0
Start: 2020-04-23 | End: 2020-04-29

## 2020-04-23 RX ORDER — ONDANSETRON 8 MG/1
1 TABLET, FILM COATED ORAL
Qty: 14 | Refills: 0
Start: 2020-04-23 | End: 2020-04-29

## 2020-04-23 RX ADMIN — Medication 325 MILLIGRAM(S): at 10:50

## 2020-04-23 RX ADMIN — ONDANSETRON 4 MILLIGRAM(S): 8 TABLET, FILM COATED ORAL at 10:50

## 2020-04-23 NOTE — ED PROVIDER NOTE - NS ED ROS FT
CONST: + fevers, + chills, no trauma  EYES: no pain, no visual disturbances  ENT: no sore throat, no epistaxis, no rhinorrhea, no hearing changes  CV: + chest pain, no palpitations, no orthopnea, no extremity pain or swelling  RESP: + shortness of breath, + cough, + sputum, no pleurisy, no wheezing  ABD: no abdominal pain, + nausea, no vomiting, + diarrhea, no black or bloody stool  : no dysuria, no hematuria, no frequency, no urgency  MSK: +body aches  NEURO: no headache, no sensory disturbances, no focal weakness, no dizziness  HEME: no easy bleeding or bruising  SKIN: no diaphoresis, no rash

## 2020-04-23 NOTE — DISCUSSION/SUMMARY
[FreeTextEntry1] : COVID positive diagnosis March 24, 2020\par Noted with shortness of breath and cardiac palpitations are\par No new fever reported\par Treatment protocol\par Doxycycline 100 mg 1 tablet p.o.twice daily\par Chest x-ray because [facility] is not doing outpatient radiology at the present time other than emergency department setting will send to outside radiology facility for follow-up x-ray\par Symptoms persist might even consider a CT angiogram protocol due to known thrombotic events\par 15 minute visit\par \par Unexplained episodes shortness of breath\par There is no descriptive evidence with these events of a bronchospastic event\par Very elevated IgE level with significant positive food and asthma Rast testing\par Associated eosinophilia\par Consistent with Atopy\par Aerated Flovent 110 MCG 2 puffs twice daily with instructions to rinse and pro-air for rescue therapy\par Recheck CBC and sedimentation rate\par Allergy evaluation recommended with Mitchell Boxer\par Also benefit from a workup to exclude monoclonal gammopathy\par Is no evidence to suggest parasitic type of infections without any significant or high risk travel\par  \par \par

## 2020-04-23 NOTE — ED PROVIDER NOTE - ATTENDING CONTRIBUTION TO CARE
Attending MD Panchal:  I personally have seen and examined this patient.  Resident note reviewed and agree on plan of care and except where noted.

## 2020-04-23 NOTE — ED PROVIDER NOTE - PATIENT PORTAL LINK FT
You can access the FollowMyHealth Patient Portal offered by [facility] by registering at the following website: [email or web address]. By joining [employer]’s FollowMyHealth portal, you will also be able to view your health information using other applications (apps) compatible with our system.

## 2020-04-23 NOTE — HISTORY OF PRESENT ILLNESS
[TextBox_4] : This visit was provided via telehealth using real-time 2-way audio visual technology. The patient, KRISTOPHER RAMIREZ , was located at home, [address] [facility]\[place] [address]  at the time of the visit.\par The provider, Dr. Raghav Morales, was located at office [address] [place] [address] at the time of the visit. \par \par  The patient, Ms. KRISTOPHER RAMIREZ  and Physician Raghav Morales  DO [facility] participated in the telehealth encounter.\par \par Verbal consent obtained by  from patient\par \par \par 5 weeks post COVID symptomatology\par Official COVID testing diagnosis March 24, 2020\par She is had some issues with her palpitations which is ongoing cardiology follow-up\par Now she is complaining of some increased shortness of breath\par I did review prior CT of the chest which did demonstrate some very mild findings of the viral pneumonia\par Fevers chills or sweats\par No lower extremity edema\par No wheezing\par Noted that she has not been using her Advair\par \par

## 2020-04-23 NOTE — ED ADULT NURSE NOTE - OBJECTIVE STATEMENT
patient is a 54 year old female with a PMH of hypertension who presents to the ED from home complaining of shortness of breath. patient recently seen for similar chief complaint. patient tested positive for covid 19 on march 24th. patient states has been experiencing cough and shortness of breath since last night. patient states " I felt like I couldn't breathe" patient also states she has been feeling nauseous and had diarrhea this morning before arriving to the ED. patient is aaox3, lungs CTA bilaterally, abd soft, nondistended, nontender, cap refill <3, radial and pedal pulses +2 bilaterally. patient denies fever, chills, abd pain, changes in bowel or bladder, hematuria, bloody stools. NSR on monitor. patient comfort and safety provided. will continue to monitor. MD Panchal at bedside.

## 2020-04-23 NOTE — PHYSICAL EXAM
[TextBox_68] : During visual telemedicine visit no cough no shortness of breath identified and able to speak in complete sentences without stopping

## 2020-04-23 NOTE — ED PROVIDER NOTE - PHYSICAL EXAMINATION
Const: Well-nourished, Well-developed, appearing stated age.  Eyes: PERRL, no conjunctival injection, and symmetrical lids.  HEENT: Head NCAT, no lesions. Atraumatic external nose and ears. +Dry MM   Neck: Symmetric, trachea midline, No thyromegaly.  CVS: +S1/S2, Peripheral pulses 2+ and equal in all extremities.  RESP: Unlabored respiratory effort. Mild crackles in LLL   GI: Nontender/Nondistended, No hepatosplenomegaly.  MSK: Normocephalic/Atraumatic, Extremities w/o deformity or ttp or edema in b/l LE.   Skin: Warm, dry and intact. No rashes or lesions.  Neuro: CNs II-XII grossly intact. Motor & Sensation grossly intact.  Psych: Awake, Alert, & Oriented (AAO) x3. Appropriate mood and affect.

## 2020-04-23 NOTE — ED PROVIDER NOTE - OBJECTIVE STATEMENT
54F hx of HTN and asthma, COVID + patient. 5 weeks of symptoms. +cough, SOB, chest pain, nausea, diarrhea, decreased PO intake. Seen here a few times this past month for progressively worsening symptoms. Last ED visit was on April 20th, patient stayed overnight in the CDU for observation with CTA for PE. Negative for clots, + for GGO consistent with COVID dx. Returning today because she hasn't been able to eat or drink well, and cough seems worse.

## 2020-04-23 NOTE — ED PROVIDER NOTE - NSFOLLOWUPINSTRUCTIONS_ED_ALL_ED_FT
For a 14 day period:  -Stay inside your home as much as possible, avoiding public places or public interaction  -Do not go to work  -If you do enter any public domain, at minimum wear a surgical mask at all times  -Even while indoors, attempt to remain isolated from other individuals such as family or friends, as much as possible  -Return to the Emergency Department for any symptoms such as worsening shortness of breath, significant worsening cough, high fevers despite over the counter fever-reducing medications, or severe weakness/malaise    Please  your prescription of Zofran and aspirin and take as directed.     Please continue to eat and drink to keep yourself hydrated and nourished.

## 2020-04-23 NOTE — ED PROVIDER NOTE - CLINICAL SUMMARY MEDICAL DECISION MAKING FREE TEXT BOX
54F with pmhx as listed above, COVID + presenting with worsening cough PE: VSS, O2 sat is 100 on RA, mild crackles LLL Ddx: Likely cough/sob 2/2 to known COVID dx, given CTA for PE was negative 2 days ago, and patient is not newly hypoxic or tachycardic, repeat test not warranted at this time Plan: Zofran for nausea, aspirin (given mildly elevated dimer). 54F with pmhx as listed above, COVID + presenting with worsening cough PE: VSS, O2 sat is 100 on RA, mild crackles LLL Ddx: Likely cough/sob 2/2 to known COVID dx, given CTA for PE was negative 2 days ago, and patient is not newly hypoxic or tachycardic, repeat test not warranted at this time Plan: Zofran for nausea, aspirin (given mildly elevated dimer).    Attending MD Panchal: 4F hx of HTN and asthma, COVID + patient. 5 weeks of symptoms. +cough, SOB, chest pain, nausea, diarrhea, decreased PO intake. Seen here a few times this past month for progressively worsening symptoms. Last ED visit was on April 20th, patient stayed overnight in the CDU for observation with CTA for PE. Negative for clots, + for GGO consistent with COVID dx. Returning today because she hasn't been able to eat or drink well, and cough seems worse. On exam patient in no respiratory distress and lungs are clear bilaterally, no LE edema or calf pain.  VS are within normal limits.  Will observe and discharge if remains stable.

## 2020-04-24 ENCOUNTER — APPOINTMENT (OUTPATIENT)
Dept: PULMONOLOGY | Facility: CLINIC | Age: 55
End: 2020-04-24
Payer: COMMERCIAL

## 2020-04-24 DIAGNOSIS — U07.1 COVID-19: ICD-10-CM

## 2020-04-24 DIAGNOSIS — J12.82 COVID-19: ICD-10-CM

## 2020-04-24 PROCEDURE — 99214 OFFICE O/P EST MOD 30 MIN: CPT | Mod: 95

## 2020-04-24 RX ORDER — MUPIROCIN 20 MG/G
2 OINTMENT TOPICAL
Qty: 22 | Refills: 0 | Status: DISCONTINUED | COMMUNITY
Start: 2019-11-27

## 2020-04-24 RX ORDER — AMLODIPINE BESYLATE 10 MG/1
10 TABLET ORAL
Qty: 30 | Refills: 0 | Status: DISCONTINUED | COMMUNITY
Start: 2020-03-29

## 2020-04-24 RX ORDER — METOPROLOL SUCCINATE 25 MG/1
25 TABLET, EXTENDED RELEASE ORAL
Qty: 30 | Refills: 0 | Status: DISCONTINUED | COMMUNITY
Start: 2020-04-08

## 2020-04-24 NOTE — HISTORY OF PRESENT ILLNESS
[Home] : at home, [unfilled] , at the time of the visit. [Medical Office: ([facility])___] : at the medical office located in  [Patient] : the patient [TextBox_4] : COVID positive\par Patient has had recurrent ER visits for shortness of breath dyspnea on exertion chest discomfort palpitations\par Work-up that ultimately been negative\par At last visit did have a CT angiogram protocol which was appropriate because of the high into the embolic disease\par It was negative for pulmonary embolism\par She did have evidence of the viral pneumonia with pleural-based patchy opacities bilaterally\par She is had significant associated understandably so anxiety she states\par Regular physician ordered Celexa\par She is requesting short acting antianxiety medication as well\par She has no fever\par No chills no sweats\par Respiratory status is improving\par No significant headaches\par She remains at home\par When she returns to work expects to hopefully be able to work from home\par Completing doxycycline\par \par Initial diagnosis of official COVID testing was March 24, 2020

## 2020-04-24 NOTE — DISCUSSION/SUMMARY
[FreeTextEntry1] : COVID positive diagnosis March 24, 2020\par Noted with shortness of breath and cardiac palpitations  with neg w/u including CT PA\par  CT PA - consistent with viral COVID  pneumonia\par  Anxiety aded low  dose  Xanax\par No new fever reported\par Treatment protocol\par Doxycycline 100 mg 1 tablet p.o.twice daily- pending to be completed\par \par 23 minute visit\par \par There is no descriptive evidence with these events of a bronchospastic event\par Very elevated IgE level with significant positive food and asthma Rast testing\par Associated eosinophilia\par Consistent with Atopy\par Aerated Flovent 110 MCG 2 puffs twice daily with instructions to rinse and pro-air for rescue therapy\par Allergy evaluation recommended with Mitchell Boxer\par Also benefit from a workup to exclude monoclonal gammopathy\par Is no evidence to suggest parasitic type of infections without any significant or high risk travel\par  \par Follow-up recheck CBC and sed rate\par Will need follow-up chest x-ray and complete PFT\par

## 2020-04-24 NOTE — REASON FOR VISIT
[Home] : at home, [unfilled] , at the time of the visit. [Medical Office: ([facility])___] : at the medical office located in  [Patient] : the patient

## 2020-04-24 NOTE — PHYSICAL EXAM
[No Acute Distress] : no acute distress [No Resp Distress] : no resp distress [TextBox_68] : no  resp distress , able to talk in full sentence

## 2020-04-24 NOTE — PROCEDURE
[FreeTextEntry1] : CT angiogram April 21, 2020\par Negative pulmonary embolism\par Bilateral groundglass opacities lower lobes along the pleura\par A viral pneumonia consistent with COVID pneumonia\par \par Hospital data review additional\par CT head April 16, 2024 left-sided numbness and weakness\par No acute intracranial findings\par No acute infarct hemorrhage mass lesions ventricles are normal in size no fluid collections April 12 CT angiogram\par Patchy peripheral groundglass opacities upper lobes lower lobes and right middle lobe compatible with COVID pneumonia\par Serum potassium from hospital visit April 16 3.4 was given oral potassium supplementation as well as IV\par EKG normal sinus rhythm no acute changes LVH versus normal variant\par \par Hospital data\par Chest x-ray March 31, 2020\par Borderline cardiomegaly\par Clear lungs\par No parenchymal infiltrates pleural effusions or dominant pulmonary nodules\par Official report clear lung\par \par Coronavirus 19 PCR positive\par Troponin negative\par CBC mild leukocytosis with white blood count 3.46\par Hematocrit 35.9\par Serum potassium 3.3\par Renal function normal\par \par EKG normal sinus rhythm voltage criteria for LVH versus normal variant no acute changes

## 2020-04-24 NOTE — ED PROCEDURE NOTE - PROCEDURE ADDITIONAL DETAILS
Emergency Department Focused Ultrasound performed at patient's bedside for educational purposes. The study will have a follow up study performed or was performed in the direct supervision of an ultrasound trained attending.
Lung was also performed, no consolidations, effusions or b-lines

## 2020-04-25 ENCOUNTER — MESSAGE (OUTPATIENT)
Age: 55
End: 2020-04-25

## 2020-04-30 RX ORDER — ONDANSETRON 8 MG/1
1 TABLET, FILM COATED ORAL
Qty: 9 | Refills: 0
Start: 2020-04-30 | End: 2020-05-02

## 2020-05-05 ENCOUNTER — APPOINTMENT (OUTPATIENT)
Dept: INTERNAL MEDICINE | Facility: CLINIC | Age: 55
End: 2020-05-05
Payer: COMMERCIAL

## 2020-05-05 VITALS
DIASTOLIC BLOOD PRESSURE: 94 MMHG | BODY MASS INDEX: 24.59 KG/M2 | SYSTOLIC BLOOD PRESSURE: 143 MMHG | WEIGHT: 153 LBS | TEMPERATURE: 98.5 F | HEIGHT: 66 IN

## 2020-05-05 DIAGNOSIS — Z90.710 ACQUIRED ABSENCE OF BOTH CERVIX AND UTERUS: ICD-10-CM

## 2020-05-05 PROCEDURE — 99204 OFFICE O/P NEW MOD 45 MIN: CPT | Mod: 95

## 2020-05-05 RX ORDER — CITALOPRAM HYDROBROMIDE 10 MG/1
10 TABLET, FILM COATED ORAL DAILY
Refills: 0 | Status: DISCONTINUED | COMMUNITY
Start: 2020-04-24 | End: 2020-05-05

## 2020-05-05 RX ORDER — DOXYCYCLINE HYCLATE 100 MG/1
100 CAPSULE ORAL
Qty: 14 | Refills: 0 | Status: DISCONTINUED | COMMUNITY
Start: 2020-04-20 | End: 2020-05-05

## 2020-05-05 NOTE — REVIEW OF SYSTEMS
[Fatigue] : no fatigue [Chest Pain] : chest pain [Palpitations] : palpitations [Shortness Of Breath] : shortness of breath [Wheezing] : wheezing [Insomnia] : insomnia [Anxiety] : anxiety [Negative] : Integumentary

## 2020-05-05 NOTE — HEALTH RISK ASSESSMENT
[] : No [No] : No [No falls in past year] : Patient reported no falls in the past year [0] : 2) Feeling down, depressed, or hopeless: Not at all (0) [[id number]] : 0 [Fully functional (bathing, dressing, toileting, transferring, walking, feeding)] : Fully functional (bathing, dressing, toileting, transferring, walking, feeding) [Fully functional (using the telephone, shopping, preparing meals, housekeeping, doing laundry, using] : Fully functional and needs no help or supervision to perform IADLs (using the telephone, shopping, preparing meals, housekeeping, doing laundry, using transportation, managing medications and managing finances) [Patient/Caregiver not ready to engage] : Patient/Caregiver not ready to engage [I will adhere to the patient's wishes as expressed in the advance directive except as noted below.] : I will adhere to the patient's wishes as expressed in the advance directive except as noted below [AdvancecareDate] : 5/5/2020

## 2020-05-05 NOTE — ASSESSMENT
[FreeTextEntry1] : Problem\par Asthma\par Corona virus infection\par Asthmatic bronchitis\par Tachycardia\par Chest pains\par Hypertension\par Insomnia\par Assessment\par I told the patient that the pulmonary complications of Corona virus may last for a while and she should follow up with Dr. Wren. In addition I am concerned by her episodes of tachycardia and chest pains. I ordered bloods and in addition I referred her to cardiology for an echocardiogram. The patient is due for colonoscopy and mammogram. I ordered trazodone to help her sleep

## 2020-05-05 NOTE — HISTORY OF PRESENT ILLNESS
[de-identified] : This is a 54-year-old patient of developed corona virus infection which affected predominantly her long period she has had complaints now of dyspnea and shortness of breath which is being followed by pulmonary and she is being treated with a nebulizer or it in addition she does have a history of hypertension and is complaining of episodes of tachycardia and chest pains. She has her own cardiologist that she seen but she has not had an echocardiogram as of yet. EKGs done at the hospital revealed LVH.

## 2020-05-11 ENCOUNTER — APPOINTMENT (OUTPATIENT)
Dept: CARDIOLOGY | Facility: CLINIC | Age: 55
End: 2020-05-11
Payer: COMMERCIAL

## 2020-05-11 ENCOUNTER — APPOINTMENT (OUTPATIENT)
Dept: PULMONOLOGY | Facility: CLINIC | Age: 55
End: 2020-05-11
Payer: COMMERCIAL

## 2020-05-11 ENCOUNTER — NON-APPOINTMENT (OUTPATIENT)
Age: 55
End: 2020-05-11

## 2020-05-11 VITALS
HEIGHT: 66 IN | WEIGHT: 151 LBS | HEART RATE: 94 BPM | DIASTOLIC BLOOD PRESSURE: 80 MMHG | SYSTOLIC BLOOD PRESSURE: 132 MMHG | BODY MASS INDEX: 24.27 KG/M2 | OXYGEN SATURATION: 100 %

## 2020-05-11 PROCEDURE — 36415 COLL VENOUS BLD VENIPUNCTURE: CPT

## 2020-05-11 PROCEDURE — 93306 TTE W/DOPPLER COMPLETE: CPT

## 2020-05-11 PROCEDURE — 99442: CPT

## 2020-05-11 PROCEDURE — 93000 ELECTROCARDIOGRAM COMPLETE: CPT

## 2020-05-11 PROCEDURE — 99204 OFFICE O/P NEW MOD 45 MIN: CPT

## 2020-05-11 RX ORDER — FLUTICASONE PROPIONATE AND SALMETEROL XINAFOATE 115; 21 UG/1; UG/1
115-21 AEROSOL, METERED RESPIRATORY (INHALATION)
Qty: 1 | Refills: 3 | Status: DISCONTINUED | COMMUNITY
Start: 2020-04-17 | End: 2020-05-11

## 2020-05-11 NOTE — REVIEW OF SYSTEMS
[Negative] : ENT [Shortness Of Breath] : shortness of breath [Dyspnea on exertion] : dyspnea during exertion [Chest Pain] : chest pain [Palpitations] : palpitations [see HPI] : see HPI [Anxiety] : anxiety [Under Stress] : under stress [Fever] : no fever [Headache] : no headache [Recent Weight Gain (___ Lbs)] : no recent weight gain [Chills] : no chills [Recent Weight Loss (___ Lbs)] : no recent weight loss [Chest  Pressure] : no chest pressure [Lower Ext Edema] : no extremity edema [Cough] : no cough [Leg Claudication] : no intermittent leg claudication [Change in Appetite] : no change in appetite [Wheezing] : no wheezing [Nausea] : no nausea [Change In The Stool] : no change in stool [Skin: A Rash] : no rash:

## 2020-05-11 NOTE — HISTORY OF PRESENT ILLNESS
[FreeTextEntry1] : May 11, 2020.  Patient followed by Dr. Lopez.  She was hospitalized only overnight for positive COVID-19.  Since then she has had multiple repeat visits to the emergency room complaining of palpitations, shortness of breath etc.  EKGs reportedly have been normal although there may be some LVH voltage.  Because of an elevated d-dimer a CT angio was done which was negative for pulmonary emboli although did have peripheral opacities consistent with COVID-19 viral pneumonia..  She has been followed by pulmonary and been treated with different inhalers.  She claims the palpitations can last all day although at times more severe than others and it does not seem that she is having true SVT etc.  Shortness of breath is also hard to quantify and her x-rays have been negative.  Her chest pain story is clearly atypical.  She has no known risk factors for coronary artery disease other than mild hypertension that is under control.  No smoking although she claims she used to smoke 2 cigarettes a day, no diabetes no hyperlipidemia.  No family history of coronary artery disease.  She claims she was told of a murmur in the past.  She does have a history of anxiety as well.

## 2020-05-11 NOTE — PHYSICAL EXAM
[General Appearance - Well Developed] : well developed [Normal Appearance] : normal appearance [Well Groomed] : well groomed [General Appearance - Well Nourished] : well nourished [No Deformities] : no deformities [General Appearance - In No Acute Distress] : no acute distress [Normal Conjunctiva] : the conjunctiva exhibited no abnormalities [Eyelids - No Xanthelasma] : the eyelids demonstrated no xanthelasmas [Normal Oral Mucosa] : normal oral mucosa [No Oral Pallor] : no oral pallor [No Oral Cyanosis] : no oral cyanosis [Normal Jugular Venous A Waves Present] : normal jugular venous A waves present [Normal Jugular Venous V Waves Present] : normal jugular venous V waves present [No Jugular Venous Santos A Waves] : no jugular venous santos A waves [Heart Rate And Rhythm] : heart rate and rhythm were normal [Heart Sounds] : normal S1 and S2 [Respiration, Rhythm And Depth] : normal respiratory rhythm and effort [Exaggerated Use Of Accessory Muscles For Inspiration] : no accessory muscle use [Auscultation Breath Sounds / Voice Sounds] : lungs were clear to auscultation bilaterally [Abdomen Soft] : soft [Abdomen Tenderness] : non-tender [Abdomen Mass (___ Cm)] : no abdominal mass palpated [Abnormal Walk] : normal gait [Gait - Sufficient For Exercise Testing] : the gait was sufficient for exercise testing [Nail Clubbing] : no clubbing of the fingernails [Cyanosis, Localized] : no localized cyanosis [Petechial Hemorrhages (___cm)] : no petechial hemorrhages [Skin Color & Pigmentation] : normal skin color and pigmentation [Skin Lesions] : no skin lesions [] : no rash [No Venous Stasis] : no venous stasis [No Xanthoma] : no  xanthoma was observed [No Skin Ulcers] : no skin ulcer [Affect] : the affect was normal [Oriented To Time, Place, And Person] : oriented to person, place, and time [Mood] : the mood was normal [FreeTextEntry1] : Somewhat anxious

## 2020-05-11 NOTE — REASON FOR VISIT
[FreeTextEntry1] : 54-year-old employee of [employer], status post COVID-19 in late March, with symptoms of palpitations and shortness of breath and atypical chest pain.

## 2020-05-12 ENCOUNTER — APPOINTMENT (OUTPATIENT)
Dept: GASTROENTEROLOGY | Facility: CLINIC | Age: 55
End: 2020-05-12
Payer: COMMERCIAL

## 2020-05-12 VITALS — WEIGHT: 152 LBS | BODY MASS INDEX: 24.53 KG/M2

## 2020-05-12 DIAGNOSIS — Z80.51 FAMILY HISTORY OF MALIGNANT NEOPLASM OF KIDNEY: ICD-10-CM

## 2020-05-12 DIAGNOSIS — F41.9 ANXIETY DISORDER, UNSPECIFIED: ICD-10-CM

## 2020-05-12 DIAGNOSIS — R63.4 ABNORMAL WEIGHT LOSS: ICD-10-CM

## 2020-05-12 LAB
25(OH)D3 SERPL-MCNC: 38.8 NG/ML
ALBUMIN SERPL ELPH-MCNC: 4.8 G/DL
ALP BLD-CCNC: 61 U/L
ALT SERPL-CCNC: 14 U/L
ANION GAP SERPL CALC-SCNC: 16 MMOL/L
AST SERPL-CCNC: 16 U/L
BASOPHILS # BLD AUTO: 0.01 K/UL
BASOPHILS NFR BLD AUTO: 0.2 %
BILIRUB SERPL-MCNC: 0.4 MG/DL
BUN SERPL-MCNC: 17 MG/DL
CALCIUM SERPL-MCNC: 10.4 MG/DL
CHLORIDE SERPL-SCNC: 102 MMOL/L
CHOLEST SERPL-MCNC: 154 MG/DL
CHOLEST/HDLC SERPL: 3.7 RATIO
CO2 SERPL-SCNC: 24 MMOL/L
CREAT SERPL-MCNC: 1.03 MG/DL
CRP SERPL-MCNC: <0.1 MG/DL
EOSINOPHIL # BLD AUTO: 0.07 K/UL
EOSINOPHIL NFR BLD AUTO: 1.2 %
ESTIMATED AVERAGE GLUCOSE: 120 MG/DL
GLUCOSE SERPL-MCNC: 102 MG/DL
HBA1C MFR BLD HPLC: 5.8 %
HCT VFR BLD CALC: 34.6 %
HDLC SERPL-MCNC: 41 MG/DL
HGB BLD-MCNC: 11.3 G/DL
IMM GRANULOCYTES NFR BLD AUTO: 0.2 %
INR PPP: 1.02 RATIO
LDLC SERPL CALC-MCNC: 96 MG/DL
LYMPHOCYTES # BLD AUTO: 1.41 K/UL
LYMPHOCYTES NFR BLD AUTO: 24.4 %
MAN DIFF?: NORMAL
MCHC RBC-ENTMCNC: 31.6 PG
MCHC RBC-ENTMCNC: 32.7 GM/DL
MCV RBC AUTO: 96.6 FL
MONOCYTES # BLD AUTO: 0.51 K/UL
MONOCYTES NFR BLD AUTO: 8.8 %
NEUTROPHILS # BLD AUTO: 3.76 K/UL
NEUTROPHILS NFR BLD AUTO: 65.2 %
NT-PROBNP SERPL-MCNC: 24 PG/ML
PLATELET # BLD AUTO: 243 K/UL
POTASSIUM SERPL-SCNC: 3.5 MMOL/L
PROT SERPL-MCNC: 7.9 G/DL
PT BLD: 11.6 SEC
RBC # BLD: 3.58 M/UL
RBC # FLD: 14.9 %
SARS-COV-2 IGG SERPL IA-ACNC: 11 INDEX
SARS-COV-2 IGG SERPL QL IA: POSITIVE
SODIUM SERPL-SCNC: 141 MMOL/L
T4 FREE SERPL-MCNC: 2.3 NG/DL
TRIGL SERPL-MCNC: 84 MG/DL
TSH SERPL-ACNC: 0.73 UIU/ML
URATE SERPL-MCNC: 6.2 MG/DL
VIT B12 SERPL-MCNC: 1003 PG/ML
WBC # FLD AUTO: 5.77 K/UL

## 2020-05-12 PROCEDURE — 99204 OFFICE O/P NEW MOD 45 MIN: CPT | Mod: 95

## 2020-05-12 NOTE — REVIEW OF SYSTEMS
[Feeling Tired] : feeling tired [Recent Weight Loss (___ Lbs)] : recent [unfilled] ~Ulb weight loss [Palpitations] : palpitations [Wheezing] : wheezing [Shortness Of Breath] : shortness of breath [Cough] : cough [As Noted in HPI] : as noted in HPI [Anxiety] : anxiety [Negative] : Heme/Lymph

## 2020-05-12 NOTE — HISTORY OF PRESENT ILLNESS
[Home] : at home, [unfilled] , at the time of the visit. [Medical Office: ([facility])___] : at the medical office located in  [Patient] : the patient [Self] : self [FreeTextEntry1] : Patient is a 55 y/o female diagnosed with COVID pneumnia in March. Has had several ER visits bur did not require hospitalization for COVID and anxiety manifested by palpitations and SOB. Cardiac w/u did not reveal any cardiac disease. Has recuperated but appetite was depressed and lost 15lbs.\par C/O 3-4 weeks of constipation. Took MOM for several days and finally had first BM in 1 week yesterday. Bloating improved and felt better. BM was dark. Had colonoscopy less than 3 years ago and was reportedly normal. Has Hx of colon polyps and family Hx of colon cancer.\par Still c/o some nausea when tries to overeat.\par \par Blood tests yesterday + for COVID antibodies, H/H 11.3/34.6, MCV 96.6.

## 2020-05-13 LAB
CK SERPL-CCNC: 116 U/L
FERRITIN SERPL-MCNC: 73 NG/ML

## 2020-05-15 ENCOUNTER — TRANSCRIPTION ENCOUNTER (OUTPATIENT)
Age: 55
End: 2020-05-15

## 2020-05-19 ENCOUNTER — TRANSCRIPTION ENCOUNTER (OUTPATIENT)
Age: 55
End: 2020-05-19

## 2020-05-27 ENCOUNTER — APPOINTMENT (OUTPATIENT)
Dept: PULMONOLOGY | Facility: CLINIC | Age: 55
End: 2020-05-27
Payer: COMMERCIAL

## 2020-05-27 ENCOUNTER — RX CHANGE (OUTPATIENT)
Age: 55
End: 2020-05-27

## 2020-05-27 VITALS
TEMPERATURE: 98.8 F | SYSTOLIC BLOOD PRESSURE: 125 MMHG | HEART RATE: 97 BPM | OXYGEN SATURATION: 100 % | DIASTOLIC BLOOD PRESSURE: 76 MMHG

## 2020-05-27 LAB
ANION GAP SERPL CALC-SCNC: 14 MMOL/L
BASOPHILS # BLD AUTO: 0.01 K/UL
BASOPHILS NFR BLD AUTO: 0.2 %
BUN SERPL-MCNC: 19 MG/DL
CALCIUM SERPL-MCNC: 10.3 MG/DL
CHLORIDE SERPL-SCNC: 102 MMOL/L
CO2 SERPL-SCNC: 24 MMOL/L
CREAT SERPL-MCNC: 1.05 MG/DL
EOSINOPHIL # BLD AUTO: 0.13 K/UL
EOSINOPHIL NFR BLD AUTO: 3 %
GLUCOSE SERPL-MCNC: 96 MG/DL
HCT VFR BLD CALC: 35.9 %
HGB BLD-MCNC: 11.6 G/DL
IMM GRANULOCYTES NFR BLD AUTO: 0.5 %
LYMPHOCYTES # BLD AUTO: 1.31 K/UL
LYMPHOCYTES NFR BLD AUTO: 30.3 %
MAN DIFF?: NORMAL
MCHC RBC-ENTMCNC: 31.7 PG
MCHC RBC-ENTMCNC: 32.3 GM/DL
MCV RBC AUTO: 98.1 FL
MONOCYTES # BLD AUTO: 0.46 K/UL
MONOCYTES NFR BLD AUTO: 10.6 %
NEUTROPHILS # BLD AUTO: 2.39 K/UL
NEUTROPHILS NFR BLD AUTO: 55.4 %
PLATELET # BLD AUTO: 236 K/UL
POTASSIUM SERPL-SCNC: 3.7 MMOL/L
RBC # BLD: 3.66 M/UL
RBC # FLD: 14.4 %
SODIUM SERPL-SCNC: 140 MMOL/L
WBC # FLD AUTO: 4.32 K/UL

## 2020-05-27 PROCEDURE — 99214 OFFICE O/P EST MOD 30 MIN: CPT | Mod: 25

## 2020-05-27 PROCEDURE — 71046 X-RAY EXAM CHEST 2 VIEWS: CPT

## 2020-05-27 PROCEDURE — 36415 COLL VENOUS BLD VENIPUNCTURE: CPT

## 2020-05-28 LAB — DEPRECATED D DIMER PPP IA-ACNC: <150 NG/ML DDU

## 2020-05-28 NOTE — PROCEDURE
[FreeTextEntry1] : DATA Review May 28  2020\par D-dimer negative less than 150\par \par COVID AB positive\par CHEST X ray PA lateral May 27, 2020\par Cardiac size normal\par Clear lung fields\par No parenchymal infiltrate pleural effusions dominant pulmonary nodules\par Arleth mediastinum normal\par Impression clear lungs\par CT angiogram April 21, 2020\par Negative pulmonary embolism\par Bilateral groundglass opacities lower lobes along the pleura\par A viral pneumonia consistent with COVID pneumonia\par \par Hospital data review additional\par CT head April 16, 2024 left-sided numbness and weakness\par No acute intracranial findings\par No acute infarct hemorrhage mass lesions ventricles are normal in size no fluid collections April 12 CT angiogram\par Patchy peripheral groundglass opacities upper lobes lower lobes and right middle lobe compatible with COVID pneumonia\par Serum potassium from hospital visit April 16 3.4 was given oral potassium supplementation as well as IV\par EKG normal sinus rhythm no acute changes LVH versus normal variant\par \par Hospital data\par Chest x-ray March 31, 2020\par Borderline cardiomegaly\par Clear lungs\par No parenchymal infiltrates pleural effusions or dominant pulmonary nodules\par Official report clear lung\par \par Coronavirus 19 PCR positive\par Troponin negative\par CBC mild leukocytosis with white blood count 3.46\par Hematocrit 35.9\par Serum potassium 3.3\par Renal function normal\par \par EKG normal sinus rhythm voltage criteria for LVH versus normal variant no acute changes

## 2020-05-28 NOTE — HISTORY OF PRESENT ILLNESS
[TextBox_4] : Patient states overnight while she does take her Advair at around midnight sometimes because she forgets during the morning and takes it in the early afternoon so she is spacing it about 12 hours apart\par She now states that she had some palpitations followed by some shortness of breath after she has used the Advair last evening\par Otherwise she is not describing any more active or significant respiratory symptoms of prior noted significant shortness of breath chest discomfort wheezing\par Initial diagnosis COVID-19 +March 24, 2020\par Recurrent episodes of shortness of breath cardiac palpitations with a negative work-up with multiple [facility] and emergency department of type visit including a CT angiogram protocol that was negative for pulmonary embolism\par  CTPA was consistent with viral COVID pneumonia and  negative PE\par She also notes she did have some component of anxiety and has used lowest dose Xanax and\par She is now greater than 8 weeks past the initial diagnosis of COVID-19\par No further episodes of bronchospasm reported as noted\par Prior data noted a very elevated IgE with significant positive food and asthma profile Rast testing\par Associated positive eosinophilia\par Consistent with atopic\par Data review\par Hospital blood work from [place]\par Serum magnesium normal range\par Mildly reduced phosphorus 2.3 serum glucose 115\par Calcium 10.6\par BUN 11 creatinine 1.08\par  D Dimer 406\par EKG April 21, 2020 normal sinus rhythm\par Voltage criteria for LVH\par Noted that she remains on Xanax and trazodone was added for sleep\par \par Cardiology\par Patient clearly had COVID-19 pneumonia.  Not unusual for there to be a component of myocarditis or pericarditis and usually these are with sick patients and usually manifest with LV dysfunction.  Patient also with anxiety disorder.  She has been to the emergency room numerous times and no findings consistent with palpitations or arrhythmia.  Negative CTA for pulmonary emboli.  Physical exam notable for a very mild probable MR murmur; patient mentions knowing of a murmur in the past.  It does not sound like a friction rub to me.  Her EKG is sinus rhythm at 90 with borderline LVH voltage only in lead I.  No significant ST or T wave changes.  Patient eventually underwent echocardiogram at the end of the day that showed no evidence of myocarditis or pericarditis.  There was no LVH.  There was only minimal MR and the rest of the echo was within normal limits.\par I reviewed all these findings in detail with the patient.  Perhaps there is subclinical myocarditis or pericarditis that is just taking time to resolve and perhaps some of the labs that were sent by myself and Dr. Lopez will be revealing in that regard.  Perhaps this is all due to stress and anxiety.  I reassured her that there is no life-threatening issues going on and that her symptoms one way or the other will probably resolve over the next few days or more likely weeks.  No need for current further cardiac work-up or treatment at this time. \par \par COVID AB positive

## 2020-05-28 NOTE — DISCUSSION/SUMMARY
[FreeTextEntry1] : \par Impression cannot exclude a component of inflammatory airway disease\par COVID-19 positive\par COVID pneumonia\par Rule out a component of anxiety\par Rule out a viral myocarditis-pericarditis is a possible etiology of the component of shortness of breath based on COVID etiology- noted  cardiology consult\par Deconditioning likely a component due to the severity of her COVID infection\par Recommendations\par Patient also has a GI consultation noted\par Blood work including d-dimer as patient remains on aspirin\par We schedule next appointment with PFT and protocol for COVID-19 PCR nasal swab\par Patient states she is scheduled with cardiology for an echocardiogram as an additional opinion tomorrow May 28, 2020\par

## 2020-05-29 ENCOUNTER — TRANSCRIPTION ENCOUNTER (OUTPATIENT)
Age: 55
End: 2020-05-29

## 2020-05-30 ENCOUNTER — EMERGENCY (EMERGENCY)
Facility: HOSPITAL | Age: 55
LOS: 1 days | Discharge: HOME | End: 2020-05-30
Attending: EMERGENCY MEDICINE
Payer: COMMERCIAL

## 2020-05-30 VITALS
RESPIRATION RATE: 20 BRPM | WEIGHT: 147.05 LBS | DIASTOLIC BLOOD PRESSURE: 83 MMHG | OXYGEN SATURATION: 100 % | SYSTOLIC BLOOD PRESSURE: 152 MMHG | TEMPERATURE: 99 F | HEIGHT: 66 IN | HEART RATE: 87 BPM

## 2020-05-30 VITALS
DIASTOLIC BLOOD PRESSURE: 69 MMHG | RESPIRATION RATE: 18 BRPM | HEART RATE: 73 BPM | SYSTOLIC BLOOD PRESSURE: 136 MMHG | OXYGEN SATURATION: 100 %

## 2020-05-30 DIAGNOSIS — Z98.89 OTHER SPECIFIED POSTPROCEDURAL STATES: Chronic | ICD-10-CM

## 2020-05-30 LAB
ALBUMIN SERPL ELPH-MCNC: 4.9 G/DL — SIGNIFICANT CHANGE UP (ref 3.3–5)
ALP SERPL-CCNC: 62 U/L — SIGNIFICANT CHANGE UP (ref 40–120)
ALT FLD-CCNC: 13 U/L — SIGNIFICANT CHANGE UP (ref 10–45)
ANION GAP SERPL CALC-SCNC: 15 MMOL/L — SIGNIFICANT CHANGE UP (ref 5–17)
APTT BLD: 41.5 SEC — HIGH (ref 27.5–36.3)
AST SERPL-CCNC: 15 U/L — SIGNIFICANT CHANGE UP (ref 10–40)
BILIRUB SERPL-MCNC: 0.4 MG/DL — SIGNIFICANT CHANGE UP (ref 0.2–1.2)
BUN SERPL-MCNC: 12 MG/DL — SIGNIFICANT CHANGE UP (ref 7–23)
CALCIUM SERPL-MCNC: 10.3 MG/DL — SIGNIFICANT CHANGE UP (ref 8.4–10.5)
CHLORIDE SERPL-SCNC: 103 MMOL/L — SIGNIFICANT CHANGE UP (ref 96–108)
CO2 SERPL-SCNC: 21 MMOL/L — LOW (ref 22–31)
CREAT SERPL-MCNC: 0.96 MG/DL — SIGNIFICANT CHANGE UP (ref 0.5–1.3)
D DIMER BLD IA.RAPID-MCNC: <150 NG/ML DDU — SIGNIFICANT CHANGE UP
GLUCOSE SERPL-MCNC: 108 MG/DL — HIGH (ref 70–99)
HCG SERPL-ACNC: <2 MIU/ML — SIGNIFICANT CHANGE UP
HCT VFR BLD CALC: 34.9 % — SIGNIFICANT CHANGE UP (ref 34.5–45)
HGB BLD-MCNC: 11.4 G/DL — LOW (ref 11.5–15.5)
INR BLD: 1.06 RATIO — SIGNIFICANT CHANGE UP (ref 0.88–1.16)
MCHC RBC-ENTMCNC: 31.1 PG — SIGNIFICANT CHANGE UP (ref 27–34)
MCHC RBC-ENTMCNC: 32.7 GM/DL — SIGNIFICANT CHANGE UP (ref 32–36)
MCV RBC AUTO: 95.4 FL — SIGNIFICANT CHANGE UP (ref 80–100)
NRBC # BLD: 0 /100 WBCS — SIGNIFICANT CHANGE UP (ref 0–0)
PLATELET # BLD AUTO: 227 K/UL — SIGNIFICANT CHANGE UP (ref 150–400)
POTASSIUM SERPL-MCNC: 3.1 MMOL/L — LOW (ref 3.5–5.3)
POTASSIUM SERPL-SCNC: 3.1 MMOL/L — LOW (ref 3.5–5.3)
PROT SERPL-MCNC: 8.2 G/DL — SIGNIFICANT CHANGE UP (ref 6–8.3)
PROTHROM AB SERPL-ACNC: 12.2 SEC — SIGNIFICANT CHANGE UP (ref 10–12.9)
RBC # BLD: 3.66 M/UL — LOW (ref 3.8–5.2)
RBC # FLD: 14 % — SIGNIFICANT CHANGE UP (ref 10.3–14.5)
SODIUM SERPL-SCNC: 139 MMOL/L — SIGNIFICANT CHANGE UP (ref 135–145)
TROPONIN T, HIGH SENSITIVITY RESULT: 9 NG/L — SIGNIFICANT CHANGE UP (ref 0–51)
TROPONIN T, HIGH SENSITIVITY RESULT: 9 NG/L — SIGNIFICANT CHANGE UP (ref 0–51)
WBC # BLD: 4.63 K/UL — SIGNIFICANT CHANGE UP (ref 3.8–10.5)
WBC # FLD AUTO: 4.63 K/UL — SIGNIFICANT CHANGE UP (ref 3.8–10.5)

## 2020-05-30 PROCEDURE — 70450 CT HEAD/BRAIN W/O DYE: CPT | Mod: 26

## 2020-05-30 PROCEDURE — 96374 THER/PROPH/DIAG INJ IV PUSH: CPT

## 2020-05-30 PROCEDURE — 85027 COMPLETE CBC AUTOMATED: CPT

## 2020-05-30 PROCEDURE — 71045 X-RAY EXAM CHEST 1 VIEW: CPT

## 2020-05-30 PROCEDURE — 71045 X-RAY EXAM CHEST 1 VIEW: CPT | Mod: 26

## 2020-05-30 PROCEDURE — 85379 FIBRIN DEGRADATION QUANT: CPT

## 2020-05-30 PROCEDURE — 85610 PROTHROMBIN TIME: CPT

## 2020-05-30 PROCEDURE — 84702 CHORIONIC GONADOTROPIN TEST: CPT

## 2020-05-30 PROCEDURE — 99284 EMERGENCY DEPT VISIT MOD MDM: CPT | Mod: 25

## 2020-05-30 PROCEDURE — 70450 CT HEAD/BRAIN W/O DYE: CPT

## 2020-05-30 PROCEDURE — 80053 COMPREHEN METABOLIC PANEL: CPT

## 2020-05-30 PROCEDURE — 99285 EMERGENCY DEPT VISIT HI MDM: CPT

## 2020-05-30 PROCEDURE — 93005 ELECTROCARDIOGRAM TRACING: CPT

## 2020-05-30 PROCEDURE — 85730 THROMBOPLASTIN TIME PARTIAL: CPT

## 2020-05-30 PROCEDURE — 93010 ELECTROCARDIOGRAM REPORT: CPT

## 2020-05-30 PROCEDURE — 84484 ASSAY OF TROPONIN QUANT: CPT

## 2020-05-30 RX ORDER — POTASSIUM CHLORIDE 20 MEQ
40 PACKET (EA) ORAL ONCE
Refills: 0 | Status: COMPLETED | OUTPATIENT
Start: 2020-05-30 | End: 2020-05-30

## 2020-05-30 RX ORDER — METOCLOPRAMIDE HCL 10 MG
10 TABLET ORAL ONCE
Refills: 0 | Status: COMPLETED | OUTPATIENT
Start: 2020-05-30 | End: 2020-05-30

## 2020-05-30 RX ORDER — SODIUM CHLORIDE 9 MG/ML
1000 INJECTION INTRAMUSCULAR; INTRAVENOUS; SUBCUTANEOUS ONCE
Refills: 0 | Status: COMPLETED | OUTPATIENT
Start: 2020-05-30 | End: 2020-05-30

## 2020-05-30 RX ORDER — ACETAMINOPHEN 500 MG
650 TABLET ORAL ONCE
Refills: 0 | Status: COMPLETED | OUTPATIENT
Start: 2020-05-30 | End: 2020-05-30

## 2020-05-30 RX ADMIN — Medication 10 MILLIGRAM(S): at 15:44

## 2020-05-30 RX ADMIN — SODIUM CHLORIDE 1000 MILLILITER(S): 9 INJECTION INTRAMUSCULAR; INTRAVENOUS; SUBCUTANEOUS at 15:44

## 2020-05-30 RX ADMIN — Medication 650 MILLIGRAM(S): at 15:43

## 2020-05-30 RX ADMIN — Medication 40 MILLIEQUIVALENT(S): at 15:44

## 2020-05-30 NOTE — ED PROVIDER NOTE - ATTENDING CONTRIBUTION TO CARE
55yo female pmh HTN p/w generalized HA x ~4 days a/w fatigue, presents today for left sided chest pain radiating to left shoulder since this morning. CP self-resolved, continues to have left neck pain, radiating down left shoulder and arm. Appears well, in no acute distress.   Neuro: Awake, alert and fully oriented x 4. No evidence of cognitive or language dysfunction.  Cranial nerves: Extraocular movements full. Pupils equal, round, react to light. No nystagmus noted. Fifth nerve function is normal. There is no facial asymmetry noted. CN XII intact.   Motor exam: good tone and bulk throughout. No pronator drift. Muscle strength is 5/5 throughout. Sensory examination is intact.   COVID+ last month. CT head for new HA. Labs with trop and dimer.

## 2020-05-30 NOTE — ED ADULT NURSE NOTE - OBJECTIVE STATEMENT
54 yr old female amb to ED with c/o waking up with l sided headache with numbness rad rt shoulder Denies fever or chest pain. C/o SOB on exertion. Was diagnosed with Covid 3/24. Afebrile Resp even and nonlab. 54 yr old female amb to ED with c/o waking up with l sided headache with numbness rad rt shoulder Denies fever or chest pain. C/o SOB on exertion. Was diagnosed with Covid 3/24. Afebrile Resp even and nonlab. Moving all four extremities No facial droop or slurred speech C/o rt eye discomfort no change in vision Denies N/V/D.

## 2020-05-30 NOTE — ED PROVIDER NOTE - CLINICAL SUMMARY MEDICAL DECISION MAKING FREE TEXT BOX
pt p/w headache, non-specific sx, had covid in March, no SOB/cp/fever- duration 2 days ago, frontal, dissipates with otc medications, non-focal neurologic exam, afebrile hd stbale and well appearing, ct, labs, sx control, likely dc with fu care  Catarina Bello, PGY-3 EM

## 2020-05-30 NOTE — ED PROVIDER NOTE - NS ED ROS FT
General: No fevers / chills  HENT: No head trauma, ear pain, runny nose, or sore throat  Eyes: No visual changes  CP: No chest pain, palpitations, or lightheadedness  Resp: No shortness of breath, no cough  GI: No abdominal pain, diarrhea, constipation, nausea, or vomiting  : No urinary fz, dysuria, or hematuria  Neuro: +headache, right sided arm numnbess/facial numbness

## 2020-05-30 NOTE — ED PROVIDER NOTE - OBJECTIVE STATEMENT
54F hx of HTN and asthma, COVID+ in March presents with headache 2 days duration. Pt reports the headache has worsened over the last 2 days- not maximal at onset. Denies weakness, change in gait, change in vison.  With associated numbness left side of face and arm. Pt has had headaches in the past but feels this is worse. Reports the HA to be bilateral, frontal, somewhat responds to tylenol.

## 2020-05-30 NOTE — ED PROVIDER NOTE - PROGRESS NOTE DETAILS
Dimer negative. Trop 9, will repeat. CT head negative. Will repeat trop. Most likely 2/2 cervical radiculopathy considering pain worse with certain movements, neck pain and radiating type paresthesias. trop negative 2x, well appearing on exam, went over results with patient, discussed outpatient follow up for management, return precautions discussed, stable for discharge home  Catarina Bello, PGY-3 EM

## 2020-05-30 NOTE — ED ADULT NURSE NOTE - NSIMPLEMENTINTERV_GEN_ALL_ED
Implemented All Universal Safety Interventions:  [place] to call system. Call bell, personal items and telephone within reach. Instruct patient to call for assistance. Room bathroom lighting operational. Non-slip footwear when patient is off stretcher. Physically safe environment: no spills, clutter or unnecessary equipment. Stretcher in lowest position, wheels locked, appropriate side rails in place.

## 2020-06-01 ENCOUNTER — TRANSCRIPTION ENCOUNTER (OUTPATIENT)
Age: 55
End: 2020-06-01

## 2020-06-01 ENCOUNTER — APPOINTMENT (OUTPATIENT)
Dept: ALLERGY | Facility: CLINIC | Age: 55
End: 2020-06-01

## 2020-06-02 ENCOUNTER — APPOINTMENT (OUTPATIENT)
Dept: INTERNAL MEDICINE | Facility: CLINIC | Age: 55
End: 2020-06-02
Payer: COMMERCIAL

## 2020-06-02 VITALS
DIASTOLIC BLOOD PRESSURE: 97 MMHG | HEART RATE: 79 BPM | BODY MASS INDEX: 23.3 KG/M2 | TEMPERATURE: 98.9 F | HEIGHT: 66 IN | SYSTOLIC BLOOD PRESSURE: 154 MMHG | WEIGHT: 145 LBS

## 2020-06-02 DIAGNOSIS — R20.2 PARESTHESIA OF SKIN: ICD-10-CM

## 2020-06-02 LAB — SARS-COV-2 N GENE NPH QL NAA+PROBE: NOT DETECTED

## 2020-06-02 PROCEDURE — 99214 OFFICE O/P EST MOD 30 MIN: CPT | Mod: 95

## 2020-06-02 NOTE — HISTORY OF PRESENT ILLNESS
[Home] : at home, [unfilled] , at the time of the visit. [Verbal consent obtained from patient] : the patient, [unfilled] [Medical Office: ([facility])___] : at the medical office located in  [de-identified] : This is a 54-year-old patient who is hospitalized with corona virus pneumonia. During hospitalization she also had a CTPA to exclude pulmonary emboli and it did. In addition she had an echocardiogram which was normal. She is recuperating from her corona virus infection chronic complaint right now is numbness and paresthesias on the left side of the face

## 2020-06-02 NOTE — REVIEW OF SYSTEMS
[Shortness Of Breath] : shortness of breath [Cough] : cough [Wheezing] : wheezing [Negative] : ENT [de-identified] : Facial paresthesias

## 2020-06-02 NOTE — ASSESSMENT
[FreeTextEntry1] : This is a 54-year-old patient with a history of rotavirus infection of the lung with pneumonitis who also was having shortness of breath and had a CTPA to exclude pulmonary emboli and that was negative. In addition she had an echocardiogram which revealed normal myocardial function. She resolved from her chronic virus pneumonitis her main complaint right now is left facial paresthesias. Chest CT the head which was negative. An MRI or an LP was not performed. I referred the patient to neurology for further evaluation.

## 2020-06-03 ENCOUNTER — APPOINTMENT (OUTPATIENT)
Dept: PULMONOLOGY | Facility: CLINIC | Age: 55
End: 2020-06-03

## 2020-06-04 ENCOUNTER — APPOINTMENT (OUTPATIENT)
Dept: PULMONOLOGY | Facility: CLINIC | Age: 55
End: 2020-06-04
Payer: COMMERCIAL

## 2020-06-04 VITALS
SYSTOLIC BLOOD PRESSURE: 115 MMHG | HEART RATE: 96 BPM | TEMPERATURE: 98.9 F | OXYGEN SATURATION: 100 % | DIASTOLIC BLOOD PRESSURE: 70 MMHG

## 2020-06-04 PROCEDURE — 94727 GAS DIL/WSHOT DETER LNG VOL: CPT

## 2020-06-04 PROCEDURE — 99212 OFFICE O/P EST SF 10 MIN: CPT | Mod: 25

## 2020-06-04 PROCEDURE — 94060 EVALUATION OF WHEEZING: CPT

## 2020-06-04 PROCEDURE — 94729 DIFFUSING CAPACITY: CPT

## 2020-06-04 NOTE — PROCEDURE
[FreeTextEntry1] : PFT transpleural June 4, 2020\par Spirometry normal\par No bronchodilator response at FEV1\par 1 times normal\par Total opacity of 30% predicted.\par Diffusion normal at 83% predicted.\par Flow rates identified inspiratory and expiratory limb\par Clinical correlation obstructive sleep apnea\par \par DATA Review May 28  2020\par D-dimer negative less than 150\par \par COVID AB positive\par CHEST X ray PA lateral May 27, 2020\par Cardiac size normal\par Clear lung fields\par No parenchymal infiltrate pleural effusions dominant pulmonary nodules\par Arleth mediastinum normal\par Impression clear lungs\par CT angiogram April 21, 2020\par Negative pulmonary embolism\par Bilateral groundglass opacities lower lobes along the pleura\par A viral pneumonia consistent with COVID pneumonia\par \par Hospital data review additional\par CT head April 16, 2024 left-sided numbness and weakness\par No acute intracranial findings\par No acute infarct hemorrhage mass lesions ventricles are normal in size no fluid collections April 12 CT angiogram\par Patchy peripheral groundglass opacities upper lobes lower lobes and right middle lobe compatible with COVID pneumonia\par Serum potassium from hospital visit April 16 3.4 was given oral potassium supplementation as well as IV\par EKG normal sinus rhythm no acute changes LVH versus normal variant\par \par Hospital data\par Chest x-ray March 31, 2020\par Borderline cardiomegaly\par Clear lungs\par No parenchymal infiltrates pleural effusions or dominant pulmonary nodules\par Official report clear lung\par \par Coronavirus 19 PCR positive\par Troponin negative\par CBC mild leukocytosis with white blood count 3.46\par Hematocrit 35.9\par Serum potassium 3.3\par Renal function normal\par \par EKG normal sinus rhythm voltage criteria for LVH versus normal variant no acute changes

## 2020-06-04 NOTE — PHYSICAL EXAM
[No Acute Distress] : no acute distress [Normal Oropharynx] : normal oropharynx [No Neck Mass] : no neck mass [Normal Appearance] : normal appearance [Normal Rate/Rhythm] : normal rate/rhythm [Normal S1, S2] : normal s1, s2 [No Murmurs] : no murmurs [No Resp Distress] : no resp distress [No Acc Muscle Use] : no acc muscle use [Benign] : benign [Normal Rhythm and Effort] : normal rhythm and effort [Normal Palpation] : normal palpation [Not Tender] : not tender [Soft] : soft [Normal Bowel Sounds] : normal bowel sounds [No HSM] : no hsm [Normal Gait] : normal gait [No Clubbing] : no clubbing [No Cyanosis] : no cyanosis [Normal Color/ Pigmentation] : normal color/ pigmentation [No Focal Deficits] : no focal deficits [No Rash] : no rash [Oriented x3] : oriented x3 [Normal Affect] : normal affect [TextBox_68] : no  resp distress , able to talk in full sentence

## 2020-06-04 NOTE — HISTORY OF PRESENT ILLNESS
[Snoring] : snoring [TextBox_4] : Patient states overnight while she does take her Advair at around midnight sometimes because she forgets during the morning and takes it in the early afternoon so she is spacing it about 12 hours apart\par She now states that she had some palpitations followed by some shortness of breath after she has used the Advair last evening\par Otherwise she is not describing any more active or significant respiratory symptoms of prior noted significant shortness of breath chest discomfort wheezing\par Initial diagnosis COVID-19 +March 24, 2020\par Recurrent episodes of shortness of breath cardiac palpitations with a negative work-up with multiple [facility] and emergency department of type visit including a CT angiogram protocol that was negative for pulmonary embolism\par  CTPA was consistent with viral COVID pneumonia and  negative PE\par She also notes she did have some component of anxiety and has used lowest dose Xanax and\par She is now greater than 8 weeks past the initial diagnosis of COVID-19\par No further episodes of bronchospasm reported as noted\par Prior data noted a very elevated IgE with significant positive food and asthma profile Rast testing\par Associated positive eosinophilia\par Consistent with atopic\par Data review\par Hospital blood work from [place]\par Serum magnesium normal range\par Mildly reduced phosphorus 2.3 serum glucose 115\par Calcium 10.6\par BUN 11 creatinine 1.08\par  D Dimer 406\par EKG April 21, 2020 normal sinus rhythm\par Voltage criteria for LVH\par Noted that she remains on Xanax and trazodone was added for sleep\par \par Cardiology\par Patient clearly had COVID-19 pneumonia.  Not unusual for there to be a component of myocarditis or pericarditis and usually these are with sick patients and usually manifest with LV dysfunction.  Patient also with anxiety disorder.  She has been to the emergency room numerous times and no findings consistent with palpitations or arrhythmia.  Negative CTA for pulmonary emboli.  Physical exam notable for a very mild probable MR murmur; patient mentions knowing of a murmur in the past.  It does not sound like a friction rub to me.  Her EKG is sinus rhythm at 90 with borderline LVH voltage only in lead I.  No significant ST or T wave changes.  Patient eventually underwent echocardiogram at the end of the day that showed no evidence of myocarditis or pericarditis.  There was no LVH.  There was only minimal MR and the rest of the echo was within normal limits.\par I reviewed all these findings in detail with the patient.  Perhaps there is subclinical myocarditis or pericarditis that is just taking time to resolve and perhaps some of the labs that were sent by myself and Dr. Lopez will be revealing in that regard.  Perhaps this is all due to stress and anxiety.  I reassured her that there is no life-threatening issues going on and that her symptoms one way or the other will probably resolve over the next few days or more likely weeks.  No need for current further cardiac work-up or treatment at this time. \par \par COVID AB positive

## 2020-06-04 NOTE — DISCUSSION/SUMMARY
[FreeTextEntry1] : Rule out obstructive sleep apnea\par Impression cannot exclude a component of inflammatory airway disease\par COVID-19 positive\par COVID pneumonia\par Rule out a component of anxiety\par Rule out a viral myocarditis-pericarditis is a possible etiology of the component of shortness of breath based on COVID etiology- noted  cardiology consult\par Deconditioning likely a component due to the severity of her COVID infection\par Recommendations\par Patient also has a GI consultation noted\par Blood work including d-dimer as patient remains on aspirin\par We schedule next appointment with PFT and protocol for COVID-19 PCR nasal swab\par Patient states she is scheduled with cardiology for an echocardiogram as an additional opinion tomorrow May 28, 2020\par Formal sleep study

## 2020-06-08 ENCOUNTER — APPOINTMENT (OUTPATIENT)
Dept: PULMONOLOGY | Facility: CLINIC | Age: 55
End: 2020-06-08
Payer: COMMERCIAL

## 2020-06-08 ENCOUNTER — APPOINTMENT (OUTPATIENT)
Dept: INTERNAL MEDICINE | Facility: CLINIC | Age: 55
End: 2020-06-08
Payer: COMMERCIAL

## 2020-06-08 VITALS — SYSTOLIC BLOOD PRESSURE: 120 MMHG | DIASTOLIC BLOOD PRESSURE: 70 MMHG

## 2020-06-08 VITALS — WEIGHT: 153.5 LBS | BODY MASS INDEX: 24.09 KG/M2 | TEMPERATURE: 98.2 F | HEIGHT: 67 IN

## 2020-06-08 DIAGNOSIS — R01.1 CARDIAC MURMUR, UNSPECIFIED: ICD-10-CM

## 2020-06-08 PROCEDURE — 99214 OFFICE O/P EST MOD 30 MIN: CPT

## 2020-06-08 PROCEDURE — 95800 SLP STDY UNATTENDED: CPT

## 2020-06-08 PROCEDURE — ZZZZZ: CPT

## 2020-06-09 PROCEDURE — 95800 SLP STDY UNATTENDED: CPT

## 2020-06-15 ENCOUNTER — APPOINTMENT (OUTPATIENT)
Dept: MRI IMAGING | Facility: CLINIC | Age: 55
End: 2020-06-15

## 2020-06-23 ENCOUNTER — RX CHANGE (OUTPATIENT)
Age: 55
End: 2020-06-23

## 2020-06-24 ENCOUNTER — OUTPATIENT (OUTPATIENT)
Dept: OUTPATIENT SERVICES | Facility: HOSPITAL | Age: 55
LOS: 1 days | End: 2020-06-24
Payer: COMMERCIAL

## 2020-06-24 ENCOUNTER — APPOINTMENT (OUTPATIENT)
Dept: MRI IMAGING | Facility: CLINIC | Age: 55
End: 2020-06-24
Payer: COMMERCIAL

## 2020-06-24 ENCOUNTER — RESULT REVIEW (OUTPATIENT)
Age: 55
End: 2020-06-24

## 2020-06-24 DIAGNOSIS — Z00.8 ENCOUNTER FOR OTHER GENERAL EXAMINATION: ICD-10-CM

## 2020-06-24 DIAGNOSIS — Z98.89 OTHER SPECIFIED POSTPROCEDURAL STATES: Chronic | ICD-10-CM

## 2020-06-24 DIAGNOSIS — R42 DIZZINESS AND GIDDINESS: ICD-10-CM

## 2020-06-24 PROCEDURE — 72141 MRI NECK SPINE W/O DYE: CPT | Mod: 26

## 2020-06-24 PROCEDURE — 72141 MRI NECK SPINE W/O DYE: CPT

## 2020-06-24 PROCEDURE — 70551 MRI BRAIN STEM W/O DYE: CPT | Mod: 26

## 2020-06-24 PROCEDURE — 70551 MRI BRAIN STEM W/O DYE: CPT

## 2020-06-25 ENCOUNTER — APPOINTMENT (OUTPATIENT)
Dept: PULMONOLOGY | Facility: CLINIC | Age: 55
End: 2020-06-25
Payer: COMMERCIAL

## 2020-06-25 VITALS
OXYGEN SATURATION: 99 % | SYSTOLIC BLOOD PRESSURE: 129 MMHG | HEART RATE: 86 BPM | TEMPERATURE: 99 F | RESPIRATION RATE: 16 BRPM | DIASTOLIC BLOOD PRESSURE: 76 MMHG

## 2020-06-25 DIAGNOSIS — J12.82 COVID-19: ICD-10-CM

## 2020-06-25 DIAGNOSIS — U07.1 COVID-19: ICD-10-CM

## 2020-06-25 PROCEDURE — 99214 OFFICE O/P EST MOD 30 MIN: CPT

## 2020-06-25 NOTE — HISTORY OF PRESENT ILLNESS
[Obstructive Sleep Apnea] : obstructive sleep apnea [Snoring] : snoring [TextBox_100] : 6/8-9/2020 [TextBox_4] : Completed sleep  stufy= GURJIT mild  moderate\par Patient states overnight while she does take her Advair at around midnight sometimes because she forgets during the morning and takes it in the early afternoon so she is spacing it about 12 hours apart\par palpitations  better and noted  below  completed  cardiac w/u negative\par Otherwise she is not describing any more active or significant respiratory symptoms of prior noted significant shortness of breath chest discomfort wheezing\par Initial diagnosis COVID-19 +March 24, 2020\par Recurrent episodes of shortness of breath cardiac palpitations with a negative work-up with multiple [facility] and emergency department of type visit including a CT angiogram protocol that was negative for pulmonary embolism\par  CTPA was consistent with viral COVID pneumonia and  negative PE\par She also notes she did have some component of anxiety and has used lowest dose Xanax and\par She is now greater than 8 weeks past the initial diagnosis of COVID-19\par No further episodes of bronchospasm reported as noted\par Prior data noted a very elevated IgE with significant positive food and asthma profile Rast testing\par Associated positive eosinophilia\par Consistent with atopic\par Data review\par Hospital blood work from [place]\par Serum magnesium normal range\par Mildly reduced phosphorus 2.3 serum glucose 115\par Calcium 10.6\par BUN 11 creatinine 1.08\par  D Dimer 406\par EKG April 21, 2020 normal sinus rhythm\par Voltage criteria for LVH\par Noted that she remains on Xanax and trazodone was added for sleep\par \par Cardiology\par Patient clearly had COVID-19 pneumonia.  Not unusual for there to be a component of myocarditis or pericarditis and usually these are with sick patients and usually manifest with LV dysfunction.  Patient also with anxiety disorder.  She has been to the emergency room numerous times and no findings consistent with palpitations or arrhythmia.  Negative CTA for pulmonary emboli.  Physical exam notable for a very mild probable MR murmur; patient mentions knowing of a murmur in the past.  It does not sound like a friction rub to me.  Her EKG is sinus rhythm at 90 with borderline LVH voltage only in lead I.  No significant ST or T wave changes.  Patient eventually underwent echocardiogram at the end of the day that showed no evidence of myocarditis or pericarditis.  There was no LVH.  There was only minimal MR and the rest of the echo was within normal limits.\par I reviewed all these findings in detail with the patient.  Perhaps there is subclinical myocarditis or pericarditis that is just taking time to resolve and perhaps some of the labs that were sent by myself and Dr. Lopez will be revealing in that regard.  Perhaps this is all due to stress and anxiety.  I reassured her that there is no life-threatening issues going on and that her symptoms one way or the other will probably resolve over the next few days or more likely weeks.  No need for current further cardiac work-up or treatment at this time. \par \par COVID AB positive [TextBox_108] : 13/20 [TextBox_112] : 0.5  [TextBox_116] : 84.6

## 2020-06-25 NOTE — PROCEDURE
[FreeTextEntry1] : PFT transflow June 4, 2020\par Spirometry normal\par No bronchodilator response at FEV1\par Lung volumes normal\par Diffusion normal at 83% predicted.\par Flow rates identified inspiratory and expiratory limb- flutter  wave\par Clinical correlation obstructive sleep apnea\par \par DATA Review May 28  2020\par D-dimer negative less than 150\par \par COVID AB positive\par CHEST X ray PA lateral May 27, 2020\par Cardiac size normal\par Clear lung fields\par No parenchymal infiltrate pleural effusions dominant pulmonary nodules\par Arleth mediastinum normal\par Impression clear lungs\par CT angiogram April 21, 2020\par Negative pulmonary embolism\par Bilateral groundglass opacities lower lobes along the pleura\par A viral pneumonia consistent with COVID pneumonia\par \par Hospital data review additional\par CT head April 16, 2024 left-sided numbness and weakness\par No acute intracranial findings\par No acute infarct hemorrhage mass lesions ventricles are normal in size no fluid collections April 12 CT angiogram\par Patchy peripheral groundglass opacities upper lobes lower lobes and right middle lobe compatible with COVID pneumonia\par Serum potassium from hospital visit April 16 3.4 was given oral potassium supplementation as well as IV\par EKG normal sinus rhythm no acute changes LVH versus normal variant\par \par Hospital data\par Chest x-ray March 31, 2020\par Borderline cardiomegaly\par Clear lungs\par No parenchymal infiltrates pleural effusions or dominant pulmonary nodules\par Official report clear lung\par \par Coronavirus 19 PCR positive\par Troponin negative\par CBC mild leukocytosis with white blood count 3.46\par Hematocrit 35.9\par Serum potassium 3.3\par Renal function normal\par \par EKG may  11 2020\par ECHO  May 11 2020\par min MR\par Nl PAP\par neg evidence myocarditis-pericarditis\par \par ate of Night 1 06/08/2020 Date of Birth 1965\par ate of Night 2 06/09/2020 Identification Number [id number]\par Overall AHI\par * Overall RDI % time < 90% SpO2 Mean SpO2 % time snoring > 30 dB\par 13 23 0.5 % 96.3 % 27.3 %\par HYSICIAN INTERPRETATION AND COMMENTS: Findings are consistent with moderate (AHI 20 on night #2), posi\par bstructive sleep apnea (GURJIT).\par LINICAL HISTORY: 54 year old female presented with: 15 inch neck, BMI of 24, an [place] sleepiness score of 11\par story of hypertension, depression. Based on the clinical history, the patient has a low pre-test probability of having mild GURJIT.\par LEEP STUDY FINDINGS: Patient underwent a two night Home Sleep Test and by behavioral criteria, slept for approxim\par 2.5 hours, with a sleep latency of 4 minutes and a sleep efficiency of 94.7%. Mild sleep disordered breathing (AHI=13) is \par ased on a 4% hypopnea desaturation criteria, predominantly in the supine position (16 events/hour). The patient slept supine 7\par f the night based on valid recording time of 12.47 hours and is 3.3 times as likely to have apneas/hypopneas when supine. W\par onsidering more subtle measures of sleep disordered breathing, the overall respiratory disturbance index is moderate (RD\par ased on a 1% hypopnea desaturation criteria with confirmation by surrogate arousal indicators. The apneas/hypopnea\par ccompanied by minimal oxygen desaturation (percent time below 90% SpO2: 0.5%, Min SpO2: 84.6%). The average desatu\par cross all sleep disordered breathing events is 2.8%. Snoring occurs for 27.3% (30 dB) of the study, 22.4% is very loud. The \par ulse rate is 67 BPM, with infrequent pulse rate variability (31 events with >= 6 BPM increase/decrease per hour).\par REATMENT CONSIDERATIONS: Consider nasal continuous positive airway pressure (CPAP) as the first treatment o\par ased on the AHI severity and co-morbidities. A mandibular advancement splint (MAS) or referral to an ENT surgeo\par odification to the airway should be considered to reduce the symptoms of sleep apnea if the patient prefers an alternative th\par r the CPAP trial is unsuccessful.. The patient should avoid sleeping supine; the non-supine RDI is 1.6 times less severe [place]\par upine RDI.\par ISEASE MANAGEMENT CONSIDERATIONS: Sleeping pills may increase the severity of untreated GURJIT and \par se should be reevaluated once the GURJIT is treated.\par gnature: Date: 06- 18:09:19 EST

## 2020-06-25 NOTE — PHYSICAL EXAM
[No Acute Distress] : no acute distress [Normal Oropharynx] : normal oropharynx [Normal Appearance] : normal appearance [Normal Rate/Rhythm] : normal rate/rhythm [No Neck Mass] : no neck mass [Normal S1, S2] : normal s1, s2 [No Murmurs] : no murmurs [No Resp Distress] : no resp distress [No Acc Muscle Use] : no acc muscle use [Normal Palpation] : normal palpation [Normal Rhythm and Effort] : normal rhythm and effort [Benign] : benign [Not Tender] : not tender [No HSM] : no hsm [Soft] : soft [Normal Bowel Sounds] : normal bowel sounds [Normal Gait] : normal gait [No Cyanosis] : no cyanosis [No Clubbing] : no clubbing [Normal Color/ Pigmentation] : normal color/ pigmentation [No Rash] : no rash [No Focal Deficits] : no focal deficits [Oriented x3] : oriented x3 [Normal Affect] : normal affect [TextBox_68] : no  resp distress , able to talk in full sentence

## 2020-06-25 NOTE — REVIEW OF SYSTEMS
[Cough] : cough [Dyspnea] : dyspnea [SOB on Exertion] : sob on exertion [Palpitations] : palpitations [Negative] : Gastrointestinal [Fever] : no fever [Fatigue] : no fatigue [Chills] : no chills [Poor Appetite] : no poor appetite [Chest Tightness] : no chest tightness [Sputum] : no sputum [Wheezing] : no wheezing [Chest Discomfort] : no chest discomfort [A.M. Dry Mouth] : no a.m. dry mouth [Headache] : no headache [Dizziness] : no dizziness

## 2020-06-25 NOTE — DISCUSSION/SUMMARY
[FreeTextEntry1] :  obstructive sleep apnea\par Impression cannot exclude a component of inflammatory airway disease\par COVID-19 positive\par COVID pneumonia\par Rule out a component of anxiety\par Rule out a viral myocarditis-pericarditis is a possible etiology of the component of shortness of breath based on COVID etiology- noted  cardiology consult\par Deconditioning likely a component due to the severity of her COVID infection\par Recommendations\par Patient also has a GI consultation noted\par Blood work including d-dimer as Off  aspirin\par Patient states she is scheduled with cardiology for an echocardiogram as an additional opinion tomorrow May 28, 2020- completed reviewed  and neg ECHO\par AUTO CPAP 6 -18 cm H20- f/u 1  month with data compliance review\par  explained usage 70 %  greater than 4 hrs\par Based on COVID pneumonia  and recommendation CROWN program - f/u CT CHEST\par watch PFT\par

## 2020-06-26 ENCOUNTER — RESULT REVIEW (OUTPATIENT)
Age: 55
End: 2020-06-26

## 2020-06-26 ENCOUNTER — APPOINTMENT (OUTPATIENT)
Dept: CT IMAGING | Facility: CLINIC | Age: 55
End: 2020-06-26
Payer: COMMERCIAL

## 2020-06-26 ENCOUNTER — OUTPATIENT (OUTPATIENT)
Dept: OUTPATIENT SERVICES | Facility: HOSPITAL | Age: 55
LOS: 1 days | End: 2020-06-26
Payer: COMMERCIAL

## 2020-06-26 DIAGNOSIS — U07.1 COVID-19: ICD-10-CM

## 2020-06-26 DIAGNOSIS — Z98.89 OTHER SPECIFIED POSTPROCEDURAL STATES: Chronic | ICD-10-CM

## 2020-06-26 DIAGNOSIS — J12.89 OTHER VIRAL PNEUMONIA: ICD-10-CM

## 2020-06-26 PROCEDURE — 71250 CT THORAX DX C-: CPT | Mod: 26

## 2020-06-26 PROCEDURE — 71250 CT THORAX DX C-: CPT

## 2020-07-02 ENCOUNTER — APPOINTMENT (OUTPATIENT)
Dept: PULMONOLOGY | Facility: CLINIC | Age: 55
End: 2020-07-02

## 2020-07-07 ENCOUNTER — TRANSCRIPTION ENCOUNTER (OUTPATIENT)
Age: 55
End: 2020-07-07

## 2020-07-08 ENCOUNTER — TRANSCRIPTION ENCOUNTER (OUTPATIENT)
Age: 55
End: 2020-07-08

## 2020-07-09 ENCOUNTER — TRANSCRIPTION ENCOUNTER (OUTPATIENT)
Age: 55
End: 2020-07-09

## 2020-07-09 NOTE — HISTORY OF PRESENT ILLNESS
[de-identified] : This is a 54-year-old patient who recently had chronic virus infection with the corona virus bronchitis and pneumonitis who responded and is doing better. Her main complaints at this point is that she has numbness on the left side of the face following the trigeminal nerve pathway. She still has a little cough with a little wheeze. Which is being treated with Cipro where and Flovent inhaler. She has a history of a nocuous heart murmur for which she had an echocardiogram

## 2020-07-09 NOTE — ASSESSMENT
[FreeTextEntry1] : Problems\par Hypertension\par Current of iris pneumonitis\par Heart murmur\par Trigeminal neuralgia\par Assessment\par The patient feels better at this point regarding her pulmonary status although she still has an occasional cough and occasional wheeze. She is due for her mammogram and Pap smear. In addition her physical findings do reveal a heart murmur a recent echocardiogram was normal. Am referring her to neurologist for evaluation of her trigeminal symptoms . she is also weakness,headaches ,dizzines and dyspnea

## 2020-07-09 NOTE — REVIEW OF SYSTEMS
[Fatigue] : fatigue [Cough] : cough [Dyspnea on Exertion] : dyspnea on exertion [Negative] : Genitourinary [Dizziness] : dizziness [de-identified] : Left facial lung

## 2020-07-15 ENCOUNTER — APPOINTMENT (OUTPATIENT)
Dept: ALLERGY | Facility: CLINIC | Age: 55
End: 2020-07-15
Payer: COMMERCIAL

## 2020-07-15 PROCEDURE — 95117 IMMUNOTHERAPY INJECTIONS: CPT

## 2020-07-22 ENCOUNTER — APPOINTMENT (OUTPATIENT)
Dept: ALLERGY | Facility: CLINIC | Age: 55
End: 2020-07-22
Payer: COMMERCIAL

## 2020-07-22 PROCEDURE — 95117 IMMUNOTHERAPY INJECTIONS: CPT

## 2020-07-27 NOTE — ED ADULT NURSE NOTE - NSFALLRSKASSESASSIST_ED_ALL_ED
Requesting medication refill. Please approve or deny this request.    Rx requested:  Requested Prescriptions     Pending Prescriptions Disp Refills    Prenatal Vit-Fe Fumarate-FA (PRENATAL VITAMIN PLUS LOW IRON) 27-1 MG TABS 90 tablet 1       Last Office Visit:   2/7/2020    Last Filled:  3-16-20    Last Labs:      Next Visit Date:  No future appointments. no

## 2020-07-28 ENCOUNTER — APPOINTMENT (OUTPATIENT)
Dept: PULMONOLOGY | Facility: CLINIC | Age: 55
End: 2020-07-28

## 2020-07-29 ENCOUNTER — APPOINTMENT (OUTPATIENT)
Dept: ALLERGY | Facility: CLINIC | Age: 55
End: 2020-07-29
Payer: COMMERCIAL

## 2020-07-29 ENCOUNTER — APPOINTMENT (OUTPATIENT)
Dept: PULMONOLOGY | Facility: CLINIC | Age: 55
End: 2020-07-29
Payer: COMMERCIAL

## 2020-07-29 VITALS
TEMPERATURE: 98.4 F | HEART RATE: 74 BPM | SYSTOLIC BLOOD PRESSURE: 134 MMHG | OXYGEN SATURATION: 100 % | DIASTOLIC BLOOD PRESSURE: 84 MMHG

## 2020-07-29 PROCEDURE — 95117 IMMUNOTHERAPY INJECTIONS: CPT

## 2020-07-29 PROCEDURE — 99214 OFFICE O/P EST MOD 30 MIN: CPT

## 2020-07-29 NOTE — REVIEW OF SYSTEMS
[Chills] : no chills [Fever] : no fever [Fatigue] : no fatigue [Poor Appetite] : no poor appetite [Cough] : cough [Chest Tightness] : no chest tightness [Sputum] : no sputum [Dyspnea] : dyspnea [A.M. Dry Mouth] : no a.m. dry mouth [Wheezing] : no wheezing [Chest Discomfort] : no chest discomfort [SOB on Exertion] : sob on exertion [Headache] : no headache [Palpitations] : palpitations [Dizziness] : no dizziness [Negative] : Gastrointestinal

## 2020-07-29 NOTE — PROCEDURE
[FreeTextEntry1] : PFT transflow June 4, 2020\par Spirometry normal\par No bronchodilator response at FEV1\par Lung volumes normal\par Diffusion normal at 83% predicted.\par Flow rates identified inspiratory and expiratory limb- flutter  wave\par Clinical correlation obstructive sleep apnea\par \par DATA Review May 28  2020\par D-dimer negative less than 150\par \par COVID AB positive\par CHEST X ray PA lateral May 27, 2020\par Cardiac size normal\par Clear lung fields\par No parenchymal infiltrate pleural effusions dominant pulmonary nodules\par Arleth mediastinum normal\par Impression clear lungs\par CT angiogram April 21, 2020\par Negative pulmonary embolism\par Bilateral groundglass opacities lower lobes along the pleura\par A viral pneumonia consistent with COVID pneumonia\par \par Hospital data review additional\par CT head April 16, 2024 left-sided numbness and weakness\par No acute intracranial findings\par No acute infarct hemorrhage mass lesions ventricles are normal in size no fluid collections April 12 CT angiogram\par Patchy peripheral groundglass opacities upper lobes lower lobes and right middle lobe compatible with COVID pneumonia\par Serum potassium from hospital visit April 16 3.4 was given oral potassium supplementation as well as IV\par EKG normal sinus rhythm no acute changes LVH versus normal variant\par \par Hospital data\par Chest x-ray March 31, 2020\par Borderline cardiomegaly\par Clear lungs\par No parenchymal infiltrates pleural effusions or dominant pulmonary nodules\par Official report clear lung\par \par Coronavirus 19 PCR positive\par Troponin negative\par CBC mild leukocytosis with white blood count 3.46\par Hematocrit 35.9\par Serum potassium 3.3\par Renal function normal\par \par EKG may  11 2020\par ECHO  May 11 2020\par min MR\par Nl PAP\par neg evidence myocarditis-pericarditis\par \par ate of Night 1 06/08/2020 Date of Birth 1965\par ate of Night 2 06/09/2020 Identification Number [id number]\par Overall AHI\par * Overall RDI % time < 90% SpO2 Mean SpO2 % time snoring > 30 dB\par 13 23 0.5 % 96.3 % 27.3 %\par HYSICIAN INTERPRETATION AND COMMENTS: Findings are consistent with moderate (AHI 20 on night #2), posi\par bstructive sleep apnea (GURJIT).\par LINICAL HISTORY: 54 year old female presented with: 15 inch neck, BMI of 24, an Clayton sleepiness score of 11\par story of hypertension, depression. Based on the clinical history, the patient has a low pre-test probability of having mild GURJIT.\par LEEP STUDY FINDINGS: Patient underwent a two night Home Sleep Test and by behavioral criteria, slept for approxim\par 2.5 hours, with a sleep latency of 4 minutes and a sleep efficiency of 94.7%. Mild sleep disordered breathing (AHI=13) is \par ased on a 4% hypopnea desaturation criteria, predominantly in the supine position (16 events/hour). The patient slept supine 7\par f the night based on valid recording time of 12.47 hours and is 3.3 times as likely to have apneas/hypopneas when supine. W\par onsidering more subtle measures of sleep disordered breathing, the overall respiratory disturbance index is moderate (RD\par ased on a 1% hypopnea desaturation criteria with confirmation by surrogate arousal indicators. The apneas/hypopnea\par ccompanied by minimal oxygen desaturation (percent time below 90% SpO2: 0.5%, Min SpO2: 84.6%). The average desatu\par cross all sleep disordered breathing events is 2.8%. Snoring occurs for 27.3% (30 dB) of the study, 22.4% is very loud. The \par ulse rate is 67 BPM, with infrequent pulse rate variability (31 events with >= 6 BPM increase/decrease per hour).\par REATMENT CONSIDERATIONS: Consider nasal continuous positive airway pressure (CPAP) as the first treatment o\par ased on the AHI severity and co-morbidities. A mandibular advancement splint (MAS) or referral to an ENT surgeo\par odification to the airway should be considered to reduce the symptoms of sleep apnea if the patient prefers an alternative th\par r the CPAP trial is unsuccessful.. The patient should avoid sleeping supine; the non-supine RDI is 1.6 times less severe barrington\par upine RDI.\par ISEASE MANAGEMENT CONSIDERATIONS: Sleeping pills may increase the severity of untreated GURJIT and \par se should be reevaluated once the GURJIT is treated.\par gnature: Date: 06- 18:09:19 EST

## 2020-07-29 NOTE — PHYSICAL EXAM
[No Acute Distress] : no acute distress [Normal Oropharynx] : normal oropharynx [Normal Appearance] : normal appearance [No Neck Mass] : no neck mass [Normal Rate/Rhythm] : normal rate/rhythm [No Murmurs] : no murmurs [Normal S1, S2] : normal s1, s2 [No Acc Muscle Use] : no acc muscle use [No Resp Distress] : no resp distress [Normal Palpation] : normal palpation [Normal Rhythm and Effort] : normal rhythm and effort [Benign] : benign [Soft] : soft [Not Tender] : not tender [No HSM] : no hsm [Normal Gait] : normal gait [Normal Bowel Sounds] : normal bowel sounds [No Clubbing] : no clubbing [No Cyanosis] : no cyanosis [Normal Color/ Pigmentation] : normal color/ pigmentation [No Rash] : no rash [Oriented x3] : oriented x3 [No Focal Deficits] : no focal deficits [Normal Affect] : normal affect [TextBox_68] : no  resp distress , able to talk in full sentence

## 2020-07-29 NOTE — HISTORY OF PRESENT ILLNESS
[TextBox_4] : Completed sleep  stufy= GURJIT mild  moderate\par Patient states overnight while she does take her Advair at around midnight sometimes because she forgets during the morning and takes it in the early afternoon so she is spacing it about 12 hours apart\par palpitations  better and noted  below  completed  cardiac w/u negative\par Otherwise she is not describing any more active or significant respiratory symptoms of prior noted significant shortness of breath chest discomfort wheezing\par Initial diagnosis COVID-19 +March 24, 2020\par Recurrent episodes of shortness of breath cardiac palpitations with a negative work-up with multiple [facility] and emergency department of type visit including a CT angiogram protocol that was negative for pulmonary embolism\par  CTPA was consistent with viral COVID pneumonia and  negative PE\par She also notes she did have some component of anxiety and has used lowest dose Xanax and\par She is now greater than 8 weeks past the initial diagnosis of COVID-19\par No further episodes of bronchospasm reported as noted\par Prior data noted a very elevated IgE with significant positive food and asthma profile Rast testing\par Associated positive eosinophilia\par Consistent with atopic\par Data review\par Hospital blood work from [place] [facility]\par Serum magnesium normal range\par Mildly reduced phosphorus 2.3 serum glucose 115\par Calcium 10.6\par BUN 11 creatinine 1.08\par  D Dimer 406\par EKG April 21, 2020 normal sinus rhythm\par Voltage criteria for LVH\par Noted that she remains on Xanax and trazodone was added for sleep\par \par Cardiology\par Patient clearly had COVID-19 pneumonia.  Not unusual for there to be a component of myocarditis or pericarditis and usually these are with sick patients and usually manifest with LV dysfunction.  Patient also with anxiety disorder.  She has been to the emergency room numerous times and no findings consistent with palpitations or arrhythmia.  Negative CTA for pulmonary emboli.  Physical exam notable for a very mild probable MR murmur; patient mentions knowing of a murmur in the past.  It does not sound like a friction rub to me.  Her EKG is sinus rhythm at 90 with borderline LVH voltage only in lead I.  No significant ST or T wave changes.  Patient eventually underwent echocardiogram at the end of the day that showed no evidence of myocarditis or pericarditis.  There was no LVH.  There was only minimal MR and the rest of the echo was within normal limits.\par I reviewed all these findings in detail with the patient.  Perhaps there is subclinical myocarditis or pericarditis that is just taking time to resolve and perhaps some of the labs that were sent by myself and Dr. Lopez will be revealing in that regard.  Perhaps this is all due to stress and anxiety.  I reassured her that there is no life-threatening issues going on and that her symptoms one way or the other will probably resolve over the next few days or more likely weeks.  No need for current further cardiac work-up or treatment at this time. \par \par COVID AB positive [Snoring] : snoring [Obstructive Sleep Apnea] : obstructive sleep apnea [TextBox_100] : 6/8-9/2020 [TextBox_112] : 0.5  [TextBox_108] : 13/20 [TextBox_116] : 84.6

## 2020-07-30 ENCOUNTER — RX RENEWAL (OUTPATIENT)
Age: 55
End: 2020-07-30

## 2020-08-03 ENCOUNTER — APPOINTMENT (OUTPATIENT)
Dept: INTERNAL MEDICINE | Facility: CLINIC | Age: 55
End: 2020-08-03
Payer: COMMERCIAL

## 2020-08-03 VITALS — SYSTOLIC BLOOD PRESSURE: 120 MMHG | DIASTOLIC BLOOD PRESSURE: 70 MMHG

## 2020-08-03 VITALS — BODY MASS INDEX: 23.23 KG/M2 | WEIGHT: 148 LBS | HEIGHT: 67 IN | TEMPERATURE: 98.2 F

## 2020-08-03 PROCEDURE — 99213 OFFICE O/P EST LOW 20 MIN: CPT

## 2020-08-03 NOTE — ASSESSMENT
[FreeTextEntry1] : This is a patient of who was exposed to cold with a full month ago and has had a very slow recovery. She presently feels fatigued and also she is feeling mildly depressed. I spoke to her about starting him on antidepressant and she said she will think about this. She has not suicidal and is not willing to see a therapist at this point in time her vital signs and physical examination on normal. I advised her to return in 2 weeks at that time I will repeat a clots

## 2020-08-03 NOTE — HISTORY OF PRESENT ILLNESS
[de-identified] : This is a 55-year-old woman who contracted toes with about 3 months ago initial complaining of of severe fatigue, loss of appetite, and feelings of multiple depression.

## 2020-08-03 NOTE — REVIEW OF SYSTEMS
[Fatigue] : fatigue [Cough] : cough [Dyspnea on Exertion] : dyspnea on exertion [Dizziness] : dizziness [Negative] : Genitourinary [de-identified] : Left facial lung

## 2020-08-13 ENCOUNTER — APPOINTMENT (OUTPATIENT)
Dept: ALLERGY | Facility: CLINIC | Age: 55
End: 2020-08-13
Payer: COMMERCIAL

## 2020-08-13 PROCEDURE — 95117 IMMUNOTHERAPY INJECTIONS: CPT

## 2020-08-17 ENCOUNTER — APPOINTMENT (OUTPATIENT)
Dept: INTERNAL MEDICINE | Facility: CLINIC | Age: 55
End: 2020-08-17
Payer: COMMERCIAL

## 2020-08-17 VITALS — SYSTOLIC BLOOD PRESSURE: 160 MMHG | DIASTOLIC BLOOD PRESSURE: 70 MMHG

## 2020-08-17 VITALS — WEIGHT: 150 LBS | HEIGHT: 67 IN | TEMPERATURE: 97.8 F | BODY MASS INDEX: 23.54 KG/M2

## 2020-08-17 PROCEDURE — 99214 OFFICE O/P EST MOD 30 MIN: CPT

## 2020-08-17 NOTE — REVIEW OF SYSTEMS
[Cough] : cough [Fatigue] : fatigue [Dizziness] : dizziness [Dyspnea on Exertion] : dyspnea on exertion [de-identified] : Left facial lung [Negative] : Genitourinary 88.5

## 2020-08-17 NOTE — HISTORY OF PRESENT ILLNESS
[de-identified] : This is a patient with a history of hypertension and cold exposure who is complaining of severe fatigue tiredness and occasional dyspnea. She also has trouble with anxiety and sleeping the patient also forgot her blood pressure meds today

## 2020-08-17 NOTE — ASSESSMENT
[FreeTextEntry1] : Problems\par Hypertension\par Coronal virus infection\par Fatigue\par Dyspnea on exertion\par Insomnia\par Anxiety\par Somnolence\par Her blood pressure was 160/90. The patient forgot to take her medications her physical examination is normal. Because of the above symptoms I recommended that the patient take another week's leave so that she can rest hemorrhage in her stamina.

## 2020-09-02 ENCOUNTER — APPOINTMENT (OUTPATIENT)
Dept: ALLERGY | Facility: CLINIC | Age: 55
End: 2020-09-02
Payer: COMMERCIAL

## 2020-09-02 PROCEDURE — 95117 IMMUNOTHERAPY INJECTIONS: CPT

## 2020-09-02 NOTE — PHYSICAL EXAM
[General Appearance - Well Developed] : well developed [Normal Appearance] : normal appearance [Well Groomed] : well groomed [General Appearance - Well Nourished] : well nourished [No Deformities] : no deformities [General Appearance - In No Acute Distress] : no acute distress [Normal Conjunctiva] : the conjunctiva exhibited no abnormalities [Eyelids - No Xanthelasma] : the eyelids demonstrated no xanthelasmas [Neck Appearance] : the appearance of the neck was normal [Neck Cervical Mass (___cm)] : no neck mass was observed [Jugular Venous Distention Increased] : there was no jugular-venous distention [Thyroid Diffuse Enlargement] : the thyroid was not enlarged [Heart Rate And Rhythm] : heart rate and rhythm were normal [Heart Sounds] : normal S1 and S2 [Arterial Pulses Normal] : the arterial pulses were normal [Edema] : no peripheral edema present [Veins - Varicosity Changes] : no varicosital changes were noted in the lower extremities [Respiration, Rhythm And Depth] : normal respiratory rhythm and effort [Exaggerated Use Of Accessory Muscles For Inspiration] : no accessory muscle use [Auscultation Breath Sounds / Voice Sounds] : lungs were clear to auscultation bilaterally [Chest Palpation] : palpation of the chest revealed no abnormalities [Lungs Percussion] : the lungs were normal to percussion [Bowel Sounds] : normal bowel sounds [Abdomen Soft] : soft [Abdomen Tenderness] : non-tender [Abdomen Mass (___ Cm)] : no abdominal mass palpated [Abnormal Walk] : normal gait [Gait - Sufficient For Exercise Testing] : the gait was sufficient for exercise testing [Nail Clubbing] : no clubbing of the fingernails [Cyanosis, Localized] : no localized cyanosis [Petechial Hemorrhages (___cm)] : no petechial hemorrhages [Skin Color & Pigmentation] : normal skin color and pigmentation [Skin Turgor] : normal skin turgor [] : no rash [Deep Tendon Reflexes (DTR)] : deep tendon reflexes were 2+ and symmetric [Sensation] : the sensory exam was normal to light touch and pinprick [Motor Exam] : the motor exam was normal [No Focal Deficits] : no focal deficits [Oriented To Time, Place, And Person] : oriented to person, place, and time [Impaired Insight] : insight and judgment were intact [Affect] : the affect was normal [Mood] : the mood was normal [FreeTextEntry1] : Faint 1/6 systolic murmur Transposition Flap Text: The defect edges were debeveled with a #15 scalpel blade.  Given the location of the defect and the proximity to free margins a transposition flap was deemed most appropriate.  Using a sterile surgical marker, an appropriate transposition flap was drawn incorporating the defect.    The area thus outlined was incised deep to adipose tissue with a #15 scalpel blade.  The skin margins were undermined to an appropriate distance in all directions utilizing iris scissors.

## 2020-09-08 ENCOUNTER — APPOINTMENT (OUTPATIENT)
Dept: ALLERGY | Facility: CLINIC | Age: 55
End: 2020-09-08
Payer: COMMERCIAL

## 2020-09-08 PROCEDURE — 95117 IMMUNOTHERAPY INJECTIONS: CPT

## 2020-09-16 ENCOUNTER — APPOINTMENT (OUTPATIENT)
Dept: ALLERGY | Facility: CLINIC | Age: 55
End: 2020-09-16
Payer: COMMERCIAL

## 2020-09-16 PROCEDURE — 95117 IMMUNOTHERAPY INJECTIONS: CPT

## 2020-09-22 ENCOUNTER — APPOINTMENT (OUTPATIENT)
Dept: ALLERGY | Facility: CLINIC | Age: 55
End: 2020-09-22
Payer: COMMERCIAL

## 2020-09-22 PROCEDURE — 95117 IMMUNOTHERAPY INJECTIONS: CPT

## 2020-09-29 ENCOUNTER — APPOINTMENT (OUTPATIENT)
Dept: PULMONOLOGY | Facility: CLINIC | Age: 55
End: 2020-09-29

## 2020-09-29 ENCOUNTER — APPOINTMENT (OUTPATIENT)
Dept: ALLERGY | Facility: CLINIC | Age: 55
End: 2020-09-29
Payer: COMMERCIAL

## 2020-09-29 PROCEDURE — 95117 IMMUNOTHERAPY INJECTIONS: CPT

## 2020-10-08 ENCOUNTER — APPOINTMENT (OUTPATIENT)
Dept: ALLERGY | Facility: CLINIC | Age: 55
End: 2020-10-08
Payer: COMMERCIAL

## 2020-10-08 PROCEDURE — 95117 IMMUNOTHERAPY INJECTIONS: CPT

## 2020-10-13 ENCOUNTER — APPOINTMENT (OUTPATIENT)
Dept: ALLERGY | Facility: CLINIC | Age: 55
End: 2020-10-13
Payer: COMMERCIAL

## 2020-10-13 PROCEDURE — 95117 IMMUNOTHERAPY INJECTIONS: CPT

## 2020-10-22 ENCOUNTER — APPOINTMENT (OUTPATIENT)
Dept: ALLERGY | Facility: CLINIC | Age: 55
End: 2020-10-22

## 2021-05-11 ENCOUNTER — RX RENEWAL (OUTPATIENT)
Age: 56
End: 2021-05-11

## 2021-06-02 ENCOUNTER — APPOINTMENT (OUTPATIENT)
Dept: MRI IMAGING | Facility: CLINIC | Age: 56
End: 2021-06-02

## 2021-07-13 ENCOUNTER — APPOINTMENT (OUTPATIENT)
Dept: MRI IMAGING | Facility: CLINIC | Age: 56
End: 2021-07-13

## 2021-07-15 NOTE — ED PROVIDER NOTE - PROGRESS NOTE ADDITIONAL1
Orientation to room/Bed in low position, brakes on/Side rails x 2 or 4 up, assess large gaps, such that a patient could get extremity or other body part entrapped, use additional safety procedures/Use of non-skid footwear for ambulating patients, use of appropriate size clothing to prevent risk of tripping Additional Progress Note...

## 2021-09-09 NOTE — ED ADULT TRIAGE NOTE - PAIN RATING/NUMBER SCALE (0-10): REST
Patient to continue with present therapy and decrease caffeine, avoid ETOH and smoking to decrease acid production  Pt should also cease eating prior to bedtime and avoid excessive fluid intake prior to sleep  May use antacids as needed for breakthrough GERD  All pateint questions answered today about this condition  2

## 2021-10-06 PROBLEM — U07.1 PNEUMONIA DUE TO COVID-19 VIRUS: Status: ACTIVE | Noted: 2020-04-24

## 2021-10-06 PROBLEM — U07.1 PNEUMONIA DUE TO COVID-19 VIRUS: Status: RESOLVED | Noted: 2020-04-17 | Resolved: 2021-10-06

## 2021-11-11 ENCOUNTER — RESULT REVIEW (OUTPATIENT)
Age: 56
End: 2021-11-11

## 2021-11-11 ENCOUNTER — APPOINTMENT (OUTPATIENT)
Dept: ULTRASOUND IMAGING | Facility: IMAGING CENTER | Age: 56
End: 2021-11-11
Payer: COMMERCIAL

## 2021-11-11 ENCOUNTER — OUTPATIENT (OUTPATIENT)
Dept: OUTPATIENT SERVICES | Facility: HOSPITAL | Age: 56
LOS: 1 days | End: 2021-11-11
Payer: COMMERCIAL

## 2021-11-11 ENCOUNTER — APPOINTMENT (OUTPATIENT)
Dept: MAMMOGRAPHY | Facility: IMAGING CENTER | Age: 56
End: 2021-11-11
Payer: COMMERCIAL

## 2021-11-11 DIAGNOSIS — Z98.89 OTHER SPECIFIED POSTPROCEDURAL STATES: Chronic | ICD-10-CM

## 2021-11-11 DIAGNOSIS — Z00.8 ENCOUNTER FOR OTHER GENERAL EXAMINATION: ICD-10-CM

## 2021-11-11 PROCEDURE — 77067 SCR MAMMO BI INCL CAD: CPT

## 2021-11-11 PROCEDURE — 77063 BREAST TOMOSYNTHESIS BI: CPT

## 2021-11-11 PROCEDURE — 77067 SCR MAMMO BI INCL CAD: CPT | Mod: 26

## 2021-11-11 PROCEDURE — 76641 ULTRASOUND BREAST COMPLETE: CPT | Mod: 26,50

## 2021-11-11 PROCEDURE — 77063 BREAST TOMOSYNTHESIS BI: CPT | Mod: 26

## 2021-11-11 PROCEDURE — 76641 ULTRASOUND BREAST COMPLETE: CPT

## 2021-11-16 DIAGNOSIS — R10.2 PELVIC AND PERINEAL PAIN: ICD-10-CM

## 2021-11-16 DIAGNOSIS — R14.0 ABDOMINAL DISTENSION (GASEOUS): ICD-10-CM

## 2021-11-16 DIAGNOSIS — Z20.822 CONTACT WITH AND (SUSPECTED) EXPOSURE TO COVID-19: ICD-10-CM

## 2021-11-16 DIAGNOSIS — R07.89 OTHER CHEST PAIN: ICD-10-CM

## 2021-11-16 DIAGNOSIS — Z86.69 PERSONAL HISTORY OF OTHER DISEASES OF THE NERVOUS SYSTEM AND SENSE ORGANS: ICD-10-CM

## 2021-11-16 DIAGNOSIS — G89.29 LEFT LOWER QUADRANT PAIN: ICD-10-CM

## 2021-11-16 DIAGNOSIS — R76.8 OTHER SPECIFIED ABNORMAL IMMUNOLOGICAL FINDINGS IN SERUM: ICD-10-CM

## 2021-11-16 DIAGNOSIS — R10.32 LEFT LOWER QUADRANT PAIN: ICD-10-CM

## 2021-11-16 DIAGNOSIS — R06.89 OTHER ABNORMALITIES OF BREATHING: ICD-10-CM

## 2021-11-16 DIAGNOSIS — Z87.09 PERSONAL HISTORY OF OTHER DISEASES OF THE RESPIRATORY SYSTEM: ICD-10-CM

## 2021-11-16 DIAGNOSIS — Z87.19 PERSONAL HISTORY OF OTHER DISEASES OF THE DIGESTIVE SYSTEM: ICD-10-CM

## 2021-11-23 ENCOUNTER — APPOINTMENT (OUTPATIENT)
Dept: ULTRASOUND IMAGING | Facility: IMAGING CENTER | Age: 56
End: 2021-11-23
Payer: COMMERCIAL

## 2021-11-23 ENCOUNTER — RESULT REVIEW (OUTPATIENT)
Age: 56
End: 2021-11-23

## 2021-11-23 ENCOUNTER — OUTPATIENT (OUTPATIENT)
Dept: OUTPATIENT SERVICES | Facility: HOSPITAL | Age: 56
LOS: 1 days | End: 2021-11-23
Payer: COMMERCIAL

## 2021-11-23 ENCOUNTER — APPOINTMENT (OUTPATIENT)
Dept: MAMMOGRAPHY | Facility: IMAGING CENTER | Age: 56
End: 2021-11-23
Payer: COMMERCIAL

## 2021-11-23 DIAGNOSIS — Z98.89 OTHER SPECIFIED POSTPROCEDURAL STATES: Chronic | ICD-10-CM

## 2021-11-23 DIAGNOSIS — Z00.8 ENCOUNTER FOR OTHER GENERAL EXAMINATION: ICD-10-CM

## 2021-11-23 PROCEDURE — 77065 DX MAMMO INCL CAD UNI: CPT

## 2021-11-23 PROCEDURE — 76642 ULTRASOUND BREAST LIMITED: CPT | Mod: 26,RT

## 2021-11-23 PROCEDURE — 77065 DX MAMMO INCL CAD UNI: CPT | Mod: 26,LT

## 2021-11-23 PROCEDURE — 76642 ULTRASOUND BREAST LIMITED: CPT

## 2021-11-30 NOTE — ED ADULT NURSE NOTE - NS ED NURSE DC PT EDUCATION RESOURCES
Patient is aware lab work from [place] was reviewed by Dr. Pipe Mendoza and patient is aware to keep appointment in January 2022, but he will need to cut back on drinking anything with sugar, as well as cut back on snacks like chips, crackers, candy and cookies. Patient verbalizes understanding. Records scanned.
None needed

## 2021-12-09 ENCOUNTER — LABORATORY RESULT (OUTPATIENT)
Age: 56
End: 2021-12-09

## 2021-12-09 ENCOUNTER — APPOINTMENT (OUTPATIENT)
Dept: INTERNAL MEDICINE | Facility: CLINIC | Age: 56
End: 2021-12-09
Payer: COMMERCIAL

## 2021-12-09 ENCOUNTER — NON-APPOINTMENT (OUTPATIENT)
Age: 56
End: 2021-12-09

## 2021-12-09 VITALS — WEIGHT: 173 LBS | HEIGHT: 67 IN | BODY MASS INDEX: 27.15 KG/M2

## 2021-12-09 VITALS — DIASTOLIC BLOOD PRESSURE: 80 MMHG | SYSTOLIC BLOOD PRESSURE: 140 MMHG

## 2021-12-09 PROCEDURE — 77080 DXA BONE DENSITY AXIAL: CPT

## 2021-12-09 PROCEDURE — 93000 ELECTROCARDIOGRAM COMPLETE: CPT

## 2021-12-09 PROCEDURE — 36415 COLL VENOUS BLD VENIPUNCTURE: CPT

## 2021-12-09 PROCEDURE — 99396 PREV VISIT EST AGE 40-64: CPT | Mod: 25

## 2021-12-09 NOTE — REVIEW OF SYSTEMS
[Fatigue] : fatigue [Cough] : cough [Dyspnea on Exertion] : dyspnea on exertion [Dizziness] : dizziness [Negative] : Psychiatric [de-identified] : Left facial lung

## 2021-12-09 NOTE — ASSESSMENT
[FreeTextEntry1] : Problems\par Hypertension\par Eosinophilia\par Sleep apnea\par Her blood pressure was well controlled her physical examination was unchanged.  The patient was referred to radiology and to her pulmonary specialist.

## 2021-12-09 NOTE — HEALTH RISK ASSESSMENT
[No] : No [No falls in past year] : Patient reported no falls in the past year [0] : 2) Feeling down, depressed, or hopeless: Not at all (0) [PHQ-2 Negative - No further assessment needed] : PHQ-2 Negative - No further assessment needed [] : No [[id number]] : 0

## 2021-12-09 NOTE — HISTORY OF PRESENT ILLNESS
[de-identified] : This is a 56-year-old patient with a history of hypertension, eosinophilia, sleep apnea syndrome and recently found calcifications of the breast who is here today for her annual examination

## 2021-12-10 LAB
25(OH)D3 SERPL-MCNC: 37.7 NG/ML
ALBUMIN SERPL ELPH-MCNC: 4.8 G/DL
ALP BLD-CCNC: 87 U/L
ALT SERPL-CCNC: 14 U/L
ANION GAP SERPL CALC-SCNC: 11 MMOL/L
APPEARANCE: CLEAR
AST SERPL-CCNC: 17 U/L
BACTERIA: NEGATIVE
BASOPHILS # BLD AUTO: 0.02 K/UL
BASOPHILS NFR BLD AUTO: 0.3 %
BILIRUB SERPL-MCNC: 0.2 MG/DL
BILIRUBIN URINE: NEGATIVE
BLOOD URINE: NEGATIVE
BUN SERPL-MCNC: 15 MG/DL
CALCIUM SERPL-MCNC: 10 MG/DL
CHLORIDE SERPL-SCNC: 103 MMOL/L
CHOLEST SERPL-MCNC: 183 MG/DL
CO2 SERPL-SCNC: 26 MMOL/L
COLOR: NORMAL
CREAT SERPL-MCNC: 1.08 MG/DL
EOSINOPHIL # BLD AUTO: 0.44 K/UL
EOSINOPHIL NFR BLD AUTO: 7.7 %
ESTIMATED AVERAGE GLUCOSE: 117 MG/DL
FERRITIN SERPL-MCNC: 76 NG/ML
FOLATE SERPL-MCNC: 8.9 NG/ML
GLUCOSE QUALITATIVE U: NEGATIVE
GLUCOSE SERPL-MCNC: 95 MG/DL
HBA1C MFR BLD HPLC: 5.7 %
HCT VFR BLD CALC: 37.8 %
HDLC SERPL-MCNC: 49 MG/DL
HGB BLD-MCNC: 12.2 G/DL
HYALINE CASTS: 3 /LPF
IMM GRANULOCYTES NFR BLD AUTO: 0.2 %
KETONES URINE: NEGATIVE
LDLC SERPL CALC-MCNC: 109 MG/DL
LEUKOCYTE ESTERASE URINE: NEGATIVE
LYMPHOCYTES # BLD AUTO: 2.59 K/UL
LYMPHOCYTES NFR BLD AUTO: 45 %
MAN DIFF?: NORMAL
MCHC RBC-ENTMCNC: 31.9 PG
MCHC RBC-ENTMCNC: 32.3 GM/DL
MCV RBC AUTO: 98.7 FL
MICROSCOPIC-UA: NORMAL
MONOCYTES # BLD AUTO: 0.43 K/UL
MONOCYTES NFR BLD AUTO: 7.5 %
NEUTROPHILS # BLD AUTO: 2.26 K/UL
NEUTROPHILS NFR BLD AUTO: 39.3 %
NITRITE URINE: NEGATIVE
NONHDLC SERPL-MCNC: 134 MG/DL
PH URINE: 6
PLATELET # BLD AUTO: 257 K/UL
POTASSIUM SERPL-SCNC: 3.9 MMOL/L
PROT SERPL-MCNC: 7.9 G/DL
PROTEIN URINE: NORMAL
RBC # BLD: 3.83 M/UL
RBC # FLD: 14.1 %
RED BLOOD CELLS URINE: 1 /HPF
SODIUM SERPL-SCNC: 140 MMOL/L
SPECIFIC GRAVITY URINE: 1.03
SQUAMOUS EPITHELIAL CELLS: 1 /HPF
T3RU NFR SERPL: 0.9 TBI
T4 SERPL-MCNC: 6.3 UG/DL
TRIGL SERPL-MCNC: 123 MG/DL
TSH SERPL-ACNC: 1.25 UIU/ML
URATE SERPL-MCNC: 4.2 MG/DL
UROBILINOGEN URINE: NORMAL
VIT B12 SERPL-MCNC: 1127 PG/ML
WBC # FLD AUTO: 5.75 K/UL
WHITE BLOOD CELLS URINE: 4 /HPF

## 2021-12-13 ENCOUNTER — NON-APPOINTMENT (OUTPATIENT)
Age: 56
End: 2021-12-13

## 2021-12-19 ENCOUNTER — RX RENEWAL (OUTPATIENT)
Age: 56
End: 2021-12-19

## 2021-12-20 ENCOUNTER — RESULT REVIEW (OUTPATIENT)
Age: 56
End: 2021-12-20

## 2021-12-20 ENCOUNTER — APPOINTMENT (OUTPATIENT)
Dept: MAMMOGRAPHY | Facility: IMAGING CENTER | Age: 56
End: 2021-12-20
Payer: COMMERCIAL

## 2021-12-20 ENCOUNTER — OUTPATIENT (OUTPATIENT)
Dept: OUTPATIENT SERVICES | Facility: HOSPITAL | Age: 56
LOS: 1 days | End: 2021-12-20
Payer: COMMERCIAL

## 2021-12-20 DIAGNOSIS — Z00.8 ENCOUNTER FOR OTHER GENERAL EXAMINATION: ICD-10-CM

## 2021-12-20 DIAGNOSIS — Z98.89 OTHER SPECIFIED POSTPROCEDURAL STATES: Chronic | ICD-10-CM

## 2021-12-20 PROCEDURE — 77065 DX MAMMO INCL CAD UNI: CPT

## 2021-12-20 PROCEDURE — 19081 BX BREAST 1ST LESION STRTCTC: CPT | Mod: LT

## 2021-12-20 PROCEDURE — A4648: CPT

## 2021-12-20 PROCEDURE — 88305 TISSUE EXAM BY PATHOLOGIST: CPT

## 2021-12-20 PROCEDURE — 19081 BX BREAST 1ST LESION STRTCTC: CPT

## 2021-12-20 PROCEDURE — 88305 TISSUE EXAM BY PATHOLOGIST: CPT | Mod: 26

## 2021-12-20 PROCEDURE — 77065 DX MAMMO INCL CAD UNI: CPT | Mod: 26,LT

## 2021-12-23 LAB — SURGICAL PATHOLOGY STUDY: SIGNIFICANT CHANGE UP

## 2022-01-19 ENCOUNTER — APPOINTMENT (OUTPATIENT)
Dept: SURGERY | Facility: CLINIC | Age: 57
End: 2022-01-19
Payer: COMMERCIAL

## 2022-01-19 PROCEDURE — 99205K: CUSTOM

## 2022-01-26 ENCOUNTER — OUTPATIENT (OUTPATIENT)
Dept: OUTPATIENT SERVICES | Facility: HOSPITAL | Age: 57
LOS: 1 days | End: 2022-01-26
Payer: COMMERCIAL

## 2022-01-26 ENCOUNTER — APPOINTMENT (OUTPATIENT)
Dept: MAMMOGRAPHY | Facility: IMAGING CENTER | Age: 57
End: 2022-01-26
Payer: COMMERCIAL

## 2022-01-26 VITALS
OXYGEN SATURATION: 98 % | WEIGHT: 173.06 LBS | DIASTOLIC BLOOD PRESSURE: 97 MMHG | HEIGHT: 67 IN | HEART RATE: 87 BPM | SYSTOLIC BLOOD PRESSURE: 154 MMHG | TEMPERATURE: 98 F | RESPIRATION RATE: 16 BRPM

## 2022-01-26 DIAGNOSIS — Z90.710 ACQUIRED ABSENCE OF BOTH CERVIX AND UTERUS: Chronic | ICD-10-CM

## 2022-01-26 DIAGNOSIS — I10 ESSENTIAL (PRIMARY) HYPERTENSION: ICD-10-CM

## 2022-01-26 DIAGNOSIS — R92.8 OTHER ABNORMAL AND INCONCLUSIVE FINDINGS ON DIAGNOSTIC IMAGING OF BREAST: ICD-10-CM

## 2022-01-26 DIAGNOSIS — Z00.8 ENCOUNTER FOR OTHER GENERAL EXAMINATION: ICD-10-CM

## 2022-01-26 DIAGNOSIS — Z98.89 OTHER SPECIFIED POSTPROCEDURAL STATES: Chronic | ICD-10-CM

## 2022-01-26 DIAGNOSIS — Z87.59 PERSONAL HISTORY OF OTHER COMPLICATIONS OF PREGNANCY, CHILDBIRTH AND THE PUERPERIUM: Chronic | ICD-10-CM

## 2022-01-26 DIAGNOSIS — N63.20 UNSPECIFIED LUMP IN THE LEFT BREAST, UNSPECIFIED QUADRANT: ICD-10-CM

## 2022-01-26 LAB
ANION GAP SERPL CALC-SCNC: 11 MMOL/L — SIGNIFICANT CHANGE UP (ref 7–14)
BUN SERPL-MCNC: 26 MG/DL — HIGH (ref 7–23)
CALCIUM SERPL-MCNC: 10 MG/DL — SIGNIFICANT CHANGE UP (ref 8.4–10.5)
CHLORIDE SERPL-SCNC: 103 MMOL/L — SIGNIFICANT CHANGE UP (ref 98–107)
CO2 SERPL-SCNC: 27 MMOL/L — SIGNIFICANT CHANGE UP (ref 22–31)
CREAT SERPL-MCNC: 1.07 MG/DL — SIGNIFICANT CHANGE UP (ref 0.5–1.3)
GLUCOSE SERPL-MCNC: 88 MG/DL — SIGNIFICANT CHANGE UP (ref 70–99)
HCT VFR BLD CALC: 38.8 % — SIGNIFICANT CHANGE UP (ref 34.5–45)
HGB BLD-MCNC: 12.4 G/DL — SIGNIFICANT CHANGE UP (ref 11.5–15.5)
MCHC RBC-ENTMCNC: 31.1 PG — SIGNIFICANT CHANGE UP (ref 27–34)
MCHC RBC-ENTMCNC: 32 GM/DL — SIGNIFICANT CHANGE UP (ref 32–36)
MCV RBC AUTO: 97.2 FL — SIGNIFICANT CHANGE UP (ref 80–100)
NRBC # BLD: 0 /100 WBCS — SIGNIFICANT CHANGE UP
NRBC # FLD: 0 K/UL — SIGNIFICANT CHANGE UP
PLATELET # BLD AUTO: 214 K/UL — SIGNIFICANT CHANGE UP (ref 150–400)
POTASSIUM SERPL-MCNC: 4.1 MMOL/L — SIGNIFICANT CHANGE UP (ref 3.5–5.3)
POTASSIUM SERPL-SCNC: 4.1 MMOL/L — SIGNIFICANT CHANGE UP (ref 3.5–5.3)
RBC # BLD: 3.99 M/UL — SIGNIFICANT CHANGE UP (ref 3.8–5.2)
RBC # FLD: 14.3 % — SIGNIFICANT CHANGE UP (ref 10.3–14.5)
SODIUM SERPL-SCNC: 141 MMOL/L — SIGNIFICANT CHANGE UP (ref 135–145)
WBC # BLD: 5.12 K/UL — SIGNIFICANT CHANGE UP (ref 3.8–10.5)
WBC # FLD AUTO: 5.12 K/UL — SIGNIFICANT CHANGE UP (ref 3.8–10.5)

## 2022-01-26 PROCEDURE — 19281 PERQ DEVICE BREAST 1ST IMAG: CPT | Mod: LT

## 2022-01-26 PROCEDURE — C1739: CPT

## 2022-01-26 PROCEDURE — 19281 PERQ DEVICE BREAST 1ST IMAG: CPT

## 2022-01-26 PROCEDURE — 93010 ELECTROCARDIOGRAM REPORT: CPT

## 2022-01-26 RX ORDER — SODIUM CHLORIDE 9 MG/ML
1000 INJECTION, SOLUTION INTRAVENOUS
Refills: 0 | Status: DISCONTINUED | OUTPATIENT
Start: 2022-02-08 | End: 2022-02-22

## 2022-01-26 NOTE — H&P PST ADULT - PROBLEM SELECTOR PLAN 1
Pt. is scheduled for left breast biopsy with localization 2/1/22.  Pt. verbalized understanding of instructions and that Chlorhexidine is for external use.

## 2022-01-26 NOTE — H&P PST ADULT - NSICDXPASTSURGICALHX_GEN_ALL_CORE_FT
PAST SURGICAL HISTORY:  Born by normal vaginal delivery 2 vaginal deliveries    H/O 4 abortions     H/O myomectomy 2007 for fibroid removal    H/O: hysterectomy 2016    History of lumpectomy of both breasts pt reports multiple biopsies and lumpectomy of both breasts

## 2022-01-26 NOTE — H&P PST ADULT - NSICDXPASTMEDICALHX_GEN_ALL_CORE_FT
PAST MEDICAL HISTORY:  Hernia umblical    Hypertension     Leiomyoma of uterus    Masses of both breasts     Palpitations     Seasonal allergies     Spontaneous  x2     PAST MEDICAL HISTORY:  Hernia umblical    Hypertension     Leiomyoma of uterus    Masses of both breasts     Obese     Palpitations     Seasonal allergies     Spontaneous  x2

## 2022-01-26 NOTE — H&P PST ADULT - PROBLEM SELECTOR PLAN 2
Repeat /94, pt. is asymptomatic.  Pt. has been out of her HCTZ for the last 4 days.  Instructed to refill it today.  Pt. had a stress test within the past 2 years that she did not complete and she had an ECHO 11/2021 and did not return for follow up.  Pt. c/o periodic palpitations.  Pt. instructed to obtain cardiac clearance.  Will email the Surgeon.

## 2022-01-26 NOTE — H&P PST ADULT - NSICDXFAMILYHX_GEN_ALL_CORE_FT
FAMILY HISTORY:  Father  Still living? Yes, Estimated age: Age Unknown  FH: kidney cancer, Age at diagnosis: Age Unknown    Grandparent  Still living? No  FH: colon cancer, Age at diagnosis: Age Unknown

## 2022-01-26 NOTE — H&P PST ADULT - LAST STRESS TEST
1-2 years ago, pass? "I didn't do the second part, it was right before covid, I didn't go back so it wasn't completed"

## 2022-01-28 PROBLEM — E66.9 OBESITY, UNSPECIFIED: Chronic | Status: ACTIVE | Noted: 2022-01-26

## 2022-01-28 PROBLEM — R00.2 PALPITATIONS: Chronic | Status: ACTIVE | Noted: 2022-01-26

## 2022-01-28 PROBLEM — J30.2 OTHER SEASONAL ALLERGIC RHINITIS: Chronic | Status: ACTIVE | Noted: 2022-01-26

## 2022-01-28 PROBLEM — O03.9 COMPLETE OR UNSPECIFIED SPONTANEOUS ABORTION WITHOUT COMPLICATION: Chronic | Status: ACTIVE | Noted: 2022-01-26

## 2022-01-28 PROBLEM — N63.10 UNSPECIFIED LUMP IN THE RIGHT BREAST, UNSPECIFIED QUADRANT: Chronic | Status: ACTIVE | Noted: 2022-01-26

## 2022-01-31 ENCOUNTER — APPOINTMENT (OUTPATIENT)
Dept: INTERNAL MEDICINE | Facility: CLINIC | Age: 57
End: 2022-01-31
Payer: COMMERCIAL

## 2022-01-31 VITALS — BODY MASS INDEX: 27.06 KG/M2 | HEIGHT: 67 IN | WEIGHT: 172.4 LBS

## 2022-01-31 VITALS — DIASTOLIC BLOOD PRESSURE: 90 MMHG | SYSTOLIC BLOOD PRESSURE: 160 MMHG

## 2022-01-31 DIAGNOSIS — G47.30 SLEEP APNEA, UNSPECIFIED: ICD-10-CM

## 2022-01-31 DIAGNOSIS — D25.9 LEIOMYOMA OF UTERUS, UNSPECIFIED: ICD-10-CM

## 2022-01-31 DIAGNOSIS — J44.9 CHRONIC OBSTRUCTIVE PULMONARY DISEASE, UNSPECIFIED: ICD-10-CM

## 2022-01-31 PROCEDURE — 99214 OFFICE O/P EST MOD 30 MIN: CPT

## 2022-02-04 ENCOUNTER — APPOINTMENT (OUTPATIENT)
Dept: INTERNAL MEDICINE | Facility: CLINIC | Age: 57
End: 2022-02-04
Payer: COMMERCIAL

## 2022-02-04 VITALS — SYSTOLIC BLOOD PRESSURE: 130 MMHG | DIASTOLIC BLOOD PRESSURE: 70 MMHG

## 2022-02-04 PROCEDURE — 99214 OFFICE O/P EST MOD 30 MIN: CPT

## 2022-02-04 NOTE — ASSESSMENT
[FreeTextEntry1] : Problems\par Hypertension\par Tachycardia\par Left breast calcifications\par Her blood pressure today was well controlled.  Her physical examination is normal.  The patient is medically cleared for surgery

## 2022-02-04 NOTE — PLAN
[FreeTextEntry1] : 2/4/22\par She feels well.\par Her BP today was  130/70 . Her pulse rate was 70 .\par She is medically cleared for surgery .

## 2022-02-04 NOTE — ASSESSMENT
[Patient NOT optimized for Surgery at this time] : Patient not optimized for surgery at this time [FreeTextEntry4] : This is a patient with a history of hypertension, sleep apnea, left breast abnormality who is here today for medical clearance.  Unfortunately her blood pressure today was 160/90 taken multiple times.  Her physical examination was normal.  I started her on metoprolol tartrate 25 mg twice daily in addition to her hydrochlorothiazide and nifedipine and she is scheduled to return in 5 days

## 2022-02-04 NOTE — HISTORY OF PRESENT ILLNESS
[No Pertinent Cardiac History] : no history of aortic stenosis, atrial fibrillation, coronary artery disease, recent myocardial infarction, or implantable device/pacemaker [No Adverse Anesthesia Reaction] : no adverse anesthesia reaction in self or family member [Sleep Apnea] : sleep apnea [FreeTextEntry1] : Left breast surgery [FreeTextEntry3] : Dr Dukes [FreeTextEntry4] : This is a 56-year-old patient with a history of hypertension, leiomyoma of the uterus, who was found to have calcifications of the left breast which was biopsied and revealed cellular abnormalities.  She is scheduled for left breast surgery

## 2022-02-04 NOTE — HISTORY OF PRESENT ILLNESS
[de-identified] : This is a 56-year-old patient with a history of hypertension, occasional tachycardia, left breast calcifications for which she will undergo surgery who is here for reevaluation of her blood pressure.  She was seen last week and her blood pressure was markedly elevated.

## 2022-02-07 NOTE — ASU PATIENT PROFILE, ADULT - CAREGIVER PHONE NUMBER
CT abd/pelvis: Large amount of stool within the rectum with surrounding inflammatory change. s/p 3 day course of IV flagyl for Stercoral colitis. However this was discontinued as it appeared to be more of an IBD picture than infectious colitis.   -Bacid TID, continue PPI, carafate, senna at bedtime.  -GI, Dr. Hutchinson following  -possible abd XR tomorrow if syptoms persist (to eval bowel gas pattern) CT abd/pelvis: Large amount of stool within the rectum with surrounding inflammatory change. s/p 3 day course of IV flagyl for Stercoral colitis. However this was discontinued as it appeared to be more of an IBD picture than infectious colitis.   -Bacid TID, continue PPI, carafate, senna at bedtime.  -GI, Dr. Hutchinson following  -will do abd xray to eval for stool pattern as patient is having persistent nausea and diarrhea which is keeping her from participating in PT.  -will give simethicone for upset stomach, metamucil for IBD [phone]

## 2022-02-07 NOTE — ASU PATIENT PROFILE, ADULT - TEACHING/LEARNING RELIGIOUS CONSIDERATIONS
08/19/19 0906   Final Note   Assessment Type Final Discharge Note   Anticipated Discharge Disposition Home   Hospital Follow Up  Appt(s) scheduled? No   Discharge plans and expectations educations in teach back method with documentation complete? Yes   weekend dc   none

## 2022-02-07 NOTE — ASU PATIENT PROFILE, ADULT - NSICDXPASTMEDICALHX_GEN_ALL_CORE_FT
PAST MEDICAL HISTORY:  Hernia umblical    Hypertension     Leiomyoma of uterus    Masses of both breasts     Obese     Palpitations     Seasonal allergies     Spontaneous  x2

## 2022-02-08 ENCOUNTER — RESULT REVIEW (OUTPATIENT)
Age: 57
End: 2022-02-08

## 2022-02-08 ENCOUNTER — OUTPATIENT (OUTPATIENT)
Dept: OUTPATIENT SERVICES | Facility: HOSPITAL | Age: 57
LOS: 1 days | Discharge: HOME | End: 2022-02-08
Payer: COMMERCIAL

## 2022-02-08 ENCOUNTER — APPOINTMENT (OUTPATIENT)
Dept: SURGERY | Facility: HOSPITAL | Age: 57
End: 2022-02-08

## 2022-02-08 VITALS
DIASTOLIC BLOOD PRESSURE: 78 MMHG | OXYGEN SATURATION: 96 % | RESPIRATION RATE: 14 BRPM | HEART RATE: 61 BPM | SYSTOLIC BLOOD PRESSURE: 127 MMHG

## 2022-02-08 VITALS
HEART RATE: 69 BPM | WEIGHT: 169.98 LBS | SYSTOLIC BLOOD PRESSURE: 131 MMHG | HEIGHT: 66 IN | RESPIRATION RATE: 17 BRPM | TEMPERATURE: 98 F | OXYGEN SATURATION: 100 % | DIASTOLIC BLOOD PRESSURE: 73 MMHG

## 2022-02-08 DIAGNOSIS — Z87.59 PERSONAL HISTORY OF OTHER COMPLICATIONS OF PREGNANCY, CHILDBIRTH AND THE PUERPERIUM: Chronic | ICD-10-CM

## 2022-02-08 DIAGNOSIS — R92.8 OTHER ABNORMAL AND INCONCLUSIVE FINDINGS ON DIAGNOSTIC IMAGING OF BREAST: ICD-10-CM

## 2022-02-08 DIAGNOSIS — Z98.89 OTHER SPECIFIED POSTPROCEDURAL STATES: Chronic | ICD-10-CM

## 2022-02-08 DIAGNOSIS — Z90.710 ACQUIRED ABSENCE OF BOTH CERVIX AND UTERUS: Chronic | ICD-10-CM

## 2022-02-08 PROCEDURE — 76098 X-RAY EXAM SURGICAL SPECIMEN: CPT | Mod: 26

## 2022-02-08 PROCEDURE — 88305 TISSUE EXAM BY PATHOLOGIST: CPT | Mod: 26

## 2022-02-08 PROCEDURE — 88307 TISSUE EXAM BY PATHOLOGIST: CPT | Mod: 26

## 2022-02-08 PROCEDURE — 19125K: CUSTOM | Mod: LT

## 2022-02-08 RX ORDER — NIFEDIPINE 30 MG
1 TABLET, EXTENDED RELEASE 24 HR ORAL
Qty: 0 | Refills: 0 | DISCHARGE

## 2022-02-08 RX ORDER — METOPROLOL TARTRATE 50 MG
1 TABLET ORAL
Qty: 0 | Refills: 0 | DISCHARGE

## 2022-02-08 RX ORDER — TRIAMCINOLONE ACETONIDE 55 MCG
1 AEROSOL, SPRAY (GRAM) NASAL
Qty: 0 | Refills: 0 | DISCHARGE

## 2022-02-08 RX ORDER — LEVOCETIRIZINE DIHYDROCHLORIDE 0.5 MG/ML
1 SOLUTION ORAL
Qty: 0 | Refills: 0 | DISCHARGE

## 2022-02-08 NOTE — ASU PREOP CHECKLIST - BSA (M2)
Pt. Resting in bed. Alert/oriented. Vitals and assessment stable as charted. Denies any pain/nausea or any other discomfort at present time. Call light in reach. Will continue to monitor. 1.87

## 2022-02-08 NOTE — ASU DISCHARGE PLAN (ADULT/PEDIATRIC) - CARE PROVIDER_API CALL
Stephanie Dukes)  [facility]  [address], [address]  [place], [place] [id number]  Phone: [phone]  Fax: [phone]  Follow Up Time: 1 week

## 2022-02-08 NOTE — ASU DISCHARGE PLAN (ADULT/PEDIATRIC) - ASU DC SPECIAL INSTRUCTIONSFT
Activity level - Full including driving tomorrow as tolerated. No strenuous activity x1 week. Wear bra as desired or tolerated.    Bathing -  Showering is allowed, do not rub or scrub incision. Pat wound dry.    Medications - For pain, take extra-strength Tylenol every 6 hours as needed. If that doesn't work, you may try Advil 600mg every 6 hours as needed.    Bleeding - After surgery, some blood may escape from under the tape. It is of no consequence. The paper tape may fall off after several days. If not, Dr. Dukes will remove it in her office.    Bruising - As the days go by, black and blue areas may appear more noticeable. These areas will disappear within several weeks. In addition, the area around the incision may feel very hard and look swollen. It is normal and it may take several months to subside.     Please call the office if you experience any bleeding, pain, fever >101F, wound draining.   Please call the office to schedule your outpatient follow up appointment within 1 week of discharge.

## 2022-02-08 NOTE — ASU DISCHARGE PLAN (ADULT/PEDIATRIC) - NS MD DC FALL RISK RISK
For information on Fall & Injury Prevention, visit: [email or web address] OR  [email or web address] OR  https://www.cdc.gov/steadi/patient.html

## 2022-02-10 LAB — SURGICAL PATHOLOGY STUDY: SIGNIFICANT CHANGE UP

## 2022-02-14 ENCOUNTER — APPOINTMENT (OUTPATIENT)
Dept: SURGERY | Facility: CLINIC | Age: 57
End: 2022-02-14
Payer: COMMERCIAL

## 2022-02-14 PROCEDURE — 99024 POSTOP FOLLOW-UP VISIT: CPT

## 2022-02-23 ENCOUNTER — RX CHANGE (OUTPATIENT)
Age: 57
End: 2022-02-23

## 2022-03-22 ENCOUNTER — APPOINTMENT (OUTPATIENT)
Dept: INTERNAL MEDICINE | Facility: CLINIC | Age: 57
End: 2022-03-22
Payer: COMMERCIAL

## 2022-03-22 VITALS — HEIGHT: 67 IN | BODY MASS INDEX: 27 KG/M2 | WEIGHT: 172 LBS

## 2022-03-22 VITALS — SYSTOLIC BLOOD PRESSURE: 130 MMHG | DIASTOLIC BLOOD PRESSURE: 70 MMHG

## 2022-03-22 PROCEDURE — 99214 OFFICE O/P EST MOD 30 MIN: CPT

## 2022-03-22 NOTE — HISTORY OF PRESENT ILLNESS
[de-identified] : This is a 56-year-old patient with a history of hypertension, cervical radiculopathy, breast nodule who is here today for follow-up visit

## 2022-03-22 NOTE — ASSESSMENT
[FreeTextEntry1] : Problems\par Hypertension-the patient's blood pressure is well controlled on metoprolol 25 mg twice daily and also hydrochlorothiazide.  I advised the patient to continue her low-sodium diet exercise and lose weight\par Numbness of both hands-reviewed the patient's MRI from 2 years ago reveals significant osteoarthritis of the cervical spine with also cervical disc disease and foraminal stenosis.  I referred the patient to neurology and to physical therapy\par Breast calcifications-the biopsy revealed

## 2022-04-29 ENCOUNTER — APPOINTMENT (OUTPATIENT)
Dept: INTERNAL MEDICINE | Facility: CLINIC | Age: 57
End: 2022-04-29
Payer: COMMERCIAL

## 2022-04-29 PROCEDURE — 99213 OFFICE O/P EST LOW 20 MIN: CPT | Mod: 95

## 2022-04-29 RX ORDER — ALBUTEROL SULFATE 90 UG/1
108 (90 BASE) INHALANT RESPIRATORY (INHALATION)
Qty: 1 | Refills: 3 | Status: DISCONTINUED | COMMUNITY
Start: 2019-02-15 | End: 2022-04-29

## 2022-04-29 NOTE — HISTORY OF PRESENT ILLNESS
[Home] : at home, [unfilled] , at the time of the visit. [Medical Office: ([facility])___] : at the medical office located in  [Verbal consent obtained from patient] : the patient, [unfilled] [FreeTextEntry8] : 56 year old female with a hx of htn now complaining of 10 days of cough with thick yellow phlegm. She had chills and body aches as well but no documented fever.  She has been taking theraflu.  She took two home covid tests, both negative. \par She is not SOB. \par

## 2022-04-29 NOTE — ASSESSMENT
[FreeTextEntry1] : Due to nature of visit, full physical exam unable to be done to assess patient.\par \par URI:\par ABX\par Use albuterol as needed \par flonase \par OTC meds\par rest, fluids, \par report any worsening\par Recommend supportive care including antipyretics, fluids, OTC cough/cold medications if age-appropriate, and nasal saline followed by nasal suction. Return if symptoms worsen or persist.\par

## 2022-06-16 ENCOUNTER — EMERGENCY (EMERGENCY)
Facility: HOSPITAL | Age: 57
LOS: 1 days | Discharge: HOME | End: 2022-06-16
Attending: EMERGENCY MEDICINE
Payer: COMMERCIAL

## 2022-06-16 VITALS
SYSTOLIC BLOOD PRESSURE: 163 MMHG | TEMPERATURE: 98 F | RESPIRATION RATE: 16 BRPM | DIASTOLIC BLOOD PRESSURE: 89 MMHG | OXYGEN SATURATION: 99 % | HEART RATE: 66 BPM

## 2022-06-16 VITALS
TEMPERATURE: 98 F | RESPIRATION RATE: 18 BRPM | HEART RATE: 92 BPM | SYSTOLIC BLOOD PRESSURE: 167 MMHG | OXYGEN SATURATION: 100 % | WEIGHT: 164.91 LBS | HEIGHT: 66 IN | DIASTOLIC BLOOD PRESSURE: 88 MMHG

## 2022-06-16 DIAGNOSIS — Z98.89 OTHER SPECIFIED POSTPROCEDURAL STATES: Chronic | ICD-10-CM

## 2022-06-16 DIAGNOSIS — Z87.59 PERSONAL HISTORY OF OTHER COMPLICATIONS OF PREGNANCY, CHILDBIRTH AND THE PUERPERIUM: Chronic | ICD-10-CM

## 2022-06-16 DIAGNOSIS — Z90.710 ACQUIRED ABSENCE OF BOTH CERVIX AND UTERUS: Chronic | ICD-10-CM

## 2022-06-16 LAB
ALBUMIN SERPL ELPH-MCNC: 4.8 G/DL — SIGNIFICANT CHANGE UP (ref 3.3–5)
ALP SERPL-CCNC: 75 U/L — SIGNIFICANT CHANGE UP (ref 40–120)
ALT FLD-CCNC: 12 U/L — SIGNIFICANT CHANGE UP (ref 10–45)
ANION GAP SERPL CALC-SCNC: 13 MMOL/L — SIGNIFICANT CHANGE UP (ref 5–17)
AST SERPL-CCNC: 27 U/L — SIGNIFICANT CHANGE UP (ref 10–40)
BASOPHILS # BLD AUTO: 0.03 K/UL — SIGNIFICANT CHANGE UP (ref 0–0.2)
BASOPHILS NFR BLD AUTO: 0.6 % — SIGNIFICANT CHANGE UP (ref 0–2)
BILIRUB SERPL-MCNC: 0.4 MG/DL — SIGNIFICANT CHANGE UP (ref 0.2–1.2)
BUN SERPL-MCNC: 18 MG/DL — SIGNIFICANT CHANGE UP (ref 7–23)
CALCIUM SERPL-MCNC: 9.8 MG/DL — SIGNIFICANT CHANGE UP (ref 8.4–10.5)
CHLORIDE SERPL-SCNC: 102 MMOL/L — SIGNIFICANT CHANGE UP (ref 96–108)
CO2 SERPL-SCNC: 25 MMOL/L — SIGNIFICANT CHANGE UP (ref 22–31)
CREAT SERPL-MCNC: 0.93 MG/DL — SIGNIFICANT CHANGE UP (ref 0.5–1.3)
EGFR: 73 ML/MIN/1.73M2 — SIGNIFICANT CHANGE UP
EOSINOPHIL # BLD AUTO: 0.48 K/UL — SIGNIFICANT CHANGE UP (ref 0–0.5)
EOSINOPHIL NFR BLD AUTO: 9 % — HIGH (ref 0–6)
GLUCOSE SERPL-MCNC: 96 MG/DL — SIGNIFICANT CHANGE UP (ref 70–99)
HCT VFR BLD CALC: 37.5 % — SIGNIFICANT CHANGE UP (ref 34.5–45)
HGB BLD-MCNC: 12.6 G/DL — SIGNIFICANT CHANGE UP (ref 11.5–15.5)
IMM GRANULOCYTES NFR BLD AUTO: 0.2 % — SIGNIFICANT CHANGE UP (ref 0–1.5)
LYMPHOCYTES # BLD AUTO: 2.44 K/UL — SIGNIFICANT CHANGE UP (ref 1–3.3)
LYMPHOCYTES # BLD AUTO: 46 % — HIGH (ref 13–44)
MCHC RBC-ENTMCNC: 31.7 PG — SIGNIFICANT CHANGE UP (ref 27–34)
MCHC RBC-ENTMCNC: 33.6 GM/DL — SIGNIFICANT CHANGE UP (ref 32–36)
MCV RBC AUTO: 94.5 FL — SIGNIFICANT CHANGE UP (ref 80–100)
MONOCYTES # BLD AUTO: 0.31 K/UL — SIGNIFICANT CHANGE UP (ref 0–0.9)
MONOCYTES NFR BLD AUTO: 5.8 % — SIGNIFICANT CHANGE UP (ref 2–14)
NEUTROPHILS # BLD AUTO: 2.04 K/UL — SIGNIFICANT CHANGE UP (ref 1.8–7.4)
NEUTROPHILS NFR BLD AUTO: 38.4 % — LOW (ref 43–77)
NRBC # BLD: 0 /100 WBCS — SIGNIFICANT CHANGE UP (ref 0–0)
PLATELET # BLD AUTO: 214 K/UL — SIGNIFICANT CHANGE UP (ref 150–400)
POTASSIUM SERPL-MCNC: 4.1 MMOL/L — SIGNIFICANT CHANGE UP (ref 3.5–5.3)
POTASSIUM SERPL-SCNC: 4.1 MMOL/L — SIGNIFICANT CHANGE UP (ref 3.5–5.3)
PROT SERPL-MCNC: 8.3 G/DL — SIGNIFICANT CHANGE UP (ref 6–8.3)
RBC # BLD: 3.97 M/UL — SIGNIFICANT CHANGE UP (ref 3.8–5.2)
RBC # FLD: 13.9 % — SIGNIFICANT CHANGE UP (ref 10.3–14.5)
SODIUM SERPL-SCNC: 140 MMOL/L — SIGNIFICANT CHANGE UP (ref 135–145)
TROPONIN T, HIGH SENSITIVITY RESULT: <6 NG/L — SIGNIFICANT CHANGE UP (ref 0–51)
WBC # BLD: 5.31 K/UL — SIGNIFICANT CHANGE UP (ref 3.8–10.5)
WBC # FLD AUTO: 5.31 K/UL — SIGNIFICANT CHANGE UP (ref 3.8–10.5)

## 2022-06-16 PROCEDURE — 80053 COMPREHEN METABOLIC PANEL: CPT

## 2022-06-16 PROCEDURE — 84484 ASSAY OF TROPONIN QUANT: CPT

## 2022-06-16 PROCEDURE — 93010 ELECTROCARDIOGRAM REPORT: CPT

## 2022-06-16 PROCEDURE — 99285 EMERGENCY DEPT VISIT HI MDM: CPT | Mod: 25

## 2022-06-16 PROCEDURE — 93005 ELECTROCARDIOGRAM TRACING: CPT

## 2022-06-16 PROCEDURE — 85025 COMPLETE CBC W/AUTO DIFF WBC: CPT

## 2022-06-16 PROCEDURE — 99285 EMERGENCY DEPT VISIT HI MDM: CPT

## 2022-06-16 PROCEDURE — U0003: CPT

## 2022-06-16 PROCEDURE — U0005: CPT

## 2022-06-16 PROCEDURE — 36415 COLL VENOUS BLD VENIPUNCTURE: CPT

## 2022-06-16 PROCEDURE — 71046 X-RAY EXAM CHEST 2 VIEWS: CPT

## 2022-06-16 PROCEDURE — 71046 X-RAY EXAM CHEST 2 VIEWS: CPT | Mod: 26

## 2022-06-16 RX ORDER — ASPIRIN/CALCIUM CARB/MAGNESIUM 324 MG
162 TABLET ORAL ONCE
Refills: 0 | Status: COMPLETED | OUTPATIENT
Start: 2022-06-16 | End: 2022-06-16

## 2022-06-16 RX ADMIN — Medication 162 MILLIGRAM(S): at 22:30

## 2022-06-16 NOTE — ED PROVIDER NOTE - PATIENT PORTAL LINK FT
You can access the FollowMyHealth Patient Portal offered by [facility] by registering at the following website: [email or web address]. By joining [employer]’s FollowMyHealth portal, you will also be able to view your health information using other applications (apps) compatible with our system.

## 2022-06-16 NOTE — ED PROVIDER NOTE - CLINICAL SUMMARY MEDICAL DECISION MAKING FREE TEXT BOX
Two weeks of non exertional chest pain with normal EKG, unremarkable exam, suspicion for PE low by history and physical - plan for labs, troponin to r/o ACS, CXR, ASA and re-evaluate.  Has outpatient cardiologist for follow up if Emergency Department work up negative.

## 2022-06-16 NOTE — ED PROVIDER NOTE - ATTENDING CONTRIBUTION TO CARE
Patient seen and evaluated with resident/NP/PA, however HPI, ROS, PE and MDM as documented authored by myself unless otherwise noted- Joseph Obregon MD

## 2022-06-16 NOTE — ED PROVIDER NOTE - CARE PROVIDER_API CALL
Brody Mock  CARDIOVASCULAR DISEASE  [address]  [place] [address]  Phone: [phone]  Fax: [phone]  Follow Up Time: 7-10 Days

## 2022-06-16 NOTE — ED ADULT NURSE NOTE - OBJECTIVE STATEMENT
55 yo female presents to ED complaining of chest tightness. Pt states 55 yo female PMH HTN presents to ED complaining of chest tightness x 2 weeks. States pain is intermittent, and not worsened with activity. States she has also  been experiencing hot flashes, along with tingling to left face, arm, and leg. Pt is ambulatory and well- appearing, breathing spontaneous and unlabored on room air, connected to cardiac monitor- NSR 70s. Abdomen soft, nontender, nondistended. Bed locked and lowered. Comfort and safety maintained. Call bell within reach. 55 yo female PMH HTN presents to ED complaining of chest tightness x 2 weeks. States pain is intermittent, and not worsened with activity. States she has also  been experiencing hot flashes, along with tingling to left face, arm, and leg. Pt is ambulatory and well- appearing, breathing spontaneous and unlabored on room air, connected to cardiac monitor- NSR 70s. Abdomen soft, nontender, nondistended. Gross neuro in tact, spontaneously moving all extremities, gait steady. Denies SOB, back pain, n/v/d, changes in vision. Bed locked and lowered. Comfort and safety maintained. Call bell within reach.

## 2022-06-16 NOTE — ED PROVIDER NOTE - NSFOLLOWUPINSTRUCTIONS_ED_ALL_ED_FT
You were evaluated in the emergency department for chest pain. Your results are attached.    Please follow up with your cardiologist.     Return with new or worsening symptoms, including:  -Severe, crushing chest pain  -Shortness of breath  -High fevers  -New swelling in the legs  -You faint    [facility]  General Internal Medicine  [address] [place] [address]  Phone: [phone]  Fax:     [facility]  Internal Medicine  [address] [place] [address]  Phone: [phone]  Fax: [phone]    [facility]  Cardiology  [address] [place] [id number]  Phone: [phone]    Chest Pain  WHAT YOU NEED TO KNOW:  Chest pain can be caused by a range of conditions, from not serious to life-threatening. Chest pain can be a symptom of a digestive problem, such as acid reflux or a stomach ulcer. An anxiety attack or a strong emotion, such as anger, can also cause chest pain. Infection, inflammation, or a fracture in the bones or cartilage in your chest can cause pain or discomfort. Sometimes chest pain or pressure is caused by poor blood flow to your heart (angina). Chest pain may also be caused by life-threatening conditions such as a heart attack or blood clot in your lungs.   DISCHARGE INSTRUCTIONS:  Call 911 if:   •You have any of the following signs of a heart attack: ?Squeezing, pressure, or pain in your chest  ?You may also have any of the following: ?Discomfort or pain in your back, neck, jaw, stomach, or arm  ?Shortness of breath  ?Nausea or vomiting  ?Lightheadedness or a sudden cold sweat  Seek care immediately if:   •You have chest discomfort that gets worse, even with medicine.  •You cough or vomit blood.   •Your bowel movements are black or bloody.   •You cannot stop vomiting, or it hurts to swallow.   Contact your healthcare provider if:   •You have questions or concerns about your condition or care.  Medicines:   •Medicines may be given to treat the cause of your chest pain. Examples include pain medicine, anxiety medicine, or medicines to increase blood flow to your heart.   •Do not take certain medicines without asking your healthcare provider first. These include NSAIDs, herbal or vitamin supplements, or hormones (estrogen or progestin).   •Take your medicine as directed. Contact your healthcare provider if you think your medicine is not helping or if you have side effects. Tell him or her if you are allergic to any medicine. Keep a list of the medicines, vitamins, and herbs you take. Include the amounts, and when and why you take them. Bring the list or the pill bottles to follow-up visits. Carry your medicine list with you in case of an emergency.  Follow up with your healthcare provider within 72 hours, or as directed: You may need to return for more tests to find the cause of your chest pain. You may be referred to a specialist, such as a cardiologist or gastroenterologist. Write down your questions so you remember to ask them during your visits.   Healthy living tips: The following are general healthy guidelines. If your chest pain is caused by a heart problem, your healthcare provider will give you specific guidelines to follow.  •Do not smoke. Nicotine and other chemicals in cigarettes and cigars can cause lung and heart damage. Ask your healthcare provider for information if you currently smoke and need help to quit. E-cigarettes or smokeless tobacco still contain nicotine. Talk to your healthcare provider before you use these products.   •Eat a variety of healthy, low-fat, low-salt foods. Healthy foods include fruits, vegetables, whole-grain breads, low-fat dairy products, beans, lean meats, and fish. Ask for more information about a heart healthy diet.  •Drink plenty of water every day. Your body is made of mostly water. Water helps your body to control temperature and blood pressure. Ask your healthcare provider how much water you should drink every day.  •Ask about activity. Your healthcare provider will tell you which activities to limit or avoid. Ask when you can drive, return to work, and have sex. Ask about the best exercise plan for you.  •Maintain a healthy weight. Ask your healthcare provider how much you should weigh. Ask him or her to help you create a weight loss plan if you are overweight.   If you have a stent:   •Carry your stent card with you at all times.   •Let all healthcare providers know that you have a stent.

## 2022-06-17 LAB — SARS-COV-2 RNA SPEC QL NAA+PROBE: SIGNIFICANT CHANGE UP

## 2022-07-15 ENCOUNTER — RX RENEWAL (OUTPATIENT)
Age: 57
End: 2022-07-15

## 2022-07-19 ENCOUNTER — NON-APPOINTMENT (OUTPATIENT)
Age: 57
End: 2022-07-19

## 2022-07-19 ENCOUNTER — APPOINTMENT (OUTPATIENT)
Dept: GASTROENTEROLOGY | Facility: CLINIC | Age: 57
End: 2022-07-19

## 2022-07-19 VITALS
WEIGHT: 172 LBS | DIASTOLIC BLOOD PRESSURE: 98 MMHG | SYSTOLIC BLOOD PRESSURE: 178 MMHG | BODY MASS INDEX: 27 KG/M2 | HEART RATE: 75 BPM | HEIGHT: 67 IN

## 2022-07-19 DIAGNOSIS — Z80.0 FAMILY HISTORY OF MALIGNANT NEOPLASM OF DIGESTIVE ORGANS: ICD-10-CM

## 2022-07-19 DIAGNOSIS — K21.9 GASTRO-ESOPHAGEAL REFLUX DISEASE W/OUT ESOPHAGITIS: ICD-10-CM

## 2022-07-19 DIAGNOSIS — R10.13 EPIGASTRIC PAIN: ICD-10-CM

## 2022-07-19 DIAGNOSIS — Z83.71 FAMILY HISTORY OF COLONIC POLYPS: ICD-10-CM

## 2022-07-19 DIAGNOSIS — U07.1 COVID-19: ICD-10-CM

## 2022-07-19 PROCEDURE — 99204 OFFICE O/P NEW MOD 45 MIN: CPT

## 2022-07-19 PROCEDURE — 82274 ASSAY TEST FOR BLOOD FECAL: CPT | Mod: QW

## 2022-07-19 RX ORDER — LINACLOTIDE 145 UG/1
145 CAPSULE, GELATIN COATED ORAL DAILY
Qty: 30 | Refills: 1 | Status: DISCONTINUED | COMMUNITY
Start: 2020-05-22 | End: 2022-07-19

## 2022-07-19 RX ORDER — WHEAT DEXTRIN 3 G/4 G
POWDER (GRAM) ORAL
Qty: 1 | Refills: 0 | Status: DISCONTINUED | COMMUNITY
Start: 2020-05-12 | End: 2022-07-19

## 2022-07-19 RX ORDER — TRAZODONE HYDROCHLORIDE 50 MG/1
50 TABLET ORAL
Qty: 90 | Refills: 0 | Status: DISCONTINUED | COMMUNITY
Start: 2020-05-05 | End: 2022-07-19

## 2022-07-19 RX ORDER — OMEPRAZOLE 20 MG/1
20 CAPSULE, DELAYED RELEASE ORAL
Qty: 30 | Refills: 3 | Status: DISCONTINUED | COMMUNITY
Start: 2020-05-12 | End: 2022-07-19

## 2022-07-19 RX ORDER — STANDARDIZED SENNA CONCENTRATE AND DOCUSATE SODIUM 8.6; 5 MG/1; MG/1
8.6-5 TABLET ORAL
Qty: 30 | Refills: 0 | Status: DISCONTINUED | COMMUNITY
Start: 2020-05-12 | End: 2022-07-19

## 2022-07-19 RX ORDER — POLYETHYLENE GLYCOL 3350 17 G/17G
17 POWDER, FOR SOLUTION ORAL DAILY
Qty: 1 | Refills: 3 | Status: DISCONTINUED | COMMUNITY
Start: 2020-05-12 | End: 2022-07-19

## 2022-07-19 RX ORDER — ONDANSETRON 4 MG/1
4 TABLET, ORALLY DISINTEGRATING ORAL
Qty: 9 | Refills: 0 | Status: DISCONTINUED | COMMUNITY
Start: 2020-04-30 | End: 2022-07-19

## 2022-07-19 RX ORDER — AZITHROMYCIN 250 MG/1
250 TABLET, FILM COATED ORAL
Qty: 1 | Refills: 0 | Status: DISCONTINUED | COMMUNITY
Start: 2022-04-29 | End: 2022-07-19

## 2022-07-28 ENCOUNTER — APPOINTMENT (OUTPATIENT)
Dept: INTERNAL MEDICINE | Facility: CLINIC | Age: 57
End: 2022-07-28

## 2022-08-08 NOTE — ED ADULT NURSE NOTE - NS_NURSE_DISC_TEACHING_YN_ED_ALL_ED
12/04/17 0826   Rehab Treatment   Discipline physical therapy assistant   Treatment Not Performed patient unavailable for treatment  (Pt going off floor for testing this am. Will check back in pm. Notified BEV Roe)   Recommendation   PT - Next Appointment 12/04/17      Yes 08-Aug-2022 17:38

## 2022-08-23 ENCOUNTER — APPOINTMENT (OUTPATIENT)
Dept: INTERNAL MEDICINE | Facility: CLINIC | Age: 57
End: 2022-08-23

## 2022-09-13 ENCOUNTER — RX RENEWAL (OUTPATIENT)
Age: 57
End: 2022-09-13

## 2022-09-15 ENCOUNTER — APPOINTMENT (OUTPATIENT)
Dept: RADIOLOGY | Facility: CLINIC | Age: 57
End: 2022-09-15

## 2022-09-15 ENCOUNTER — APPOINTMENT (OUTPATIENT)
Dept: INTERNAL MEDICINE | Facility: CLINIC | Age: 57
End: 2022-09-15

## 2022-09-15 ENCOUNTER — OUTPATIENT (OUTPATIENT)
Dept: OUTPATIENT SERVICES | Facility: HOSPITAL | Age: 57
LOS: 1 days | End: 2022-09-15
Payer: COMMERCIAL

## 2022-09-15 VITALS — WEIGHT: 175 LBS | BODY MASS INDEX: 27.41 KG/M2

## 2022-09-15 VITALS — DIASTOLIC BLOOD PRESSURE: 78 MMHG | SYSTOLIC BLOOD PRESSURE: 130 MMHG

## 2022-09-15 DIAGNOSIS — M70.70 OTHER BURSITIS OF HIP, UNSPECIFIED HIP: ICD-10-CM

## 2022-09-15 DIAGNOSIS — Z87.59 PERSONAL HISTORY OF OTHER COMPLICATIONS OF PREGNANCY, CHILDBIRTH AND THE PUERPERIUM: Chronic | ICD-10-CM

## 2022-09-15 DIAGNOSIS — Z98.89 OTHER SPECIFIED POSTPROCEDURAL STATES: Chronic | ICD-10-CM

## 2022-09-15 DIAGNOSIS — R92.1 MAMMOGRAPHIC CALCIFICATION FOUND ON DIAGNOSTIC IMAGING OF BREAST: ICD-10-CM

## 2022-09-15 DIAGNOSIS — Z90.710 ACQUIRED ABSENCE OF BOTH CERVIX AND UTERUS: Chronic | ICD-10-CM

## 2022-09-15 PROCEDURE — 99214 OFFICE O/P EST MOD 30 MIN: CPT

## 2022-09-15 PROCEDURE — 73502 X-RAY EXAM HIP UNI 2-3 VIEWS: CPT

## 2022-09-15 PROCEDURE — 73502 X-RAY EXAM HIP UNI 2-3 VIEWS: CPT | Mod: 26,LT

## 2022-09-15 NOTE — ASSESSMENT
[FreeTextEntry1] : Problems\par Hypertension-she presently is on nifedipine 90 mg daily.  I informed the patient that one of the side effects of this dose is that she may notice pedal edema in addition she may develop gingival hyperplasia and I recommended that she follow-up with a dentist twice yearly\par Chest pains-she is scheduled for CT coronary angiogram\par Headaches-she is scheduled for an MRI\par Mood disorder-I started her on Zoloft 25 mg daily and advised her to return in 1 month

## 2022-09-15 NOTE — HISTORY OF PRESENT ILLNESS
[de-identified] : This is a 57-year-old patient who has a history of hypertension and sleep apnea who developed substernal chest pain radiating to her arm which prompted her to go to the emergency room where cardiac enzymes and an EKG were performed which were normal and she was discharged to the care of her cardiologist.  At that time her blood pressure was high and her Nifedical was increased to 90 mg daily in conjunction with her diuretic and beta-blocker.  The patient is scheduled for CT coronary angiogram.  In addition she has been having recurrent headaches and is due for an MRI of the head.  In addition she is complaining of increased anxiety

## 2022-09-15 NOTE — PHYSICAL EXAM
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 adhesions [N73.6]; Intramural and submucous leiomyoma of uterus [D25.1, D25.0]; Menorrhagia with irregular cycle,  RECORDS RECEIVED FROM: [facility]   DATE RECEIVED: 9/20/2022   NOTES STATUS DETAILS   OFFICE NOTE from referring provider Complete Epic   ref by Dr MARION Kang,   DISCHARGE SUMMARY from hospital     DISCHARGE REPORT from the ER     OPERATIVE REPORT     MEDICATION LIST Complete [facility]   CLINICAL TRIAL TREATMENTS TO DATE     LABS     PATHOLOGY REPORTS     ANYTHING RELATED TO DIAGNOSIS Complete Labs last updated on 8/9/2022   GENONOMIC TESTING     TYPE:     IMAGING (NEED IMAGES & REPORT)     CT SCANS     MRI     MAMMO     ULTRASOUND Complete US Pelvic Transabdominal and Transvaginal 1/21/2022   PET          [No Acute Distress] : no acute distress [Normal Oropharynx] : normal oropharynx [Normal Appearance] : normal appearance [No Neck Mass] : no neck mass [Normal Rate/Rhythm] : normal rate/rhythm [Normal S1, S2] : normal s1, s2 [No Murmurs] : no murmurs [No Resp Distress] : no resp distress [No Acc Muscle Use] : no acc muscle use [Normal Palpation] : normal palpation [Normal Rhythm and Effort] : normal rhythm and effort [Not Tender] : not tender [Benign] : benign [Soft] : soft [No HSM] : no hsm [Normal Bowel Sounds] : normal bowel sounds [No Clubbing] : no clubbing [Normal Gait] : normal gait [No Cyanosis] : no cyanosis [Normal Color/ Pigmentation] : normal color/ pigmentation [No Rash] : no rash [No Focal Deficits] : no focal deficits [Oriented x3] : oriented x3 [Normal Affect] : normal affect [TextBox_68] : no  resp distress , able to talk in full sentence

## 2022-09-16 DIAGNOSIS — M54.50 LOW BACK PAIN, UNSPECIFIED: ICD-10-CM

## 2022-09-19 DIAGNOSIS — Z01.818 ENCOUNTER FOR OTHER PREPROCEDURAL EXAMINATION: ICD-10-CM

## 2022-09-21 ENCOUNTER — APPOINTMENT (OUTPATIENT)
Dept: CT IMAGING | Facility: CLINIC | Age: 57
End: 2022-09-21

## 2022-09-21 PROCEDURE — 75574 CT ANGIO HRT W/3D IMAGE: CPT

## 2022-09-27 ENCOUNTER — OUTPATIENT (OUTPATIENT)
Dept: OUTPATIENT SERVICES | Facility: HOSPITAL | Age: 57
LOS: 1 days | End: 2022-09-27

## 2022-09-27 ENCOUNTER — APPOINTMENT (OUTPATIENT)
Dept: MRI IMAGING | Facility: CLINIC | Age: 57
End: 2022-09-27

## 2022-09-27 DIAGNOSIS — Z98.89 OTHER SPECIFIED POSTPROCEDURAL STATES: Chronic | ICD-10-CM

## 2022-09-27 DIAGNOSIS — Z90.710 ACQUIRED ABSENCE OF BOTH CERVIX AND UTERUS: Chronic | ICD-10-CM

## 2022-09-27 DIAGNOSIS — Z87.59 PERSONAL HISTORY OF OTHER COMPLICATIONS OF PREGNANCY, CHILDBIRTH AND THE PUERPERIUM: Chronic | ICD-10-CM

## 2022-09-27 DIAGNOSIS — Z00.8 ENCOUNTER FOR OTHER GENERAL EXAMINATION: ICD-10-CM

## 2022-09-30 LAB — SARS-COV-2 N GENE NPH QL NAA+PROBE: NOT DETECTED

## 2022-10-03 ENCOUNTER — LABORATORY RESULT (OUTPATIENT)
Age: 57
End: 2022-10-03

## 2022-10-03 ENCOUNTER — APPOINTMENT (OUTPATIENT)
Dept: GASTROENTEROLOGY | Facility: CLINIC | Age: 57
End: 2022-10-03

## 2022-10-03 PROCEDURE — 45378 DIAGNOSTIC COLONOSCOPY: CPT

## 2022-10-03 PROCEDURE — 43239 EGD BIOPSY SINGLE/MULTIPLE: CPT | Mod: 59

## 2022-10-03 NOTE — REVIEW OF SYSTEMS
[Feeling Poorly] : feeling poorly [Red Eyes] : red eyes [Hoarseness] : hoarseness [Chest Pain] : chest pain [Palpitations] : palpitations [Abdominal Pain] : abdominal pain [Constipation] : constipation [Heartburn] : heartburn [Hot Flashes] : hot flashes [Negative] : Heme/Lymph [As Noted in HPI] : as noted in HPI [FreeTextEntry5] : Leg swelling, Leg pain [FreeTextEntry7] : Pt constantly digestive issues, feels like air bubbles, pain in chest in throat, acid reflux

## 2022-10-03 NOTE — CONSULT LETTER
[Dear  ___] : Dear  [unfilled], [Consult Letter:] : I had the pleasure of evaluating your patient, [unfilled]. [Please see my note below.] : Please see my note below. [Consult Closing:] : Thank you very much for allowing me to participate in the care of this patient.  If you have any questions, please do not hesitate to contact me. [Sincerely,] : Sincerely, [FreeTextEntry3] : Hugo Tidwell M.D.\par

## 2022-10-03 NOTE — HISTORY OF PRESENT ILLNESS
[FreeTextEntry1] : Tonja reports frequent retrosternal tightness radiating to the neck, generally after meals, with sensation of "belching bubbles."  She also reports some epigastric discomfort.  She would take Pepto-Bismol or gris, and feel better.  She was seen in [facility] ED several weeks ago, with acute cardiac etiology ruled out, to be seen by Cardiology in the near future.  From time to time, she notes constipation and some dark stools.  EGD in the remote past was reportedly negative.  She has undergone a couple of colonoscopies (Dr. Chamberlain), with polyps removed at first, none reportedly seen on the more recent colonoscopy five years ago.  Her father was diagnosed with colonic polyps at about age 70, and her paternal grandmother  of colon cancer at age 79.

## 2022-10-03 NOTE — PHYSICAL EXAM
[General Appearance - Alert] : alert [General Appearance - In No Acute Distress] : in no acute distress [General Appearance - Well Nourished] : well nourished [General Appearance - Well Developed] : well developed [Sclera] : the sclera and conjunctiva were normal [Neck Appearance] : the appearance of the neck was normal [Neck Cervical Mass (___cm)] : no neck mass was observed [Jugular Venous Distention Increased] : there was no jugular-venous distention [Thyroid Diffuse Enlargement] : the thyroid was not enlarged [Thyroid Nodule] : there were no palpable thyroid nodules [Auscultation Breath Sounds / Voice Sounds] : lungs were clear to auscultation bilaterally [Heart Rate And Rhythm] : heart rate was normal and rhythm regular [Heart Sounds] : normal S1 and S2 [Heart Sounds Gallop] : no gallops [Heart Sounds Pericardial Friction Rub] : no pericardial rub [Full Pulse] : the pedal pulses are present [Edema] : there was no peripheral edema [Bowel Sounds] : normal bowel sounds [Abdomen Soft] : soft [Abdomen Tenderness] : non-tender [Abdomen Mass (___ Cm)] : no abdominal mass palpated [Normal Sphincter Tone] : normal sphincter tone [No Rectal Mass] : no rectal mass [Internal Hemorrhoid] : internal hemorrhoids [External Hemorrhoid] : external hemorrhoids [Cervical Lymph Nodes Enlarged Posterior Bilaterally] : posterior cervical [Cervical Lymph Nodes Enlarged Anterior Bilaterally] : anterior cervical [Supraclavicular Lymph Nodes Enlarged Bilaterally] : supraclavicular [Inguinal Lymph Nodes Enlarged Bilaterally] : inguinal [Nail Clubbing] : no clubbing  or cyanosis of the fingernails [Musculoskeletal - Swelling] : no joint swelling seen [Skin Color & Pigmentation] : normal skin color and pigmentation [Skin Turgor] : normal skin turgor [] : no rash [Oriented To Time, Place, And Person] : oriented to person, place, and time [Impaired Insight] : insight and judgment were intact [Affect] : the affect was normal [Occult Blood Positive] : stool was negative for occult blood [FreeTextEntry1] : FIT negative

## 2022-10-03 NOTE — ASSESSMENT
[FreeTextEntry1] : 1.  Atypical chest pain, GERD, eructation, bloating, epigastric discomfort, with reportedly negative EGD in the remote past--suspect reflux but must rule out concomitant cardiac etiology.  Possible erosive and/or eosinophilic esophagitis, ulcer, Helicobacter, etc.\par 2.  History of colonic polyps, last colonoscopy approximately 2017; family history of colon polyps (father) and colon cancer (paternal grandmother)--rule out metachronous colorectal neoplasia.  Occasional constipation and pain, bloating suspicious for underlying IBS-C; medications could also be contributing to some fraction of her GI symptoms.\par 3.  Overweight.\par 4.  Hypertension.\par 5.  Prediabetes.\par 6.  Ex-smoker.\par 7.  History of anxiety.\par 8.  Mild mitral regurgitation.\par 9.  DJD of spine.\par 10.  Status post myomectomy, partial hysterectomy, left breast biopsies, umbilical herniorrhaphy.\par 11.  Status post COVID-19 March 2020.\par \par Plan:\par 1.  [employer] records reviewed.\par 2.  Medical record release for Dr. Chamberlain.\par 3.  Proceed with repeat panendoscopy after cardiac evaluation has been completed. Procedures, rationale, alternatives, material risks, anesthesia plan, and MiraLAX prep instructions were reviewed and brochures given.\par 4.  Probable PPI for some length of time almost regardless of the EGD results.\par

## 2022-10-04 ENCOUNTER — NON-APPOINTMENT (OUTPATIENT)
Age: 57
End: 2022-10-04

## 2022-10-06 ENCOUNTER — NON-APPOINTMENT (OUTPATIENT)
Age: 57
End: 2022-10-06

## 2022-10-11 ENCOUNTER — APPOINTMENT (OUTPATIENT)
Dept: INTERNAL MEDICINE | Facility: CLINIC | Age: 57
End: 2022-10-11

## 2022-11-05 ENCOUNTER — APPOINTMENT (OUTPATIENT)
Dept: RADIOLOGY | Facility: CLINIC | Age: 57
End: 2022-11-05

## 2022-11-05 ENCOUNTER — APPOINTMENT (OUTPATIENT)
Dept: MRI IMAGING | Facility: CLINIC | Age: 57
End: 2022-11-05

## 2022-11-05 ENCOUNTER — RESULT REVIEW (OUTPATIENT)
Age: 57
End: 2022-11-05

## 2022-11-05 ENCOUNTER — OUTPATIENT (OUTPATIENT)
Dept: OUTPATIENT SERVICES | Facility: HOSPITAL | Age: 57
LOS: 1 days | End: 2022-11-05
Payer: COMMERCIAL

## 2022-11-05 DIAGNOSIS — Z90.710 ACQUIRED ABSENCE OF BOTH CERVIX AND UTERUS: Chronic | ICD-10-CM

## 2022-11-05 DIAGNOSIS — Z00.8 ENCOUNTER FOR OTHER GENERAL EXAMINATION: ICD-10-CM

## 2022-11-05 DIAGNOSIS — Z98.89 OTHER SPECIFIED POSTPROCEDURAL STATES: Chronic | ICD-10-CM

## 2022-11-05 DIAGNOSIS — Z87.59 PERSONAL HISTORY OF OTHER COMPLICATIONS OF PREGNANCY, CHILDBIRTH AND THE PUERPERIUM: Chronic | ICD-10-CM

## 2022-11-05 DIAGNOSIS — M54.50 LOW BACK PAIN, UNSPECIFIED: ICD-10-CM

## 2022-11-05 PROCEDURE — 70551 MRI BRAIN STEM W/O DYE: CPT | Mod: 26

## 2022-11-05 PROCEDURE — 72110 X-RAY EXAM L-2 SPINE 4/>VWS: CPT | Mod: 26

## 2022-11-05 PROCEDURE — 70551 MRI BRAIN STEM W/O DYE: CPT

## 2022-11-05 PROCEDURE — 72110 X-RAY EXAM L-2 SPINE 4/>VWS: CPT

## 2022-11-08 ENCOUNTER — TRANSCRIPTION ENCOUNTER (OUTPATIENT)
Age: 57
End: 2022-11-08

## 2022-11-17 ENCOUNTER — RX CHANGE (OUTPATIENT)
Age: 57
End: 2022-11-17

## 2022-12-08 ENCOUNTER — APPOINTMENT (OUTPATIENT)
Dept: MAMMOGRAPHY | Facility: CLINIC | Age: 57
End: 2022-12-08

## 2022-12-08 ENCOUNTER — OUTPATIENT (OUTPATIENT)
Dept: OUTPATIENT SERVICES | Facility: HOSPITAL | Age: 57
LOS: 1 days | End: 2022-12-08
Payer: COMMERCIAL

## 2022-12-08 ENCOUNTER — APPOINTMENT (OUTPATIENT)
Dept: ULTRASOUND IMAGING | Facility: CLINIC | Age: 57
End: 2022-12-08

## 2022-12-08 DIAGNOSIS — Z87.59 PERSONAL HISTORY OF OTHER COMPLICATIONS OF PREGNANCY, CHILDBIRTH AND THE PUERPERIUM: Chronic | ICD-10-CM

## 2022-12-08 DIAGNOSIS — Z98.89 OTHER SPECIFIED POSTPROCEDURAL STATES: Chronic | ICD-10-CM

## 2022-12-08 DIAGNOSIS — Z90.710 ACQUIRED ABSENCE OF BOTH CERVIX AND UTERUS: Chronic | ICD-10-CM

## 2022-12-08 DIAGNOSIS — Z00.8 ENCOUNTER FOR OTHER GENERAL EXAMINATION: ICD-10-CM

## 2022-12-08 PROCEDURE — 76641 ULTRASOUND BREAST COMPLETE: CPT | Mod: 26,50

## 2022-12-08 PROCEDURE — 77063 BREAST TOMOSYNTHESIS BI: CPT | Mod: 26

## 2022-12-08 PROCEDURE — 77063 BREAST TOMOSYNTHESIS BI: CPT

## 2022-12-08 PROCEDURE — 76641 ULTRASOUND BREAST COMPLETE: CPT

## 2022-12-08 PROCEDURE — 77067 SCR MAMMO BI INCL CAD: CPT | Mod: 26

## 2022-12-08 PROCEDURE — 77067 SCR MAMMO BI INCL CAD: CPT

## 2023-01-01 NOTE — SBIRT NOTE ADULT - NSSBIRTNALOXDUR_GEN_A_CORE
Detail Level: Detailed
Quality 130: Documentation Of Current Medications In The Medical Record: Current Medications Documented
5

## 2023-01-18 ENCOUNTER — APPOINTMENT (OUTPATIENT)
Dept: MRI IMAGING | Facility: CLINIC | Age: 58
End: 2023-01-18
Payer: COMMERCIAL

## 2023-01-18 ENCOUNTER — RESULT REVIEW (OUTPATIENT)
Age: 58
End: 2023-01-18

## 2023-01-18 ENCOUNTER — OUTPATIENT (OUTPATIENT)
Dept: OUTPATIENT SERVICES | Facility: HOSPITAL | Age: 58
LOS: 1 days | End: 2023-01-18
Payer: COMMERCIAL

## 2023-01-18 DIAGNOSIS — Z98.89 OTHER SPECIFIED POSTPROCEDURAL STATES: Chronic | ICD-10-CM

## 2023-01-18 DIAGNOSIS — Z90.710 ACQUIRED ABSENCE OF BOTH CERVIX AND UTERUS: Chronic | ICD-10-CM

## 2023-01-18 DIAGNOSIS — Z00.8 ENCOUNTER FOR OTHER GENERAL EXAMINATION: ICD-10-CM

## 2023-01-18 DIAGNOSIS — Z87.59 PERSONAL HISTORY OF OTHER COMPLICATIONS OF PREGNANCY, CHILDBIRTH AND THE PUERPERIUM: Chronic | ICD-10-CM

## 2023-01-18 PROCEDURE — 72146 MRI CHEST SPINE W/O DYE: CPT

## 2023-01-18 PROCEDURE — 72141 MRI NECK SPINE W/O DYE: CPT | Mod: 26

## 2023-01-18 PROCEDURE — 72146 MRI CHEST SPINE W/O DYE: CPT | Mod: 26

## 2023-01-18 PROCEDURE — 72148 MRI LUMBAR SPINE W/O DYE: CPT | Mod: 26

## 2023-01-18 PROCEDURE — 72148 MRI LUMBAR SPINE W/O DYE: CPT

## 2023-01-18 PROCEDURE — 72141 MRI NECK SPINE W/O DYE: CPT

## 2023-03-13 ENCOUNTER — RX CHANGE (OUTPATIENT)
Age: 58
End: 2023-03-13

## 2023-05-04 ENCOUNTER — RX RENEWAL (OUTPATIENT)
Age: 58
End: 2023-05-04

## 2023-06-03 NOTE — ASU PATIENT PROFILE, ADULT - ABILITY TO HEAR (WITH HEARING AID OR HEARING APPLIANCE IF NORMALLY USED):
Regular Diet - No restrictions/Consistent Carbohydrate Diabetic Diets/Renal Diets (for dialysis)
Adequate: hears normal conversation without difficulty

## 2023-06-16 NOTE — HISTORY OF PRESENT ILLNESS
Left message to call back.   [Home] : at home, [unfilled] , at the time of the visit. [Medical Office: ([facility])___] : at ~his/her~ medical office located in V [Patient] : the patient [TextBox_4] : This visit was provided via telehealth using real-time 2-way audio visual technology. The patient, KRISTOPHER RAMIREZ , was located at home, [address] [facility] [place] [address]  at the time of the visit.\par The provider, Dr. Raghav Morales, was located at office [address] [place] [address] at the time of the visit. \par \par  The patient, Ms. KRISTOPHER RAMIREZ  and Physician Raghav Morales  DO [facility] participated in the telehealth encounter.\par \par Verbal consent obtained by  from patient\par \par Coronavirus novel 19+\par Patient symptoms started 1 week prior with sore throat cough then developed some chills body aches and pains then through the past weekend she felt better but on Saturday she felt she had difficulty breathing some chest discomfort with subsequently some left arm pain\par On Sunday she was evaluated urgent care was reviewed and sent home\par She then returned to the ER this past Tuesday night at [place] because of persistence of recurrence of the chest discomfort and she was concerned about her cardiac status\par Blood work including cardiac enzymes EKG chest x-ray were reported okay as reviewed below\par She still has some occasional cough\par No purulent sputum\par She has had no fever throughout this illness\par No deterioration\par

## 2023-07-20 ENCOUNTER — LABORATORY RESULT (OUTPATIENT)
Age: 58
End: 2023-07-20

## 2023-07-20 ENCOUNTER — APPOINTMENT (OUTPATIENT)
Dept: INTERNAL MEDICINE | Facility: CLINIC | Age: 58
End: 2023-07-20
Payer: COMMERCIAL

## 2023-07-20 ENCOUNTER — NON-APPOINTMENT (OUTPATIENT)
Age: 58
End: 2023-07-20

## 2023-07-20 VITALS — SYSTOLIC BLOOD PRESSURE: 130 MMHG | DIASTOLIC BLOOD PRESSURE: 70 MMHG

## 2023-07-20 VITALS — BODY MASS INDEX: 28.41 KG/M2 | HEIGHT: 67 IN | WEIGHT: 181 LBS

## 2023-07-20 DIAGNOSIS — Z87.898 PERSONAL HISTORY OF OTHER SPECIFIED CONDITIONS: ICD-10-CM

## 2023-07-20 DIAGNOSIS — Z00.00 ENCOUNTER FOR GENERAL ADULT MEDICAL EXAMINATION W/OUT ABNORMAL FINDINGS: ICD-10-CM

## 2023-07-20 DIAGNOSIS — R07.89 OTHER CHEST PAIN: ICD-10-CM

## 2023-07-20 DIAGNOSIS — M70.70 OTHER BURSITIS OF HIP, UNSPECIFIED HIP: ICD-10-CM

## 2023-07-20 DIAGNOSIS — Z87.42 PERSONAL HISTORY OF OTHER DISEASES OF THE FEMALE GENITAL TRACT: ICD-10-CM

## 2023-07-20 DIAGNOSIS — R20.0 ANESTHESIA OF SKIN: ICD-10-CM

## 2023-07-20 DIAGNOSIS — Z87.09 PERSONAL HISTORY OF OTHER DISEASES OF THE RESPIRATORY SYSTEM: ICD-10-CM

## 2023-07-20 DIAGNOSIS — Z87.39 PERSONAL HISTORY OF OTHER DISEASES OF THE MUSCULOSKELETAL SYSTEM AND CONNECTIVE TISSUE: ICD-10-CM

## 2023-07-20 DIAGNOSIS — G44.89 OTHER HEADACHE SYNDROME: ICD-10-CM

## 2023-07-20 DIAGNOSIS — R10.2 PELVIC AND PERINEAL PAIN: ICD-10-CM

## 2023-07-20 DIAGNOSIS — Z98.890 OTHER SPECIFIED POSTPROCEDURAL STATES: ICD-10-CM

## 2023-07-20 DIAGNOSIS — Z01.818 ENCOUNTER FOR OTHER PREPROCEDURAL EXAMINATION: ICD-10-CM

## 2023-07-20 DIAGNOSIS — D72.819 DECREASED WHITE BLOOD CELL COUNT, UNSPECIFIED: ICD-10-CM

## 2023-07-20 DIAGNOSIS — Z86.2 PERSONAL HISTORY OF DISEASES OF THE BLOOD AND BLOOD-FORMING ORGANS AND CERTAIN DISORDERS INVOLVING THE IMMUNE MECHANISM: ICD-10-CM

## 2023-07-20 DIAGNOSIS — R20.2 ANESTHESIA OF SKIN: ICD-10-CM

## 2023-07-20 DIAGNOSIS — Z86.010 PERSONAL HISTORY OF COLONIC POLYPS: ICD-10-CM

## 2023-07-20 LAB
BILIRUB UR QL STRIP: NORMAL
CLARITY UR: NORMAL
COLLECTION METHOD: NORMAL
GLUCOSE UR-MCNC: NORMAL
HCG UR QL: 1 EU/DL
HGB UR QL STRIP.AUTO: NORMAL
KETONES UR-MCNC: NORMAL
LEUKOCYTE ESTERASE UR QL STRIP: NORMAL
NITRITE UR QL STRIP: NORMAL
PH UR STRIP: 5.5
PROT UR STRIP-MCNC: NORMAL
SP GR UR STRIP: >1.03

## 2023-07-20 PROCEDURE — 99396 PREV VISIT EST AGE 40-64: CPT | Mod: 25

## 2023-07-20 PROCEDURE — 81003 URINALYSIS AUTO W/O SCOPE: CPT | Mod: QW

## 2023-07-20 PROCEDURE — 36415 COLL VENOUS BLD VENIPUNCTURE: CPT

## 2023-07-20 PROCEDURE — 93000 ELECTROCARDIOGRAM COMPLETE: CPT | Mod: 59

## 2023-07-20 RX ORDER — FLUTICASONE PROPIONATE 50 UG/1
50 SPRAY, METERED NASAL TWICE DAILY
Qty: 1 | Refills: 1 | Status: DISCONTINUED | COMMUNITY
Start: 2022-04-29 | End: 2023-07-20

## 2023-07-20 RX ORDER — SERTRALINE 25 MG/1
25 TABLET, FILM COATED ORAL DAILY
Qty: 90 | Refills: 3 | Status: DISCONTINUED | COMMUNITY
Start: 2022-09-15 | End: 2023-07-20

## 2023-07-20 RX ORDER — MONTELUKAST 10 MG/1
10 TABLET, FILM COATED ORAL
Qty: 30 | Refills: 1 | Status: DISCONTINUED | COMMUNITY
Start: 2019-01-22 | End: 2023-07-20

## 2023-07-20 NOTE — HISTORY OF PRESENT ILLNESS
[de-identified] : This is a 57 year-old patient with a history of hypertension, eosinophilia, sleep apnea syndrome and colonic polyps who is here today for her annual well examination .  The patient is also complaining of menopausal symptoms, urinary frequency every hour at night, pain in her left hip.  In addition the patient has not gone for her sleep apnea study

## 2023-07-20 NOTE — REVIEW OF SYSTEMS
[Fatigue] : fatigue [Cough] : cough [Dyspnea on Exertion] : dyspnea on exertion [Joint Pain] : joint pain [Joint Stiffness] : joint stiffness [Dizziness] : no dizziness [Negative] : Heme/Lymph

## 2023-07-20 NOTE — HEALTH RISK ASSESSMENT
[No] : No [No falls in past year] : Patient reported no falls in the past year [0] : 2) Feeling down, depressed, or hopeless: Not at all (0) [PHQ-2 Negative - No further assessment needed] : PHQ-2 Negative - No further assessment needed [Never] : Never [BQB3Oxtss] : 0

## 2023-07-20 NOTE — ASSESSMENT
[FreeTextEntry1] : Problems\par Hypertension\par Eosinophilia\par Sleep apnea\par colonic polyps\par Hot flashes\par Left hip pain\par Urinary frequency\par Dysuria\par The patient's blood pressure and physical examination were normal aside for a grade 2/6 systolic ejection murmur.  Her urine today reveals night nitrates and WBC.  I ordered Macrobid 100 mg twice daily for 1 week.  A urine culture was ordered.  In addition I ordered an ultrasound of the pelvis and kidneys.  I referred her to cardiology to evaluate her heart murmur and in addition an FSH level was ordered to assess if she was in menopause.  She was also referred for a sleep apnea study and referred to cardiology

## 2023-07-20 NOTE — PHYSICAL EXAM
[Declined Breast Exam] : declined breast exam  [Declined Rectal Exam] : declined rectal exam [Normal] : affect was normal and insight and judgment were intact [de-identified] : Grade 2/6 systolic ejection murmur

## 2023-07-25 ENCOUNTER — APPOINTMENT (OUTPATIENT)
Dept: ULTRASOUND IMAGING | Facility: CLINIC | Age: 58
End: 2023-07-25
Payer: COMMERCIAL

## 2023-07-25 ENCOUNTER — NON-APPOINTMENT (OUTPATIENT)
Age: 58
End: 2023-07-25

## 2023-07-25 LAB
25(OH)D3 SERPL-MCNC: 28.4 NG/ML
ALBUMIN SERPL ELPH-MCNC: 5.1 G/DL
ALP BLD-CCNC: 75 U/L
ALT SERPL-CCNC: 10 U/L
ANION GAP SERPL CALC-SCNC: 11 MMOL/L
APPEARANCE: CLEAR
AST SERPL-CCNC: 17 U/L
BACTERIA UR CULT: NORMAL
BACTERIA: NEGATIVE /HPF
BILIRUB SERPL-MCNC: 0.5 MG/DL
BILIRUBIN URINE: ABNORMAL
BLOOD URINE: NEGATIVE
BUN SERPL-MCNC: 19 MG/DL
CALCIUM SERPL-MCNC: 10.3 MG/DL
CAST: 1 /LPF
CHLORIDE SERPL-SCNC: 103 MMOL/L
CHOLEST SERPL-MCNC: 185 MG/DL
CO2 SERPL-SCNC: 27 MMOL/L
COLOR: NORMAL
CREAT SERPL-MCNC: 1.16 MG/DL
EGFR: 56 ML/MIN/1.73M2
EPITHELIAL CELLS: 2 /HPF
ESTIMATED AVERAGE GLUCOSE: 120 MG/DL
FERRITIN SERPL-MCNC: 96 NG/ML
FOLATE SERPL-MCNC: 7.8 NG/ML
GLUCOSE QUALITATIVE U: NEGATIVE MG/DL
GLUCOSE SERPL-MCNC: 99 MG/DL
HBA1C MFR BLD HPLC: 5.8 %
HDLC SERPL-MCNC: 49 MG/DL
KETONES URINE: ABNORMAL MG/DL
LDLC SERPL CALC-MCNC: 115 MG/DL
LEUKOCYTE ESTERASE URINE: ABNORMAL
MICROSCOPIC-UA: NORMAL
MUCUS: PRESENT
NITRITE URINE: NEGATIVE
NONHDLC SERPL-MCNC: 136 MG/DL
PH URINE: 5.5
POTASSIUM SERPL-SCNC: 3.6 MMOL/L
PROT SERPL-MCNC: 8 G/DL
PROTEIN URINE: 30 MG/DL
RED BLOOD CELLS URINE: NORMAL /HPF
REVIEW: NORMAL
SODIUM SERPL-SCNC: 141 MMOL/L
SPECIFIC GRAVITY URINE: >1.03
T3RU NFR SERPL: 0.9 TBI
T4 SERPL-MCNC: 7.2 UG/DL
TRIGL SERPL-MCNC: 115 MG/DL
TSH SERPL-ACNC: 1.21 UIU/ML
URATE SERPL-MCNC: 4.6 MG/DL
UROBILINOGEN URINE: 1 MG/DL
VIT B12 SERPL-MCNC: 842 PG/ML
WHITE BLOOD CELLS URINE: 2 /HPF

## 2023-07-25 PROCEDURE — 76856 US EXAM PELVIC COMPLETE: CPT

## 2023-07-25 PROCEDURE — 76700 US EXAM ABDOM COMPLETE: CPT

## 2023-07-27 ENCOUNTER — RESULT REVIEW (OUTPATIENT)
Age: 58
End: 2023-07-27

## 2023-07-27 LAB — FSH SERPL-MCNC: 129 IU/L

## 2023-07-28 ENCOUNTER — APPOINTMENT (OUTPATIENT)
Dept: CT IMAGING | Facility: CLINIC | Age: 58
End: 2023-07-28

## 2023-08-01 ENCOUNTER — APPOINTMENT (OUTPATIENT)
Dept: ULTRASOUND IMAGING | Facility: CLINIC | Age: 58
End: 2023-08-01
Payer: COMMERCIAL

## 2023-08-01 ENCOUNTER — OUTPATIENT (OUTPATIENT)
Dept: OUTPATIENT SERVICES | Facility: HOSPITAL | Age: 58
LOS: 1 days | End: 2023-08-01
Payer: COMMERCIAL

## 2023-08-01 DIAGNOSIS — Z98.89 OTHER SPECIFIED POSTPROCEDURAL STATES: Chronic | ICD-10-CM

## 2023-08-01 DIAGNOSIS — Z87.59 PERSONAL HISTORY OF OTHER COMPLICATIONS OF PREGNANCY, CHILDBIRTH AND THE PUERPERIUM: Chronic | ICD-10-CM

## 2023-08-01 DIAGNOSIS — Z90.710 ACQUIRED ABSENCE OF BOTH CERVIX AND UTERUS: Chronic | ICD-10-CM

## 2023-08-01 DIAGNOSIS — Z00.8 ENCOUNTER FOR OTHER GENERAL EXAMINATION: ICD-10-CM

## 2023-08-01 PROCEDURE — 76642 ULTRASOUND BREAST LIMITED: CPT | Mod: 26,LT

## 2023-08-01 PROCEDURE — 76642 ULTRASOUND BREAST LIMITED: CPT

## 2023-08-02 ENCOUNTER — OUTPATIENT (OUTPATIENT)
Dept: OUTPATIENT SERVICES | Facility: HOSPITAL | Age: 58
LOS: 1 days | End: 2023-08-02
Payer: COMMERCIAL

## 2023-08-02 ENCOUNTER — APPOINTMENT (OUTPATIENT)
Dept: CT IMAGING | Facility: CLINIC | Age: 58
End: 2023-08-02
Payer: COMMERCIAL

## 2023-08-02 DIAGNOSIS — Z98.89 OTHER SPECIFIED POSTPROCEDURAL STATES: Chronic | ICD-10-CM

## 2023-08-02 DIAGNOSIS — Z87.59 PERSONAL HISTORY OF OTHER COMPLICATIONS OF PREGNANCY, CHILDBIRTH AND THE PUERPERIUM: Chronic | ICD-10-CM

## 2023-08-02 DIAGNOSIS — Z00.8 ENCOUNTER FOR OTHER GENERAL EXAMINATION: ICD-10-CM

## 2023-08-02 DIAGNOSIS — N13.30 UNSPECIFIED HYDRONEPHROSIS: ICD-10-CM

## 2023-08-02 DIAGNOSIS — Z90.710 ACQUIRED ABSENCE OF BOTH CERVIX AND UTERUS: Chronic | ICD-10-CM

## 2023-08-02 PROCEDURE — 74178 CT ABD&PLV WO CNTR FLWD CNTR: CPT | Mod: 26

## 2023-08-02 PROCEDURE — 74178 CT ABD&PLV WO CNTR FLWD CNTR: CPT

## 2023-08-10 ENCOUNTER — APPOINTMENT (OUTPATIENT)
Dept: INTERNAL MEDICINE | Facility: CLINIC | Age: 58
End: 2023-08-10

## 2023-08-21 ENCOUNTER — APPOINTMENT (OUTPATIENT)
Dept: OBGYN | Facility: CLINIC | Age: 58
End: 2023-08-21
Payer: COMMERCIAL

## 2023-08-21 VITALS
HEART RATE: 71 BPM | WEIGHT: 181 LBS | SYSTOLIC BLOOD PRESSURE: 133 MMHG | BODY MASS INDEX: 28.41 KG/M2 | DIASTOLIC BLOOD PRESSURE: 86 MMHG | HEIGHT: 67 IN

## 2023-08-21 DIAGNOSIS — Z01.419 ENCOUNTER FOR GYNECOLOGICAL EXAMINATION (GENERAL) (ROUTINE) W/OUT ABNORMAL FINDINGS: ICD-10-CM

## 2023-08-21 PROCEDURE — 99396 PREV VISIT EST AGE 40-64: CPT

## 2023-08-21 RX ORDER — CHROMIUM 200 MCG
TABLET ORAL
Refills: 0 | Status: ACTIVE | COMMUNITY

## 2023-08-21 RX ORDER — MELOXICAM 15 MG/1
15 TABLET ORAL
Refills: 0 | Status: ACTIVE | COMMUNITY

## 2023-08-21 RX ORDER — NITROFURANTOIN (MONOHYDRATE/MACROCRYSTALS) 25; 75 MG/1; MG/1
100 CAPSULE ORAL
Qty: 14 | Refills: 0 | Status: DISCONTINUED | COMMUNITY
Start: 2023-07-24 | End: 2023-08-21

## 2023-08-21 RX ORDER — FLUTICASONE PROPIONATE 110 UG/1
110 AEROSOL, METERED RESPIRATORY (INHALATION)
Qty: 1 | Refills: 3 | Status: DISCONTINUED | COMMUNITY
Start: 2020-05-11 | End: 2023-08-21

## 2023-08-21 RX ORDER — ALPRAZOLAM 0.25 MG/1
0.25 TABLET ORAL
Qty: 60 | Refills: 0 | Status: DISCONTINUED | COMMUNITY
Start: 2020-04-24 | End: 2023-08-21

## 2023-08-21 RX ORDER — ALBUTEROL SULFATE 90 UG/1
108 (90 BASE) INHALANT RESPIRATORY (INHALATION)
Qty: 1 | Refills: 3 | Status: DISCONTINUED | COMMUNITY
Start: 2020-04-20 | End: 2023-08-21

## 2023-08-22 LAB — HPV HIGH+LOW RISK DNA PNL CVX: NOT DETECTED

## 2023-08-23 LAB — CYTOLOGY CVX/VAG DOC THIN PREP: ABNORMAL

## 2023-08-29 NOTE — ED ADULT NURSE NOTE - NSSUHOSCREENINGYN_ED_ALL_ED
Patient attended phase II pulmonary rehab exercise session today and tolerated well with no complaints. Patient reports taking all medication and denies any changes in health status on entry. Today's exercise session report will be scanned into the medical record.   Yes - the patient is able to be screened

## 2023-09-07 ENCOUNTER — EMERGENCY (EMERGENCY)
Facility: HOSPITAL | Age: 58
LOS: 1 days | Discharge: HOME | End: 2023-09-07
Attending: STUDENT IN AN ORGANIZED HEALTH CARE EDUCATION/TRAINING PROGRAM
Payer: COMMERCIAL

## 2023-09-07 ENCOUNTER — APPOINTMENT (OUTPATIENT)
Dept: CARDIOLOGY | Facility: CLINIC | Age: 58
End: 2023-09-07

## 2023-09-07 VITALS
SYSTOLIC BLOOD PRESSURE: 127 MMHG | DIASTOLIC BLOOD PRESSURE: 77 MMHG | TEMPERATURE: 98 F | HEART RATE: 68 BPM | OXYGEN SATURATION: 99 % | RESPIRATION RATE: 16 BRPM

## 2023-09-07 VITALS
TEMPERATURE: 99 F | OXYGEN SATURATION: 98 % | HEART RATE: 66 BPM | DIASTOLIC BLOOD PRESSURE: 83 MMHG | RESPIRATION RATE: 16 BRPM | SYSTOLIC BLOOD PRESSURE: 146 MMHG

## 2023-09-07 DIAGNOSIS — Z98.89 OTHER SPECIFIED POSTPROCEDURAL STATES: Chronic | ICD-10-CM

## 2023-09-07 DIAGNOSIS — Z90.710 ACQUIRED ABSENCE OF BOTH CERVIX AND UTERUS: Chronic | ICD-10-CM

## 2023-09-07 DIAGNOSIS — Z87.59 PERSONAL HISTORY OF OTHER COMPLICATIONS OF PREGNANCY, CHILDBIRTH AND THE PUERPERIUM: Chronic | ICD-10-CM

## 2023-09-07 LAB
ALBUMIN SERPL ELPH-MCNC: 4.8 G/DL — SIGNIFICANT CHANGE UP (ref 3.3–5)
ALP SERPL-CCNC: 73 U/L — SIGNIFICANT CHANGE UP (ref 40–120)
ALT FLD-CCNC: 13 U/L — SIGNIFICANT CHANGE UP (ref 10–45)
ANION GAP SERPL CALC-SCNC: 12 MMOL/L — SIGNIFICANT CHANGE UP (ref 5–17)
APTT BLD: 38.8 SEC — HIGH (ref 24.5–35.6)
AST SERPL-CCNC: 17 U/L — SIGNIFICANT CHANGE UP (ref 10–40)
BASOPHILS # BLD AUTO: 0.02 K/UL — SIGNIFICANT CHANGE UP (ref 0–0.2)
BASOPHILS NFR BLD AUTO: 0.3 % — SIGNIFICANT CHANGE UP (ref 0–2)
BILIRUB SERPL-MCNC: 0.4 MG/DL — SIGNIFICANT CHANGE UP (ref 0.2–1.2)
BUN SERPL-MCNC: 16 MG/DL — SIGNIFICANT CHANGE UP (ref 7–23)
CALCIUM SERPL-MCNC: 10 MG/DL — SIGNIFICANT CHANGE UP (ref 8.4–10.5)
CHLORIDE SERPL-SCNC: 103 MMOL/L — SIGNIFICANT CHANGE UP (ref 96–108)
CO2 SERPL-SCNC: 25 MMOL/L — SIGNIFICANT CHANGE UP (ref 22–31)
CREAT SERPL-MCNC: 0.96 MG/DL — SIGNIFICANT CHANGE UP (ref 0.5–1.3)
EGFR: 69 ML/MIN/1.73M2 — SIGNIFICANT CHANGE UP
EOSINOPHIL # BLD AUTO: 0.2 K/UL — SIGNIFICANT CHANGE UP (ref 0–0.5)
EOSINOPHIL NFR BLD AUTO: 3.3 % — SIGNIFICANT CHANGE UP (ref 0–6)
GLUCOSE SERPL-MCNC: 115 MG/DL — HIGH (ref 70–99)
HCT VFR BLD CALC: 39.8 % — SIGNIFICANT CHANGE UP (ref 34.5–45)
HGB BLD-MCNC: 13 G/DL — SIGNIFICANT CHANGE UP (ref 11.5–15.5)
IMM GRANULOCYTES NFR BLD AUTO: 0.5 % — SIGNIFICANT CHANGE UP (ref 0–0.9)
INR BLD: 0.99 RATIO — SIGNIFICANT CHANGE UP (ref 0.85–1.18)
LYMPHOCYTES # BLD AUTO: 1.92 K/UL — SIGNIFICANT CHANGE UP (ref 1–3.3)
LYMPHOCYTES # BLD AUTO: 31.7 % — SIGNIFICANT CHANGE UP (ref 13–44)
MCHC RBC-ENTMCNC: 31.7 PG — SIGNIFICANT CHANGE UP (ref 27–34)
MCHC RBC-ENTMCNC: 32.7 GM/DL — SIGNIFICANT CHANGE UP (ref 32–36)
MCV RBC AUTO: 97.1 FL — SIGNIFICANT CHANGE UP (ref 80–100)
MONOCYTES # BLD AUTO: 0.44 K/UL — SIGNIFICANT CHANGE UP (ref 0–0.9)
MONOCYTES NFR BLD AUTO: 7.3 % — SIGNIFICANT CHANGE UP (ref 2–14)
NEUTROPHILS # BLD AUTO: 3.44 K/UL — SIGNIFICANT CHANGE UP (ref 1.8–7.4)
NEUTROPHILS NFR BLD AUTO: 56.9 % — SIGNIFICANT CHANGE UP (ref 43–77)
NRBC # BLD: 0 /100 WBCS — SIGNIFICANT CHANGE UP (ref 0–0)
PLATELET # BLD AUTO: 281 K/UL — SIGNIFICANT CHANGE UP (ref 150–400)
POTASSIUM SERPL-MCNC: 4.1 MMOL/L — SIGNIFICANT CHANGE UP (ref 3.5–5.3)
POTASSIUM SERPL-SCNC: 4.1 MMOL/L — SIGNIFICANT CHANGE UP (ref 3.5–5.3)
PROT SERPL-MCNC: 8.8 G/DL — HIGH (ref 6–8.3)
PROTHROM AB SERPL-ACNC: 10.9 SEC — SIGNIFICANT CHANGE UP (ref 9.5–13)
RBC # BLD: 4.1 M/UL — SIGNIFICANT CHANGE UP (ref 3.8–5.2)
RBC # FLD: 13.8 % — SIGNIFICANT CHANGE UP (ref 10.3–14.5)
SODIUM SERPL-SCNC: 140 MMOL/L — SIGNIFICANT CHANGE UP (ref 135–145)
TROPONIN T, HIGH SENSITIVITY RESULT: 6 NG/L — SIGNIFICANT CHANGE UP (ref 0–51)
WBC # BLD: 6.05 K/UL — SIGNIFICANT CHANGE UP (ref 3.8–10.5)
WBC # FLD AUTO: 6.05 K/UL — SIGNIFICANT CHANGE UP (ref 3.8–10.5)

## 2023-09-07 PROCEDURE — 70450 CT HEAD/BRAIN W/O DYE: CPT | Mod: 26,MA,59

## 2023-09-07 PROCEDURE — 96374 THER/PROPH/DIAG INJ IV PUSH: CPT | Mod: XU

## 2023-09-07 PROCEDURE — 84132 ASSAY OF SERUM POTASSIUM: CPT

## 2023-09-07 PROCEDURE — 80053 COMPREHEN METABOLIC PANEL: CPT

## 2023-09-07 PROCEDURE — 82803 BLOOD GASES ANY COMBINATION: CPT

## 2023-09-07 PROCEDURE — 82962 GLUCOSE BLOOD TEST: CPT

## 2023-09-07 PROCEDURE — 84295 ASSAY OF SERUM SODIUM: CPT

## 2023-09-07 PROCEDURE — 82435 ASSAY OF BLOOD CHLORIDE: CPT

## 2023-09-07 PROCEDURE — 99291 CRITICAL CARE FIRST HOUR: CPT

## 2023-09-07 PROCEDURE — 85730 THROMBOPLASTIN TIME PARTIAL: CPT

## 2023-09-07 PROCEDURE — 70496 CT ANGIOGRAPHY HEAD: CPT | Mod: MA

## 2023-09-07 PROCEDURE — 70496 CT ANGIOGRAPHY HEAD: CPT | Mod: 26,MA

## 2023-09-07 PROCEDURE — 70498 CT ANGIOGRAPHY NECK: CPT | Mod: 26,MA

## 2023-09-07 PROCEDURE — 85610 PROTHROMBIN TIME: CPT

## 2023-09-07 PROCEDURE — 85018 HEMOGLOBIN: CPT

## 2023-09-07 PROCEDURE — 82947 ASSAY GLUCOSE BLOOD QUANT: CPT

## 2023-09-07 PROCEDURE — 99291 CRITICAL CARE FIRST HOUR: CPT | Mod: 25

## 2023-09-07 PROCEDURE — 85014 HEMATOCRIT: CPT

## 2023-09-07 PROCEDURE — 93005 ELECTROCARDIOGRAM TRACING: CPT

## 2023-09-07 PROCEDURE — 84484 ASSAY OF TROPONIN QUANT: CPT

## 2023-09-07 PROCEDURE — 96375 TX/PRO/DX INJ NEW DRUG ADDON: CPT | Mod: XU

## 2023-09-07 PROCEDURE — 70498 CT ANGIOGRAPHY NECK: CPT | Mod: MA

## 2023-09-07 PROCEDURE — 83605 ASSAY OF LACTIC ACID: CPT

## 2023-09-07 PROCEDURE — 70450 CT HEAD/BRAIN W/O DYE: CPT | Mod: MA

## 2023-09-07 PROCEDURE — 85025 COMPLETE CBC W/AUTO DIFF WBC: CPT

## 2023-09-07 PROCEDURE — 82330 ASSAY OF CALCIUM: CPT

## 2023-09-07 RX ORDER — MAGNESIUM SULFATE 500 MG/ML
1 VIAL (ML) INJECTION ONCE
Refills: 0 | Status: COMPLETED | OUTPATIENT
Start: 2023-09-07 | End: 2023-09-07

## 2023-09-07 RX ORDER — ACETAMINOPHEN 500 MG
1000 TABLET ORAL ONCE
Refills: 0 | Status: COMPLETED | OUTPATIENT
Start: 2023-09-07 | End: 2023-09-07

## 2023-09-07 RX ORDER — METOCLOPRAMIDE HCL 10 MG
10 TABLET ORAL ONCE
Refills: 0 | Status: COMPLETED | OUTPATIENT
Start: 2023-09-07 | End: 2023-09-07

## 2023-09-07 RX ORDER — ONDANSETRON 8 MG/1
1 TABLET, FILM COATED ORAL
Qty: 15 | Refills: 0
Start: 2023-09-07 | End: 2023-09-11

## 2023-09-07 RX ORDER — MECLIZINE HCL 12.5 MG
1 TABLET ORAL
Qty: 15 | Refills: 0
Start: 2023-09-07 | End: 2023-09-11

## 2023-09-07 RX ADMIN — Medication 400 MILLIGRAM(S): at 14:03

## 2023-09-07 RX ADMIN — Medication 100 GRAM(S): at 15:09

## 2023-09-07 RX ADMIN — Medication 10 MILLIGRAM(S): at 14:03

## 2023-09-07 NOTE — ED PROVIDER NOTE - CLINICAL SUMMARY MEDICAL DECISION MAKING FREE TEXT BOX
58-year-old female, presenting with left upper extremity numbness.  Associated headache.  Vital signs are unremarkable.  NIH stroke scale is unremarkable.   Assess for evidence of large territorial infarct, hemorrhagic CVA, mass effect.  Pain control.  Head CT, CTA.  Close reassessment.

## 2023-09-07 NOTE — ED PROVIDER NOTE - NSFOLLOWUPCLINICS_GEN_ALL_ED_FT
[facility]  Neurology  [address] [place] [address] [address] [place] [address]  Phone: [phone]  Fax:

## 2023-09-07 NOTE — CONSULT NOTE ADULT - ASSESSMENT
ASSESSMENT   58y woman with a PMHx significant for HTN presented to the hospital for room spinning dizziness and mild headache. She woke up at 0430 on 9/7 at her baseline. LKN 2200 9/6/2023. It was only when she turned her head to the right and left of her bed when she started to demonstrated room spinning dizziness. She waited a couple of minutes before the spinning sensation dissipated before getting out of bed at 0500, for which the room spinning sensation occurred again. She called on her son at 0534 after which she started to experience a mild non-positional headache that started on the front of her face 1/0 that worsened over 20-30 minutes to 7/10. The headache got better over the course of several hours but the room spinning dizziness remained. Given persistence of symptoms by around noon, decided to come to the hospital. CODE STROKE called at 1257. NIHSS 0. mRS 0. CT head demonstrated Left BG calcification that is chronic. No LVO found on vessel imaging. Patient not candidate for tenecteplase due to outside window. ABCD score 3. Not candidate for thrombectomy because no LVO.       IMPRESSION   Overall stable neurologically. Room spinning dizziness and concurrent mild headache that is exacerbated when patient turns head left and right. With a positive HINTS with right beating nystagmus, symptoms likely vertigo with peripheral etiology     RECOMMENDATION   [] symptomatic treatment for headache   [] f/u CT/CTA  [] orthostatic VS  [] EKG to evaluate for QT prolongation. Consider against zofran if QTc > 500ms  [] consider IVFs  [] Meclizine 25mg Q8  [] Refer for Vestibular Rehab on discharge  [] ENT f/u outpatient  [] Neurology f/u outpatient   [] No neurological contraindication to discharge as patient is otherwise stable  [] Follow up with Neurology as outpatient. Patient can follow up with Dr. William Pérez or Dr. Gilles Vogel after discharge. Please instruct the patient/family to call [phone] to schedule an appointment within the next 1-2 weeks. Office is located at [address] [place] [address].    Patient to be seen by team and attending. Note finalized upon attending attestation.

## 2023-09-07 NOTE — ED PROVIDER NOTE - NSFOLLOWUPINSTRUCTIONS_ED_ALL_ED_FT
Dizziness    Dizziness can manifest as a feeling of unsteadiness or light-headedness. You may feel like you are about to faint. This condition can be caused by a number of things, including medicines, dehydration, or illness. Drink enough fluid to keep your urine clear or pale yellow. Do not drink alcohol and limit your caffeine intake. Avoid quick or sudden movements.  Rise slowly from chairs and steady yourself until you feel okay. In the morning, first sit up on the side of the bed.    - You can take Tylenol 1000mg every 6-8 hours as needed.  - You can take Motrin (ibuprofun) 600mg every 6-8 hours as needed.    - You can take MECLIZINE 25mg every 8 hours as needed for dizziness.  - You can take ZOFRAN 4mg every 6-8 hours as needed for nausea/vomiting.   Follow up with your primary care provider.     SEEK IMMEDIATE MEDICAL CARE IF YOU HAVE ANY OF THE FOLLOWING SYMPTOMS: vomiting, changes in your vision or speech, weakness in your arms or legs, trouble speaking or swallowing, chest pain, abdominal pain, shortness of breath, sweating, bleeding, headache, neck pain, or fever.

## 2023-09-07 NOTE — ED ADULT NURSE NOTE - OBJECTIVE STATEMENT
Patient is a 59 yo female A&OX4 PMH HTN, migraines presenting to the ED from home complaining of a headache and LUE and LLE weakness. Code stroke called 1257. Patient endorses headache, dizziness, blurred vision, LUE and LLE weakness and numbness after waking up at 0430 this morning. Patient states she felt fine before bed last night at 10PM. LKN 10PM. Patient waited for symptoms to subside before seeking medical care. Patient endorses headache and LLE and LUE numbness upon arrival to [facility]. Denies blurry vision. IV access obtained 18G. Patient denies fever/chills, SOB, chest pain, abd pain, n/v/diarrhea, urinary symptoms, dizziness, weakness. PERRLA, no focal motor or sensory deficits, 5/5 strength BLE and BUE.

## 2023-09-07 NOTE — ED PROVIDER NOTE - PROGRESS NOTE DETAILS
Tray PGY3: Patient endorsed to me at sign out. Seen and assessed at bedside. Patient feels improved, no further weakness/numbness in arm. HA/dizziness improved. Agreeable to plan to dc home with neuro f/u

## 2023-09-07 NOTE — ED PROVIDER NOTE - PATIENT PORTAL LINK FT
You can access the FollowMyHealth Patient Portal offered by [facility] by registering at the following website: [email or web address]. By joining [employer]’s FollowMyHealth portal, you will also be able to view your health information using other applications (apps) compatible with our system.

## 2023-09-07 NOTE — ED ADULT NURSE NOTE - DISCHARGE DATE/TIME
Bedside and Verbal shift change report given to ELZA Sanchez RN  (oncoming nurse) by TRICE Eisenberg LPN (offgoing nurse). Report given with SBAR, Kardex, Intake/Output, MAR and Recent Results. 07-Sep-2023 16:30

## 2023-09-07 NOTE — CONSULT NOTE ADULT - SUBJECTIVE AND OBJECTIVE BOX
Neurology - Consult Note    -  Spectra: [phone] ([facility]), [phone] ([facility])  -    HPI: Patient KRISTOPHER RAMIREZ is a 58y (1965) woman with a PMHx significant for HTN presented to the hospital for room spinning dizziness and mild headache. She woke up at 0430 on  at her baseline. LKN 2200 2023. It was only when she turned her head to the right and left of her bed when she started to demonstrated room spinning dizziness. She waited a couple of minutes before the spinning sensation dissipated before getting out of bed at 0500, for which the room spinning sensation occurred again. She called on her son at 0534 after which she started to experience a mild non-positional headache that started on the front of her face 1/0 that worsened over 20-30 minutes to 7/10. The headache got better over the course of several hours but the room spinning dizziness remained. Given persistence of symptoms by around noon, decided to come to the hospital. CODE STROKE called at 1257. NIHSS 0. mRS 0. CT head demonstrated Left BG calcification that is chronic. No LVO found on vessel imaging. Patient not candidate for tenecteplase due to outside window. ABCD score 3. Not candidate for thrombectomy because no LVO.     Of note, the patient suspects father with a history of stroke and renal cell carcinoma. Paternal grandmother with colon cancer.     Review of Systems:    CONSTITUTIONAL: No fevers or chills  EYES AND ENT: spinning sensation   RESPIRATORY: +shortness of breath  CARDIOVASCULAR: No chest pain  GASTROINTESTINAL: No melena or hematochezia  GENITOURINARY: +nausea   NEUROLOGICAL: +As stated in HPI above  All other review of systems is negative unless indicated above.    Allergies:  No Known Drug Allergies  Dog and Cat dander ---rhinitis (Other)  dust (Rhinitis)      PMHx/PSHx/Family Hx: As above, otherwise see below   Hypertension    Leiomyoma    Hernia    Seasonal allergies    Masses of both breasts    Spontaneous     Palpitations    Obese        Social Hx:  No current use of tobacco or illicit drugs  1-2 cups of wine twice a week     Medications:  MEDICATIONS  (STANDING):  magnesium sulfate  IVPB 1 Gram(s) IV Intermittent Once    MEDICATIONS  (PRN):      Vitals:  T(C): 37 (23 @ 14:13), Max: 37 (23 @ 13:20)  HR: 66 (23 @ 14:13) (66 - 66)  BP: 146/83 (23 @ 14:13) (146/83 - 146/83)  RR: 16 (23 @ 14:13) (16 - 16)  SpO2: 98% (23 @ 14:13) (98% - 98%)    Physical Examination:   General - NAD, pleasant, cooperative   Cardiovascular - Peripheral pulses palpable, no edema  Neurologic Exam:  Mental status - Awake, Alert, Oriented to person, place, and time. Speech fluent, repetition and naming intact. Follows simple and complex commands. Attention/concentration, recent and remote memory (including registration and recall), and fund of knowledge intact    Cranial nerves:  CN II: Visual fields are full to confrontation. Pupils are 3 mm and briskly reactive to light.  CN III, IV, VI: EOMI, no nystagmus, no ptosis  CN V: Facial sensation is intact to pinprick in all 3 divisions bilaterally.  CN VII: Face is symmetric with normal eye closure and smile.  CN VII: Hearing is normal to rubbing fingers  CN IX, X: Palate elevates symmetrically. Phonation is normal.  CN XI: Head turning and shoulder shrug are intact  CN XII: Tongue is midline with normal movements and no atrophy.    Motor - Normal bulk and tone throughout. No pronator drift of out-stretched arms.  Strength testing            Deltoid(C5)  Biceps(C6)    Triceps(C7)     Wrist Extension    Wrist Flexion (C8)     Interossei  (T1)       R            5                 5                        5                     5                              5                                      5                         5  L             5                 5                        5                     5                              5                                      5                          5              Hip Flexion(L2/3)    Hip Extension (L4/5)   Knee Flexion (L4/5/S1)    Knee Extension (L3/4)   Dorsiflexion (L4/5)   Plantar Flexion (S1)  R              5                                    5                                     5                                                     5                                              5                          5  L              5                                     5                                     5                                                     5                                              5                          5    Sensation - Light touch intact in fingers and toes     DTR's -             Biceps      Triceps     Brachioradialis      Patellar    Ankle    Toes/plantar response  R             2+             2+                  2+                       2+            2+                 Down  L              2+             2+                 2+                        2+           2+                 Down    Coordination -There is no dysmetria on finger-to-nose   Gait and station - not assessed     Right beating nystagmus  No accomodation   No test of skew       Labs:                        13.0   6.05  )-----------( 281      ( 07 Sep 2023 13:10 )             39.8     09-07    140  |  103  |  16  ----------------------------<  115<H>  4.1   |  25  |  0.96    Ca    10.0      07 Sep 2023 13:10    TPro  8.8<H>  /  Alb  4.8  /  TBili  0.4  /  DBili  x   /  AST  17  /  ALT  13  /  AlkPhos  73  09-07    CAPILLARY BLOOD GLUCOSE  126 (07 Sep 2023 13:08)      POCT Blood Glucose.: 127 mg/dL (07 Sep 2023 12:54)    LIVER FUNCTIONS - ( 07 Sep 2023 13:10 )  Alb: 4.8 g/dL / Pro: 8.8 g/dL / ALK PHOS: 73 U/L / ALT: 13 U/L / AST: 17 U/L / GGT: x             PT/INR - ( 07 Sep 2023 13:10 )   PT: 10.9 sec;   INR: 0.99 ratio         PTT - ( 07 Sep 2023 13:10 )  PTT:38.8 sec  CSF:                  Radiology:    Unremarkable noncontrast brain. No change from 2020.   Normal CT angiogram of the head and neck.

## 2023-09-07 NOTE — ED PROVIDER NOTE - PHYSICAL EXAMINATION
Gen: NAD, non-toxic appearing  Head: normal appearing  HEENT: normal conjunctiva  Lung: no respiratory distress, speaking in full sentences, ctab     CV: regular rate and rhythm, no murmurs  Abd: soft, non distended, non tender   MSK: no visible deformities  Neuro:   Alert and grossly oriented, following commands  CN II-XII intact  5/5 strength in x4 extremities   Intact sensation to light touch  in x4 extremities  Skin: No joel rashes

## 2023-09-07 NOTE — STROKE CODE NOTE - NSSTROKEABCD2DIABETES_GEN_A_CORE
Patient is restless, continues to yell and cuss. [facility] officers at bedside. No acute distress noted. No

## 2023-09-07 NOTE — ED PROVIDER NOTE - ATTENDING CONTRIBUTION TO CARE
I was the supervising attending. I have independently seen face-to-face and examined the patient. I have reviewed the history and physical and discussed the MDM with the resident, fellow, SANNA and/or student. I agree with the assessment and plan as presented unless otherwise documented as follows:    58F denies PMH/PSH presenting as stroke code for dizziness, LUE numbness, HA. Woke up at 430 with these symptoms, LKW 10pm when she went to bed. Appears well, AOx3, not in acute distress. Neurologically intact on initial exam, NIHSS = 0. Out of the window for TNK, exam/presentation not consistent with LVO. Will obtain labs, CT, follow up on neurology recommendations. -Dayan Mac MD (Attending)

## 2023-09-07 NOTE — ED PROVIDER NOTE - ADDITIONAL NOTES AND INSTRUCTIONS:
To Whom it May Concern - Patient seen and evaluated in Emergency Room. Please excuse the above individual for medical care and recovery until date above. Thank you for you care. - Aura Feliz MD.

## 2023-09-07 NOTE — ED PROVIDER NOTE - OBJECTIVE STATEMENT
58-year-old female, no chronic medical comorbidities, presenting with a chief complaint of headache and left upper extremity numbness.  Last known well time was yesterday at 10 PM.  She woke up at 430 with the associated symptoms.  She had lightheadedness as well, which is resolved.  Currently no other active symptoms.  No allergies to medications.  No regular medications.  No history of early strokes in the family.

## 2023-09-21 ENCOUNTER — APPOINTMENT (OUTPATIENT)
Dept: INTERNAL MEDICINE | Facility: CLINIC | Age: 58
End: 2023-09-21
Payer: COMMERCIAL

## 2023-09-21 VITALS
DIASTOLIC BLOOD PRESSURE: 78 MMHG | BODY MASS INDEX: 28.41 KG/M2 | WEIGHT: 181 LBS | SYSTOLIC BLOOD PRESSURE: 138 MMHG | HEIGHT: 67 IN

## 2023-09-21 DIAGNOSIS — R42 DIZZINESS AND GIDDINESS: ICD-10-CM

## 2023-09-21 PROCEDURE — 99214 OFFICE O/P EST MOD 30 MIN: CPT

## 2023-09-28 ENCOUNTER — APPOINTMENT (OUTPATIENT)
Dept: UROLOGY | Facility: CLINIC | Age: 58
End: 2023-09-28
Payer: COMMERCIAL

## 2023-09-28 ENCOUNTER — APPOINTMENT (OUTPATIENT)
Dept: MRI IMAGING | Facility: CLINIC | Age: 58
End: 2023-09-28
Payer: COMMERCIAL

## 2023-09-28 ENCOUNTER — RESULT REVIEW (OUTPATIENT)
Age: 58
End: 2023-09-28

## 2023-09-28 DIAGNOSIS — N20.0 CALCULUS OF KIDNEY: ICD-10-CM

## 2023-09-28 PROCEDURE — 70551 MRI BRAIN STEM W/O DYE: CPT

## 2023-09-28 PROCEDURE — 99204 OFFICE O/P NEW MOD 45 MIN: CPT

## 2023-09-29 LAB
APPEARANCE: CLEAR
BACTERIA: NEGATIVE /HPF
BILIRUBIN URINE: ABNORMAL
BLOOD URINE: NEGATIVE
CALCIUM OXALATE CRYSTALS: PRESENT
CAST: 22 /LPF
COLOR: NORMAL
EPITHELIAL CELLS: 2 /HPF
GLUCOSE QUALITATIVE U: NEGATIVE MG/DL
HYALINE CASTS: PRESENT
KETONES URINE: ABNORMAL MG/DL
LEUKOCYTE ESTERASE URINE: ABNORMAL
MICROSCOPIC-UA: NORMAL
MUCUS: PRESENT
NITRITE URINE: NEGATIVE
PH URINE: 5.5
PROTEIN URINE: 30 MG/DL
RED BLOOD CELLS URINE: 2 /HPF
REVIEW: NORMAL
SPECIFIC GRAVITY URINE: >1.03
URINE CYTOLOGY: NORMAL
UROBILINOGEN URINE: 1 MG/DL
WHITE BLOOD CELLS URINE: 1 /HPF

## 2023-10-03 DIAGNOSIS — F32.A DEPRESSION, UNSPECIFIED: ICD-10-CM

## 2023-10-03 LAB — BACTERIA UR CULT: NORMAL

## 2023-10-03 RX ORDER — MECLIZINE HYDROCHLORIDE 25 MG/1
25 TABLET ORAL 3 TIMES DAILY
Refills: 0 | Status: ACTIVE | COMMUNITY

## 2023-10-03 RX ORDER — LEVOCETIRIZINE DIHYDROCHLORIDE 5 MG/1
5 TABLET ORAL
Qty: 30 | Refills: 8 | Status: DISCONTINUED | COMMUNITY
Start: 2019-03-19 | End: 2023-10-03

## 2023-10-03 RX ORDER — ONDANSETRON 4 MG/1
4 TABLET ORAL EVERY 8 HOURS
Refills: 0 | Status: ACTIVE | COMMUNITY

## 2023-10-05 PROBLEM — N20.0 KIDNEY STONE ON RIGHT SIDE: Status: ACTIVE | Noted: 2023-08-02

## 2023-10-16 ENCOUNTER — APPOINTMENT (OUTPATIENT)
Dept: UROLOGY | Facility: CLINIC | Age: 58
End: 2023-10-16

## 2023-10-18 ENCOUNTER — APPOINTMENT (OUTPATIENT)
Dept: CARDIOLOGY | Facility: CLINIC | Age: 58
End: 2023-10-18
Payer: COMMERCIAL

## 2023-10-18 ENCOUNTER — NON-APPOINTMENT (OUTPATIENT)
Age: 58
End: 2023-10-18

## 2023-10-18 VITALS
WEIGHT: 182 LBS | SYSTOLIC BLOOD PRESSURE: 162 MMHG | DIASTOLIC BLOOD PRESSURE: 100 MMHG | HEART RATE: 67 BPM | OXYGEN SATURATION: 100 % | BODY MASS INDEX: 28.51 KG/M2

## 2023-10-18 VITALS — SYSTOLIC BLOOD PRESSURE: 162 MMHG | DIASTOLIC BLOOD PRESSURE: 98 MMHG

## 2023-10-18 DIAGNOSIS — I34.0 NONRHEUMATIC MITRAL (VALVE) INSUFFICIENCY: ICD-10-CM

## 2023-10-18 PROCEDURE — 99204 OFFICE O/P NEW MOD 45 MIN: CPT

## 2023-10-18 PROCEDURE — 93000 ELECTROCARDIOGRAM COMPLETE: CPT

## 2023-10-18 RX ORDER — METOPROLOL TARTRATE 25 MG/1
25 TABLET, FILM COATED ORAL
Qty: 60 | Refills: 11 | Status: DISCONTINUED | COMMUNITY
Start: 2022-01-31 | End: 2023-10-18

## 2023-10-18 RX ORDER — NIFEDIPINE 90 MG/1
90 TABLET, EXTENDED RELEASE ORAL DAILY
Refills: 0 | Status: DISCONTINUED | COMMUNITY
Start: 2020-04-07 | End: 2023-10-18

## 2023-10-23 ENCOUNTER — APPOINTMENT (OUTPATIENT)
Dept: UROLOGY | Facility: CLINIC | Age: 58
End: 2023-10-23
Payer: COMMERCIAL

## 2023-10-23 ENCOUNTER — OUTPATIENT (OUTPATIENT)
Dept: OUTPATIENT SERVICES | Facility: HOSPITAL | Age: 58
LOS: 1 days | End: 2023-10-23
Payer: COMMERCIAL

## 2023-10-23 VITALS
SYSTOLIC BLOOD PRESSURE: 181 MMHG | HEART RATE: 66 BPM | OXYGEN SATURATION: 99 % | RESPIRATION RATE: 17 BRPM | DIASTOLIC BLOOD PRESSURE: 95 MMHG

## 2023-10-23 DIAGNOSIS — R35.0 FREQUENCY OF MICTURITION: ICD-10-CM

## 2023-10-23 DIAGNOSIS — R31.0 GROSS HEMATURIA: ICD-10-CM

## 2023-10-23 DIAGNOSIS — Z87.59 PERSONAL HISTORY OF OTHER COMPLICATIONS OF PREGNANCY, CHILDBIRTH AND THE PUERPERIUM: Chronic | ICD-10-CM

## 2023-10-23 DIAGNOSIS — Z98.89 OTHER SPECIFIED POSTPROCEDURAL STATES: Chronic | ICD-10-CM

## 2023-10-23 DIAGNOSIS — Z90.710 ACQUIRED ABSENCE OF BOTH CERVIX AND UTERUS: Chronic | ICD-10-CM

## 2023-10-23 PROCEDURE — 52000 CYSTOURETHROSCOPY: CPT

## 2023-10-26 ENCOUNTER — APPOINTMENT (OUTPATIENT)
Dept: INTERNAL MEDICINE | Facility: CLINIC | Age: 58
End: 2023-10-26
Payer: COMMERCIAL

## 2023-10-26 VITALS — DIASTOLIC BLOOD PRESSURE: 90 MMHG | SYSTOLIC BLOOD PRESSURE: 160 MMHG

## 2023-10-26 VITALS — BODY MASS INDEX: 28.56 KG/M2 | WEIGHT: 182 LBS | HEIGHT: 67 IN

## 2023-10-26 DIAGNOSIS — E66.3 OVERWEIGHT: ICD-10-CM

## 2023-10-26 PROBLEM — R31.0 GROSS HEMATURIA: Status: ACTIVE | Noted: 2023-09-28

## 2023-10-26 PROCEDURE — 99214 OFFICE O/P EST MOD 30 MIN: CPT

## 2023-10-27 DIAGNOSIS — R31.0 GROSS HEMATURIA: ICD-10-CM

## 2023-11-02 RX ORDER — SERTRALINE HYDROCHLORIDE 50 MG/1
50 TABLET, FILM COATED ORAL DAILY
Qty: 90 | Refills: 0 | Status: ACTIVE | COMMUNITY
Start: 1900-01-01 | End: 1900-01-01

## 2023-11-03 LAB
ANION GAP SERPL CALC-SCNC: 12 MMOL/L
BUN SERPL-MCNC: 17 MG/DL
CALCIUM SERPL-MCNC: 9.5 MG/DL
CHLORIDE SERPL-SCNC: 102 MMOL/L
CO2 SERPL-SCNC: 26 MMOL/L
CREAT SERPL-MCNC: 1.2 MG/DL
EGFR: 53 ML/MIN/1.73M2
GLUCOSE SERPL-MCNC: 98 MG/DL
POTASSIUM SERPL-SCNC: 4.4 MMOL/L
SODIUM SERPL-SCNC: 139 MMOL/L

## 2023-11-15 ENCOUNTER — APPOINTMENT (OUTPATIENT)
Dept: CARDIOLOGY | Facility: CLINIC | Age: 58
End: 2023-11-15

## 2023-11-20 ENCOUNTER — APPOINTMENT (OUTPATIENT)
Dept: UROLOGY | Facility: CLINIC | Age: 58
End: 2023-11-20

## 2023-12-15 ENCOUNTER — APPOINTMENT (OUTPATIENT)
Dept: CARDIOLOGY | Facility: CLINIC | Age: 58
End: 2023-12-15
Payer: COMMERCIAL

## 2023-12-15 VITALS
HEART RATE: 68 BPM | OXYGEN SATURATION: 96 % | WEIGHT: 182 LBS | BODY MASS INDEX: 28.51 KG/M2 | DIASTOLIC BLOOD PRESSURE: 94 MMHG | SYSTOLIC BLOOD PRESSURE: 188 MMHG

## 2023-12-15 PROCEDURE — 99214 OFFICE O/P EST MOD 30 MIN: CPT

## 2023-12-15 RX ORDER — METOPROLOL SUCCINATE 50 MG/1
50 TABLET, EXTENDED RELEASE ORAL
Qty: 180 | Refills: 2 | Status: ACTIVE | COMMUNITY
Start: 2023-10-18 | End: 1900-01-01

## 2023-12-15 NOTE — PHYSICAL EXAM
[Well Developed] : well developed [Well Nourished] : well nourished [Normal Conjunctiva] : normal conjunctiva [Normal Venous Pressure] : normal venous pressure [Normal S1, S2] : normal S1, S2 [No Murmur] : no murmur [Clear Lung Fields] : clear lung fields [Soft] : abdomen soft [Normal Gait] : normal gait [No Edema] : no edema [No Rash] : no rash [Moves all extremities] : moves all extremities [Alert and Oriented] : alert and oriented [Normal memory] : normal memory

## 2023-12-16 NOTE — DISCUSSION/SUMMARY
[FreeTextEntry1] : She is a 58-year-old with a history of poorly controlled hypertension, and chest pain. ECG shows normal sinus rhythm. I reviewed her coronary CT angiogram and there is no sign of any atherosclerotic plaque.  This makes ischemic heart disease very unlikely. She was reassured. I have suggested that she undergo echocardiography to see if any LVH is present. Her blood pressure is suboptimal and I have suggested that she continue valsartan 320 mg daily and hydrochlorothiazide 12.5 mg daily. Will plan to increase Toprol-XL to 50 mg BID.   She will follow up in one month for reassessment. Should her blood pressure fail to improve on suggested regimen, may consider a calcium channel blocker.

## 2023-12-16 NOTE — HISTORY OF PRESENT ILLNESS
[FreeTextEntry1] : Tonja Garcia returns today for follow up of her hypertension. Last visit her medical therapy was changed to include metoprolol succinate 50 mg daily and valsartan 160 mg daily in addition to her usual HCTZ 12.5 mg. At subsequent follow up with her PMD, her pressure was still significantly elevated and valsartan was increased to 320 mg daily. As she returns today she is feeling fine and has no complaints.   Prior: Dear Catrachita, Thank you for referring her for evaluation of her HTN and chest pain. She is a 58-year-old with a history of longstanding hypertension who was referred for evaluation of her blood pressure medicines and to change cardiologist.  She had previously been followed by Dr. Brody Mock. He reports that her prior work-up was unremarkable, though she describes her stress test being aborted because of elevated blood pressure readings. She had a coronary CT angiogram done in 2022 that showed a coronary calcium score of 0 and no obstructive or even mildly obstructive coronary arteries. He does describe sudden, transient episodes of chest pains in the mid sternum that come and go and a particularly more noticeable when she lies down at night. She denies any exertional chest pains but does not exercise on a routine basis due to lack of motivation. She has no history of diabetes mellitus, hyperlipidemia, family history of premature coronary artery disease and she quit smoking over 20 years ago. She is concerned about a history of "white matter disease" seen on a brain MRI done for headache Work-up.

## 2023-12-16 NOTE — END OF VISIT
[FreeTextEntry3] : I discussed the patient with Janice Brooks NP and I agree with the clinical findings, exam and assessment and plan as above.

## 2023-12-21 ENCOUNTER — LABORATORY RESULT (OUTPATIENT)
Age: 58
End: 2023-12-21

## 2023-12-21 ENCOUNTER — APPOINTMENT (OUTPATIENT)
Dept: INTERNAL MEDICINE | Facility: CLINIC | Age: 58
End: 2023-12-21
Payer: COMMERCIAL

## 2023-12-21 VITALS — WEIGHT: 180 LBS | BODY MASS INDEX: 28.25 KG/M2 | HEIGHT: 67 IN

## 2023-12-21 VITALS — SYSTOLIC BLOOD PRESSURE: 130 MMHG | DIASTOLIC BLOOD PRESSURE: 80 MMHG

## 2023-12-21 DIAGNOSIS — N13.30 UNSPECIFIED HYDRONEPHROSIS: ICD-10-CM

## 2023-12-21 DIAGNOSIS — G47.33 OBSTRUCTIVE SLEEP APNEA (ADULT) (PEDIATRIC): ICD-10-CM

## 2023-12-21 DIAGNOSIS — R73.03 PREDIABETES.: ICD-10-CM

## 2023-12-21 DIAGNOSIS — Z23 ENCOUNTER FOR IMMUNIZATION: ICD-10-CM

## 2023-12-21 PROCEDURE — 99214 OFFICE O/P EST MOD 30 MIN: CPT | Mod: 25

## 2023-12-21 PROCEDURE — G0008: CPT

## 2023-12-21 PROCEDURE — 36415 COLL VENOUS BLD VENIPUNCTURE: CPT

## 2023-12-21 PROCEDURE — 90686 IIV4 VACC NO PRSV 0.5 ML IM: CPT

## 2023-12-21 NOTE — ASSESSMENT
[FreeTextEntry1] : This is a 58-year-old patient with a history of hypertension, hypercholesterolemia, vertigo who was found recently to be severely hypertensive.  Her valsartan was increased to 320 mg metoprolol to 50 mg twice daily and her hydrochlorothiazide to 25 mg daily.  Her blood pressure today was 130/80 physical examination was positive for mild obesity.  I instructed the patient on a low sodium diet advised her to lose weight and to exercise.  Bloods were drawn to check her BUN/creatinine and electrolytes as these medications can affect kidney function and electrolytes function

## 2023-12-21 NOTE — HEALTH RISK ASSESSMENT
[No] : No [Never (0 pts)] : Never (0 points) [0] : 2) Feeling down, depressed, or hopeless: Not at all (0) [PHQ-2 Negative - No further assessment needed] : PHQ-2 Negative - No further assessment needed [[id number]] : 0 [Never] : Never

## 2023-12-21 NOTE — REVIEW OF SYSTEMS
[Fatigue] : fatigue [Dizziness] : dizziness [Unsteady Walking] : ataxia [Negative] : Gastrointestinal

## 2023-12-21 NOTE — HISTORY OF PRESENT ILLNESS
[de-identified] : This is a 58-year-old patient with a history of hypertension, hypercholesterolemia, vertigo, who is here today for follow-up visit The patient recently saw her cardiologist who found her blood pressure to be 180/90.  He increased her valsartan to 320 mg daily and also increased her metoprolol to 50 mg twice daily

## 2023-12-22 LAB
ALBUMIN SERPL ELPH-MCNC: 4.8 G/DL
ALP BLD-CCNC: 84 U/L
ALT SERPL-CCNC: 15 U/L
ANION GAP SERPL CALC-SCNC: 13 MMOL/L
APPEARANCE: CLEAR
AST SERPL-CCNC: 17 U/L
BACTERIA: NEGATIVE /HPF
BASOPHILS # BLD AUTO: 0.03 K/UL
BASOPHILS NFR BLD AUTO: 0.6 %
BILIRUB SERPL-MCNC: 0.5 MG/DL
BILIRUBIN URINE: NEGATIVE
BLOOD URINE: NEGATIVE
BUN SERPL-MCNC: 22 MG/DL
CALCIUM SERPL-MCNC: 9.6 MG/DL
CAST: 4 /LPF
CHLORIDE SERPL-SCNC: 102 MMOL/L
CHOLEST SERPL-MCNC: 175 MG/DL
CO2 SERPL-SCNC: 27 MMOL/L
COLOR: YELLOW
CREAT SERPL-MCNC: 1.07 MG/DL
EGFR: 61 ML/MIN/1.73M2
EOSINOPHIL # BLD AUTO: 0.56 K/UL
EOSINOPHIL NFR BLD AUTO: 11.2 %
EPITHELIAL CELLS: 1 /HPF
ESTIMATED AVERAGE GLUCOSE: 111 MG/DL
FERRITIN SERPL-MCNC: 55 NG/ML
FOLATE SERPL-MCNC: 7.2 NG/ML
GLUCOSE QUALITATIVE U: NEGATIVE MG/DL
GLUCOSE SERPL-MCNC: 106 MG/DL
HBA1C MFR BLD HPLC: 5.5 %
HCT VFR BLD CALC: 40.4 %
HDLC SERPL-MCNC: 52 MG/DL
HGB BLD-MCNC: 13.1 G/DL
IMM GRANULOCYTES NFR BLD AUTO: 0 %
KETONES URINE: NEGATIVE MG/DL
LDLC SERPL CALC-MCNC: 105 MG/DL
LEUKOCYTE ESTERASE URINE: NEGATIVE
LYMPHOCYTES # BLD AUTO: 1.93 K/UL
LYMPHOCYTES NFR BLD AUTO: 38.8 %
MAN DIFF?: NORMAL
MCHC RBC-ENTMCNC: 32.2 PG
MCHC RBC-ENTMCNC: 32.4 GM/DL
MCV RBC AUTO: 99.3 FL
MICROSCOPIC-UA: NORMAL
MONOCYTES # BLD AUTO: 0.44 K/UL
MONOCYTES NFR BLD AUTO: 8.8 %
NEUTROPHILS # BLD AUTO: 2.02 K/UL
NEUTROPHILS NFR BLD AUTO: 40.6 %
NITRITE URINE: NEGATIVE
NONHDLC SERPL-MCNC: 123 MG/DL
PH URINE: 6
PLATELET # BLD AUTO: 225 K/UL
POTASSIUM SERPL-SCNC: 4.1 MMOL/L
PROT SERPL-MCNC: 7.7 G/DL
PROTEIN URINE: NORMAL MG/DL
RBC # BLD: 4.07 M/UL
RBC # FLD: 14.1 %
RED BLOOD CELLS URINE: 1 /HPF
SODIUM SERPL-SCNC: 141 MMOL/L
SPECIFIC GRAVITY URINE: 1.03
T3RU NFR SERPL: 0.9 TBI
T4 SERPL-MCNC: 6 UG/DL
TRIGL SERPL-MCNC: 99 MG/DL
TSH SERPL-ACNC: 1.44 UIU/ML
URATE SERPL-MCNC: 5.4 MG/DL
UROBILINOGEN URINE: 1 MG/DL
VIT B12 SERPL-MCNC: 622 PG/ML
WBC # FLD AUTO: 4.98 K/UL
WHITE BLOOD CELLS URINE: 0 /HPF

## 2023-12-23 ENCOUNTER — RX RENEWAL (OUTPATIENT)
Age: 58
End: 2023-12-23

## 2023-12-23 NOTE — ASSESSMENT
[FreeTextEntry1] : Patient is s/o COVID infection and pneumonia, now with + antibodies. Has lost weight due to disease and decreased appetite. Will monitor. Has some residual nausea. Will prescribe Omeprazole 20mg OD.\par C/O constipation, abdominal bloating. Will give Senokot S OD, Bene fiber one TBS with bkfst and dinner, and Miralax 17 gm OD. \par Will call if symptoms persist.\par \par Time spent in evaluation, diagnosis, treatment, and counseling 45 min. no

## 2024-01-08 ENCOUNTER — APPOINTMENT (OUTPATIENT)
Dept: CARDIOLOGY | Facility: CLINIC | Age: 59
End: 2024-01-08
Payer: COMMERCIAL

## 2024-01-08 ENCOUNTER — NON-APPOINTMENT (OUTPATIENT)
Age: 59
End: 2024-01-08

## 2024-01-08 VITALS
HEIGHT: 67 IN | OXYGEN SATURATION: 100 % | HEART RATE: 58 BPM | BODY MASS INDEX: 28.56 KG/M2 | SYSTOLIC BLOOD PRESSURE: 158 MMHG | DIASTOLIC BLOOD PRESSURE: 98 MMHG | WEIGHT: 182 LBS

## 2024-01-08 PROCEDURE — 93306 TTE W/DOPPLER COMPLETE: CPT

## 2024-01-08 PROCEDURE — 93000 ELECTROCARDIOGRAM COMPLETE: CPT

## 2024-01-08 PROCEDURE — 99214 OFFICE O/P EST MOD 30 MIN: CPT

## 2024-01-08 RX ORDER — VALSARTAN 160 MG/1
160 TABLET, COATED ORAL DAILY
Qty: 90 | Refills: 3 | Status: DISCONTINUED | COMMUNITY
Start: 2023-12-23 | End: 2024-01-08

## 2024-01-08 RX ORDER — VALSARTAN 320 MG/1
320 TABLET, COATED ORAL
Qty: 90 | Refills: 3 | Status: DISCONTINUED | COMMUNITY
Start: 2023-10-18 | End: 2024-01-08

## 2024-01-12 NOTE — HISTORY OF PRESENT ILLNESS
[FreeTextEntry1] : Tonja returns today for follow up of her hypertension and for an echocardiogram. She has now returned back to work following her medical leave of absence, however, she this will be her last week working as she has accepted an offer for early snf.  Today she reports feeling well and has no specific complaints. She remains concerned that her blood pressure is still elevated. She has been taking her medications as directed.   Prior: Tonja Garcia returns today for follow up of her hypertension. Last visit her medical therapy was changed to include metoprolol succinate 50 mg daily and valsartan 160 mg daily in addition to her usual HCTZ 12.5 mg. At subsequent follow up with her PMD, her pressure was still significantly elevated and valsartan was increased to 320 mg daily. As she returns today she is feeling fine and has no complaints.   Prior: Dear Catrachita, Thank you for referring her for evaluation of her HTN and chest pain. She is a 58-year-old with a history of longstanding hypertension who was referred for evaluation of her blood pressure medicines and to change cardiologist.  She had previously been followed by Dr. Brody Mock. He reports that her prior work-up was unremarkable, though she describes her stress test being aborted because of elevated blood pressure readings. She had a coronary CT angiogram done in 2022 that showed a coronary calcium score of 0 and no obstructive or even mildly obstructive coronary arteries. He does describe sudden, transient episodes of chest pains in the mid sternum that come and go and a particularly more noticeable when she lies down at night. She denies any exertional chest pains but does not exercise on a routine basis due to lack of motivation. She has no history of diabetes mellitus, hyperlipidemia, family history of premature coronary artery disease and she quit smoking over 20 years ago. She is concerned about a history of "white matter disease" seen on a brain MRI done for headache Work-up.

## 2024-01-12 NOTE — END OF VISIT
[FreeTextEntry3] : I discussed the patient with Janice Brooks NP and I agree with the clinical findings, exam and assessment and plan as above. [Time Spent: ___ minutes] : I have spent [unfilled] minutes of time on the encounter.

## 2024-01-12 NOTE — DISCUSSION/SUMMARY
[EKG obtained to assist in diagnosis and management of assessed problem(s)] : EKG obtained to assist in diagnosis and management of assessed problem(s) [FreeTextEntry1] : She is a 58-year-old with a history of poorly controlled hypertension, and chest pain. ECG shows normal sinus rhythm. I reviewed her coronary CT angiogram and there is no sign of any atherosclerotic plaque.  This makes ischemic heart disease very unlikely. She was reassured. Echocardiogram from today reviewed, evidence of mild left ventricular hypertrophy is present.  Her blood pressure is suboptimal and I have suggested that she discontinue valsartan 320 mg daily in exchange for nifedipine 60 mg daily which she had taken previously with desired effect.  Continue hydrochlorothiazide 12.5 mg daily. Continue Toprol-XL to 50 mg BID.   She will follow up in one month for reassessment.

## 2024-01-29 ENCOUNTER — APPOINTMENT (OUTPATIENT)
Dept: CARDIOLOGY | Facility: CLINIC | Age: 59
End: 2024-01-29

## 2024-02-12 ENCOUNTER — RX RENEWAL (OUTPATIENT)
Age: 59
End: 2024-02-12

## 2024-03-13 ENCOUNTER — RX RENEWAL (OUTPATIENT)
Age: 59
End: 2024-03-13

## 2024-04-09 ENCOUNTER — APPOINTMENT (OUTPATIENT)
Dept: INTERNAL MEDICINE | Facility: CLINIC | Age: 59
End: 2024-04-09

## 2024-05-06 NOTE — ASU PREOP CHECKLIST - PATIENT'S PERSONAL PROPERTY GIVEN TO
Patient blood pressure is doing well.   Continue medications  -losartan/hctz 100/25  -metoprolol 25mg    ASU 22/on unit

## 2024-05-07 ENCOUNTER — NON-APPOINTMENT (OUTPATIENT)
Age: 59
End: 2024-05-07

## 2024-05-07 ENCOUNTER — APPOINTMENT (OUTPATIENT)
Dept: CARDIOLOGY | Facility: CLINIC | Age: 59
End: 2024-05-07
Payer: COMMERCIAL

## 2024-05-07 VITALS
HEIGHT: 67 IN | WEIGHT: 191 LBS | BODY MASS INDEX: 29.98 KG/M2 | OXYGEN SATURATION: 97 % | HEART RATE: 68 BPM | SYSTOLIC BLOOD PRESSURE: 132 MMHG | DIASTOLIC BLOOD PRESSURE: 76 MMHG

## 2024-05-07 DIAGNOSIS — I10 ESSENTIAL (PRIMARY) HYPERTENSION: ICD-10-CM

## 2024-05-07 PROCEDURE — 99213 OFFICE O/P EST LOW 20 MIN: CPT

## 2024-05-07 PROCEDURE — 93000 ELECTROCARDIOGRAM COMPLETE: CPT

## 2024-05-19 ENCOUNTER — RX RENEWAL (OUTPATIENT)
Age: 59
End: 2024-05-19

## 2024-05-19 RX ORDER — HYDROCHLOROTHIAZIDE 12.5 MG/1
12.5 TABLET ORAL
Qty: 30 | Refills: 0 | Status: ACTIVE | COMMUNITY
Start: 2020-03-29 | End: 1900-01-01

## 2024-05-19 RX ORDER — NIFEDIPINE 60 MG/1
60 TABLET, EXTENDED RELEASE ORAL DAILY
Qty: 30 | Refills: 0 | Status: ACTIVE | COMMUNITY
Start: 2024-01-08 | End: 1900-01-01

## 2024-05-26 NOTE — DISCUSSION/SUMMARY
[EKG obtained to assist in diagnosis and management of assessed problem(s)] : EKG obtained to assist in diagnosis and management of assessed problem(s) [FreeTextEntry1] : She is a 58-year-old with a history of poorly controlled hypertension, and chest pain.  ECG shows normal sinus rhythm. I reviewed her coronary CT angiogram and there is no sign of any atherosclerotic plaque.  This makes ischemic heart disease very unlikely. She was reassured. Echocardiogram from today reviewed, evidence of mild left ventricular hypertrophy is present.  Her blood pressure is much improved with nifedipine 60 mg daily. Continue hydrochlorothiazide 12.5 mg daily. Continue Toprol-XL to 50 mg BID.   Follow up in 6 months.

## 2024-05-26 NOTE — END OF VISIT
[Time Spent: ___ minutes] : I have spent [unfilled] minutes of time on the encounter. [FreeTextEntry3] : I discussed the patient with Janice Brooks NP and I agree with the clinical findings, exam and assessment and plan as above.

## 2024-05-26 NOTE — HISTORY OF PRESENT ILLNESS
[FreeTextEntry1] : She is now officially retired. She has been enjoying travel to [place] and plans to go to [place] this summer.  She is adjusting to retired life. No dedicated exercise. No chest pain or shortness of breath.    Prior: Tonja returns today for follow up of her hypertension and for an echocardiogram. She has now returned back to work following her medical leave of absence, however, she this will be her last week working as she has accepted an offer for early jail.  Today she reports feeling well and has no specific complaints. She remains concerned that her blood pressure is still elevated. She has been taking her medications as directed.   Prior: Tonja Garcia returns today for follow up of her hypertension. Last visit her medical therapy was changed to include metoprolol succinate 50 mg daily and valsartan 160 mg daily in addition to her usual HCTZ 12.5 mg. At subsequent follow up with her PMD, her pressure was still significantly elevated and valsartan was increased to 320 mg daily. As she returns today she is feeling fine and has no complaints.   Prior: Dear Catrachita, Thank you for referring her for evaluation of her HTN and chest pain. She is a 58-year-old with a history of longstanding hypertension who was referred for evaluation of her blood pressure medicines and to change cardiologist.  She had previously been followed by Dr. Brody Mock. He reports that her prior work-up was unremarkable, though she describes her stress test being aborted because of elevated blood pressure readings. She had a coronary CT angiogram done in 2022 that showed a coronary calcium score of 0 and no obstructive or even mildly obstructive coronary arteries. He does describe sudden, transient episodes of chest pains in the mid sternum that come and go and a particularly more noticeable when she lies down at night. She denies any exertional chest pains but does not exercise on a routine basis due to lack of motivation. She has no history of diabetes mellitus, hyperlipidemia, family history of premature coronary artery disease and she quit smoking over 20 years ago. She is concerned about a history of "white matter disease" seen on a brain MRI done for headache Work-up.

## 2024-07-18 ENCOUNTER — RX RENEWAL (OUTPATIENT)
Age: 59
End: 2024-07-18

## 2024-10-25 ENCOUNTER — APPOINTMENT (OUTPATIENT)
Dept: UROLOGY | Facility: CLINIC | Age: 59
End: 2024-10-25

## 2024-10-28 ENCOUNTER — RX RENEWAL (OUTPATIENT)
Age: 59
End: 2024-10-28

## 2025-01-20 NOTE — ASSESSMENT
[FreeTextEntry1] : Perennial allergic rhinitis with seasonal exacerbation:\par \par I will discuss with her rheumatologist about starting allergy immunotherapy.   It is unclear if she has a specific rheumatologic diagnosis.   
Normal

## 2025-01-21 ENCOUNTER — RX RENEWAL (OUTPATIENT)
Age: 60
End: 2025-01-21

## 2025-02-05 ENCOUNTER — APPOINTMENT (OUTPATIENT)
Dept: MAMMOGRAPHY | Facility: CLINIC | Age: 60
End: 2025-02-05

## 2025-02-05 ENCOUNTER — APPOINTMENT (OUTPATIENT)
Dept: ULTRASOUND IMAGING | Facility: CLINIC | Age: 60
End: 2025-02-05

## 2025-02-18 ENCOUNTER — APPOINTMENT (OUTPATIENT)
Dept: INTERNAL MEDICINE | Facility: CLINIC | Age: 60
End: 2025-02-18
Payer: COMMERCIAL

## 2025-02-18 ENCOUNTER — LABORATORY RESULT (OUTPATIENT)
Age: 60
End: 2025-02-18

## 2025-02-18 VITALS — DIASTOLIC BLOOD PRESSURE: 70 MMHG | SYSTOLIC BLOOD PRESSURE: 130 MMHG

## 2025-02-18 VITALS — BODY MASS INDEX: 30.61 KG/M2 | WEIGHT: 195 LBS | HEIGHT: 67 IN

## 2025-02-18 DIAGNOSIS — G47.33 OBSTRUCTIVE SLEEP APNEA (ADULT) (PEDIATRIC): ICD-10-CM

## 2025-02-18 DIAGNOSIS — Z86.0100 PERSONAL HISTORY OF COLON POLYPS, UNSPECIFIED: ICD-10-CM

## 2025-02-18 DIAGNOSIS — R73.03 PREDIABETES.: ICD-10-CM

## 2025-02-18 DIAGNOSIS — I10 ESSENTIAL (PRIMARY) HYPERTENSION: ICD-10-CM

## 2025-02-18 DIAGNOSIS — N13.30 UNSPECIFIED HYDRONEPHROSIS: ICD-10-CM

## 2025-02-18 LAB
ALBUMIN SERPL ELPH-MCNC: 4.6 G/DL
ALP BLD-CCNC: 86 U/L
ALT SERPL-CCNC: 21 U/L
ANION GAP SERPL CALC-SCNC: 12 MMOL/L
APPEARANCE: CLEAR
AST SERPL-CCNC: 26 U/L
BACTERIA: NEGATIVE /HPF
BASOPHILS # BLD AUTO: 0.04 K/UL
BASOPHILS NFR BLD AUTO: 0.7 %
BILIRUB SERPL-MCNC: 0.4 MG/DL
BILIRUBIN URINE: NEGATIVE
BLOOD URINE: NEGATIVE
BUN SERPL-MCNC: 16 MG/DL
CALCIUM SERPL-MCNC: 9.9 MG/DL
CAST: 0 /LPF
CHLORIDE SERPL-SCNC: 104 MMOL/L
CHOLEST SERPL-MCNC: 177 MG/DL
CO2 SERPL-SCNC: 28 MMOL/L
COLOR: YELLOW
CREAT SERPL-MCNC: 1.09 MG/DL
EGFR: 59 ML/MIN/1.73M2
EOSINOPHIL # BLD AUTO: 0.82 K/UL
EOSINOPHIL NFR BLD AUTO: 13.6 %
EPITHELIAL CELLS: 1 /HPF
ESTIMATED AVERAGE GLUCOSE: 120 MG/DL
GLUCOSE QUALITATIVE U: NEGATIVE MG/DL
GLUCOSE SERPL-MCNC: 124 MG/DL
HBA1C MFR BLD HPLC: 5.8 %
HCT VFR BLD CALC: 38.7 %
HDLC SERPL-MCNC: 46 MG/DL
HGB BLD-MCNC: 12.4 G/DL
IMM GRANULOCYTES NFR BLD AUTO: 0.3 %
KETONES URINE: NEGATIVE MG/DL
LDLC SERPL CALC-MCNC: 107 MG/DL
LEUKOCYTE ESTERASE URINE: NEGATIVE
LYMPHOCYTES # BLD AUTO: 2.15 K/UL
LYMPHOCYTES NFR BLD AUTO: 35.7 %
MAN DIFF?: NORMAL
MCHC RBC-ENTMCNC: 32 G/DL
MCHC RBC-ENTMCNC: 32.5 PG
MCV RBC AUTO: 101.3 FL
MICROSCOPIC-UA: NORMAL
MONOCYTES # BLD AUTO: 0.34 K/UL
MONOCYTES NFR BLD AUTO: 5.6 %
NEUTROPHILS # BLD AUTO: 2.66 K/UL
NEUTROPHILS NFR BLD AUTO: 44.1 %
NITRITE URINE: NEGATIVE
NONHDLC SERPL-MCNC: 131 MG/DL
PH URINE: 7.5
PLATELET # BLD AUTO: 224 K/UL
POTASSIUM SERPL-SCNC: 4.4 MMOL/L
PROT SERPL-MCNC: 7.7 G/DL
PROTEIN URINE: NEGATIVE MG/DL
RBC # BLD: 3.82 M/UL
RBC # FLD: 14.6 %
RED BLOOD CELLS URINE: 0 /HPF
SODIUM SERPL-SCNC: 144 MMOL/L
SPECIFIC GRAVITY URINE: 1.01
T3RU NFR SERPL: 0.9 TBI
T4 SERPL-MCNC: 6.9 UG/DL
TRIGL SERPL-MCNC: 133 MG/DL
TSH SERPL-ACNC: 1.37 UIU/ML
URATE SERPL-MCNC: 5.5 MG/DL
UROBILINOGEN URINE: 0.2 MG/DL
WBC # FLD AUTO: 6.03 K/UL
WHITE BLOOD CELLS URINE: 1 /HPF

## 2025-02-18 PROCEDURE — G2211 COMPLEX E/M VISIT ADD ON: CPT

## 2025-02-18 PROCEDURE — 99214 OFFICE O/P EST MOD 30 MIN: CPT

## 2025-02-18 PROCEDURE — 36415 COLL VENOUS BLD VENIPUNCTURE: CPT

## 2025-03-03 ENCOUNTER — RESULT REVIEW (OUTPATIENT)
Age: 60
End: 2025-03-03

## 2025-03-03 ENCOUNTER — APPOINTMENT (OUTPATIENT)
Dept: ULTRASOUND IMAGING | Facility: CLINIC | Age: 60
End: 2025-03-03
Payer: COMMERCIAL

## 2025-03-03 ENCOUNTER — OUTPATIENT (OUTPATIENT)
Dept: OUTPATIENT SERVICES | Facility: HOSPITAL | Age: 60
LOS: 1 days | End: 2025-03-03
Payer: COMMERCIAL

## 2025-03-03 ENCOUNTER — APPOINTMENT (OUTPATIENT)
Dept: MAMMOGRAPHY | Facility: CLINIC | Age: 60
End: 2025-03-03
Payer: COMMERCIAL

## 2025-03-03 DIAGNOSIS — Z87.59 PERSONAL HISTORY OF OTHER COMPLICATIONS OF PREGNANCY, CHILDBIRTH AND THE PUERPERIUM: Chronic | ICD-10-CM

## 2025-03-03 DIAGNOSIS — Z98.89 OTHER SPECIFIED POSTPROCEDURAL STATES: Chronic | ICD-10-CM

## 2025-03-03 DIAGNOSIS — Z90.710 ACQUIRED ABSENCE OF BOTH CERVIX AND UTERUS: Chronic | ICD-10-CM

## 2025-03-03 DIAGNOSIS — Z00.00 ENCOUNTER FOR GENERAL ADULT MEDICAL EXAMINATION WITHOUT ABNORMAL FINDINGS: ICD-10-CM

## 2025-03-03 PROCEDURE — 77066 DX MAMMO INCL CAD BI: CPT

## 2025-03-03 PROCEDURE — 76641 ULTRASOUND BREAST COMPLETE: CPT | Mod: 26,50

## 2025-03-03 PROCEDURE — 76641 ULTRASOUND BREAST COMPLETE: CPT

## 2025-03-03 PROCEDURE — 77066 DX MAMMO INCL CAD BI: CPT | Mod: 26

## 2025-03-03 PROCEDURE — G0279: CPT

## 2025-03-03 PROCEDURE — G0279: CPT | Mod: 26

## 2025-03-25 ENCOUNTER — APPOINTMENT (OUTPATIENT)
Dept: MRI IMAGING | Facility: IMAGING CENTER | Age: 60
End: 2025-03-25
Payer: COMMERCIAL

## 2025-03-25 ENCOUNTER — OUTPATIENT (OUTPATIENT)
Dept: OUTPATIENT SERVICES | Facility: HOSPITAL | Age: 60
LOS: 1 days | End: 2025-03-25
Payer: COMMERCIAL

## 2025-03-25 ENCOUNTER — RESULT REVIEW (OUTPATIENT)
Age: 60
End: 2025-03-25

## 2025-03-25 DIAGNOSIS — Z87.59 PERSONAL HISTORY OF OTHER COMPLICATIONS OF PREGNANCY, CHILDBIRTH AND THE PUERPERIUM: Chronic | ICD-10-CM

## 2025-03-25 DIAGNOSIS — Z98.89 OTHER SPECIFIED POSTPROCEDURAL STATES: Chronic | ICD-10-CM

## 2025-03-25 DIAGNOSIS — Z90.710 ACQUIRED ABSENCE OF BOTH CERVIX AND UTERUS: Chronic | ICD-10-CM

## 2025-03-25 DIAGNOSIS — Z00.8 ENCOUNTER FOR OTHER GENERAL EXAMINATION: ICD-10-CM

## 2025-03-25 PROCEDURE — 72148 MRI LUMBAR SPINE W/O DYE: CPT | Mod: 26

## 2025-03-25 PROCEDURE — 72148 MRI LUMBAR SPINE W/O DYE: CPT

## 2025-04-01 DIAGNOSIS — K86.2 CYST OF PANCREAS: ICD-10-CM

## 2025-04-07 ENCOUNTER — APPOINTMENT (OUTPATIENT)
Dept: MRI IMAGING | Facility: CLINIC | Age: 60
End: 2025-04-07
Payer: COMMERCIAL

## 2025-04-07 PROCEDURE — A9585: CPT

## 2025-04-07 PROCEDURE — 74183 MRI ABD W/O CNTR FLWD CNTR: CPT

## 2025-04-11 DIAGNOSIS — K82.4 CHOLESTEROLOSIS OF GALLBLADDER: ICD-10-CM

## 2025-04-11 LAB
AMYLASE/CREAT SERPL: 131 U/L
BASOPHILS # BLD AUTO: 0.02 K/UL
BASOPHILS NFR BLD AUTO: 0.3 %
EOSINOPHIL # BLD AUTO: 0.5 K/UL
EOSINOPHIL NFR BLD AUTO: 8.4 %
HCT VFR BLD CALC: 41.4 %
HGB BLD-MCNC: 13.8 G/DL
IMM GRANULOCYTES NFR BLD AUTO: 0.3 %
LPL SERPL-CCNC: 38 U/L
LYMPHOCYTES # BLD AUTO: 2.48 K/UL
LYMPHOCYTES NFR BLD AUTO: 41.8 %
MAN DIFF?: NORMAL
MCHC RBC-ENTMCNC: 32.3 PG
MCHC RBC-ENTMCNC: 33.3 G/DL
MCV RBC AUTO: 97 FL
MONOCYTES # BLD AUTO: 0.46 K/UL
MONOCYTES NFR BLD AUTO: 7.8 %
NEUTROPHILS # BLD AUTO: 2.45 K/UL
NEUTROPHILS NFR BLD AUTO: 41.4 %
PLATELET # BLD AUTO: 260 K/UL
RBC # BLD: 4.27 M/UL
RBC # FLD: 13.2 %
WBC # FLD AUTO: 5.93 K/UL

## 2025-04-17 ENCOUNTER — LABORATORY RESULT (OUTPATIENT)
Age: 60
End: 2025-04-17

## 2025-04-17 ENCOUNTER — RX RENEWAL (OUTPATIENT)
Age: 60
End: 2025-04-17

## 2025-04-17 ENCOUNTER — APPOINTMENT (OUTPATIENT)
Dept: INTERNAL MEDICINE | Facility: CLINIC | Age: 60
End: 2025-04-17
Payer: COMMERCIAL

## 2025-04-17 ENCOUNTER — NON-APPOINTMENT (OUTPATIENT)
Age: 60
End: 2025-04-17

## 2025-04-17 VITALS — SYSTOLIC BLOOD PRESSURE: 130 MMHG | DIASTOLIC BLOOD PRESSURE: 70 MMHG

## 2025-04-17 VITALS — HEIGHT: 67 IN | WEIGHT: 188 LBS | BODY MASS INDEX: 29.51 KG/M2

## 2025-04-17 DIAGNOSIS — G47.33 OBSTRUCTIVE SLEEP APNEA (ADULT) (PEDIATRIC): ICD-10-CM

## 2025-04-17 DIAGNOSIS — I10 ESSENTIAL (PRIMARY) HYPERTENSION: ICD-10-CM

## 2025-04-17 DIAGNOSIS — Z00.00 ENCOUNTER FOR GENERAL ADULT MEDICAL EXAMINATION W/OUT ABNORMAL FINDINGS: ICD-10-CM

## 2025-04-17 DIAGNOSIS — E66.9 OBESITY, UNSPECIFIED: ICD-10-CM

## 2025-04-17 DIAGNOSIS — K86.2 CYST OF PANCREAS: ICD-10-CM

## 2025-04-17 DIAGNOSIS — D25.9 LEIOMYOMA OF UTERUS, UNSPECIFIED: ICD-10-CM

## 2025-04-17 PROCEDURE — 99396 PREV VISIT EST AGE 40-64: CPT

## 2025-04-17 PROCEDURE — 93000 ELECTROCARDIOGRAM COMPLETE: CPT

## 2025-04-17 RX ORDER — TIRZEPATIDE 2.5 MG/.5ML
2.5 INJECTION, SOLUTION SUBCUTANEOUS WEEKLY
Qty: 2 | Refills: 0 | Status: ACTIVE | COMMUNITY
Start: 2025-04-17 | End: 1900-01-01

## 2025-04-21 ENCOUNTER — OUTPATIENT (OUTPATIENT)
Dept: OUTPATIENT SERVICES | Facility: HOSPITAL | Age: 60
LOS: 1 days | End: 2025-04-21

## 2025-04-21 ENCOUNTER — TRANSCRIPTION ENCOUNTER (OUTPATIENT)
Age: 60
End: 2025-04-21

## 2025-04-21 ENCOUNTER — APPOINTMENT (OUTPATIENT)
Dept: INTERNAL MEDICINE | Facility: CLINIC | Age: 60
End: 2025-04-21

## 2025-04-21 ENCOUNTER — NON-APPOINTMENT (OUTPATIENT)
Age: 60
End: 2025-04-21

## 2025-04-21 DIAGNOSIS — Z98.89 OTHER SPECIFIED POSTPROCEDURAL STATES: Chronic | ICD-10-CM

## 2025-04-21 DIAGNOSIS — Z90.710 ACQUIRED ABSENCE OF BOTH CERVIX AND UTERUS: Chronic | ICD-10-CM

## 2025-04-21 LAB
ALBUMIN SERPL ELPH-MCNC: 4.7 G/DL
ALP BLD-CCNC: 83 U/L
ALT SERPL-CCNC: 14 U/L
ANION GAP SERPL CALC-SCNC: 11 MMOL/L
APPEARANCE: CLEAR
AST SERPL-CCNC: 19 U/L
BACTERIA: NEGATIVE /HPF
BASOPHILS # BLD AUTO: 0.02 K/UL
BASOPHILS NFR BLD AUTO: 0.4 %
BILIRUB SERPL-MCNC: 0.4 MG/DL
BILIRUBIN URINE: NEGATIVE
BLOOD URINE: NEGATIVE
BUN SERPL-MCNC: 32 MG/DL
CALCIUM SERPL-MCNC: 9.8 MG/DL
CAST: 12 /LPF
CHLORIDE SERPL-SCNC: 103 MMOL/L
CHOLEST SERPL-MCNC: 186 MG/DL
CO2 SERPL-SCNC: 26 MMOL/L
COLOR: NORMAL
CREAT SERPL-MCNC: 1.28 MG/DL
EGFRCR SERPLBLD CKD-EPI 2021: 49 ML/MIN/1.73M2
EOSINOPHIL # BLD AUTO: 0.52 K/UL
EOSINOPHIL NFR BLD AUTO: 9.3 %
EPITHELIAL CELLS: 2 /HPF
ESTIMATED AVERAGE GLUCOSE: 134 MG/DL
FERRITIN SERPL-MCNC: 112 NG/ML
FOLATE SERPL-MCNC: 10.5 NG/ML
GLUCOSE QUALITATIVE U: NEGATIVE MG/DL
GLUCOSE SERPL-MCNC: 100 MG/DL
HBA1C MFR BLD HPLC: 6.3 %
HCT VFR BLD CALC: 37.6 %
HDLC SERPL-MCNC: 40 MG/DL
HGB BLD-MCNC: 12.4 G/DL
HYALINE CASTS: PRESENT
IMM GRANULOCYTES NFR BLD AUTO: 0.2 %
KETONES URINE: ABNORMAL MG/DL
LDLC SERPL-MCNC: 133 MG/DL
LEUKOCYTE ESTERASE URINE: ABNORMAL
LYMPHOCYTES # BLD AUTO: 2.26 K/UL
LYMPHOCYTES NFR BLD AUTO: 40.4 %
MAN DIFF?: NORMAL
MCHC RBC-ENTMCNC: 32 PG
MCHC RBC-ENTMCNC: 33 G/DL
MCV RBC AUTO: 96.9 FL
MICROSCOPIC-UA: NORMAL
MONOCYTES # BLD AUTO: 0.43 K/UL
MONOCYTES NFR BLD AUTO: 7.7 %
NEUTROPHILS # BLD AUTO: 2.35 K/UL
NEUTROPHILS NFR BLD AUTO: 42 %
NITRITE URINE: NEGATIVE
NONHDLC SERPL-MCNC: 146 MG/DL
PH URINE: 5.5
PLATELET # BLD AUTO: 189 K/UL
POTASSIUM SERPL-SCNC: 4.5 MMOL/L
PROT SERPL-MCNC: 7.9 G/DL
PROTEIN URINE: NORMAL MG/DL
RBC # BLD: 3.88 M/UL
RBC # FLD: 13.5 %
RED BLOOD CELLS URINE: 3 /HPF
REVIEW: NORMAL
SODIUM SERPL-SCNC: 140 MMOL/L
SPECIFIC GRAVITY URINE: >1.03
T3RU NFR SERPL: 0.9 TBI
T4 SERPL-MCNC: 7 UG/DL
TRIGL SERPL-MCNC: 70 MG/DL
TSH SERPL-ACNC: 1.19 UIU/ML
URATE SERPL-MCNC: 6.2 MG/DL
UROBILINOGEN URINE: 1 MG/DL
VIT B12 SERPL-MCNC: 953 PG/ML
WBC # FLD AUTO: 5.59 K/UL
WHITE BLOOD CELLS URINE: 2 /HPF

## 2025-04-23 ENCOUNTER — TRANSCRIPTION ENCOUNTER (OUTPATIENT)
Age: 60
End: 2025-04-23

## 2025-04-24 ENCOUNTER — NON-APPOINTMENT (OUTPATIENT)
Age: 60
End: 2025-04-24

## 2025-04-25 ENCOUNTER — NON-APPOINTMENT (OUTPATIENT)
Age: 60
End: 2025-04-25

## 2025-04-25 ENCOUNTER — APPOINTMENT (OUTPATIENT)
Dept: OBGYN | Facility: CLINIC | Age: 60
End: 2025-04-25
Payer: COMMERCIAL

## 2025-04-25 VITALS
SYSTOLIC BLOOD PRESSURE: 150 MMHG | HEIGHT: 67 IN | HEART RATE: 78 BPM | DIASTOLIC BLOOD PRESSURE: 75 MMHG | WEIGHT: 190 LBS | BODY MASS INDEX: 29.82 KG/M2

## 2025-04-25 DIAGNOSIS — R10.13 EPIGASTRIC PAIN: ICD-10-CM

## 2025-04-25 DIAGNOSIS — Z01.419 ENCOUNTER FOR GYNECOLOGICAL EXAMINATION (GENERAL) (ROUTINE) W/OUT ABNORMAL FINDINGS: ICD-10-CM

## 2025-04-25 DIAGNOSIS — U07.1 COVID-19: ICD-10-CM

## 2025-04-25 DIAGNOSIS — Z82.49 FAMILY HISTORY OF ISCHEMIC HEART DISEASE AND OTHER DISEASES OF THE CIRCULATORY SYSTEM: ICD-10-CM

## 2025-04-25 DIAGNOSIS — Z87.898 PERSONAL HISTORY OF OTHER SPECIFIED CONDITIONS: ICD-10-CM

## 2025-04-25 DIAGNOSIS — Z87.19 PERSONAL HISTORY OF OTHER DISEASES OF THE DIGESTIVE SYSTEM: ICD-10-CM

## 2025-04-25 DIAGNOSIS — J12.82 COVID-19: ICD-10-CM

## 2025-04-25 DIAGNOSIS — Z84.1 FAMILY HISTORY OF DISORDERS OF KIDNEY AND URETER: ICD-10-CM

## 2025-04-25 PROCEDURE — 99396 PREV VISIT EST AGE 40-64: CPT

## 2025-05-06 ENCOUNTER — APPOINTMENT (OUTPATIENT)
Dept: OTOLARYNGOLOGY | Facility: CLINIC | Age: 60
End: 2025-05-06

## 2025-06-11 NOTE — ED ADULT NURSE NOTE - NS ED NURSE DISCH DISPOSITION
Admission process complete. Patient ready for procedure. Plan of care reviewed with patient. Preoperative fasting appropriate for procedure and sedation. Call light in reach and bed in lowest position.      Discharged

## 2025-07-16 ENCOUNTER — RX RENEWAL (OUTPATIENT)
Age: 60
End: 2025-07-16

## (undated) DEVICE — SUT VICRYL 3-0 27" SH UNDYED

## (undated) DEVICE — WARMING BLANKET FULL UNDERBODY

## (undated) DEVICE — POSITIONER STRAP ARMBOARD VELCRO TS-30

## (undated) DEVICE — PACK BREAST MINOR

## (undated) DEVICE — ELCTR BOVIE PENCIL SMOKE EVACUATION

## (undated) DEVICE — PROBE LOCALIZER W/ DRAPES

## (undated) DEVICE — ELCTR GROUNDING PAD ADULT COVIDIEN

## (undated) DEVICE — DRSG COMBINE 5X9"

## (undated) DEVICE — SOL IRR POUR H2O 500ML

## (undated) DEVICE — WARMING BLANKET LOWER ADULT

## (undated) DEVICE — SUT MONOCRYL 4-0 27" PS-2 UNDYED

## (undated) DEVICE — SHEATH SURG GUIDE SCOUT DISP STRL

## (undated) DEVICE — DRSG TAPE MICROPORE 1"